# Patient Record
Sex: MALE | Race: WHITE | NOT HISPANIC OR LATINO | Employment: OTHER | URBAN - METROPOLITAN AREA
[De-identification: names, ages, dates, MRNs, and addresses within clinical notes are randomized per-mention and may not be internally consistent; named-entity substitution may affect disease eponyms.]

---

## 2017-02-14 ENCOUNTER — OFFICE VISIT (OUTPATIENT)
Dept: OPTOMETRY | Facility: CLINIC | Age: 82
End: 2017-02-14
Payer: MEDICARE

## 2017-02-14 DIAGNOSIS — H52.03 HYPEROPIA WITH ASTIGMATISM AND PRESBYOPIA, BILATERAL: ICD-10-CM

## 2017-02-14 DIAGNOSIS — H52.4 HYPEROPIA WITH ASTIGMATISM AND PRESBYOPIA, BILATERAL: ICD-10-CM

## 2017-02-14 DIAGNOSIS — H52.203 HYPEROPIA WITH ASTIGMATISM AND PRESBYOPIA, BILATERAL: ICD-10-CM

## 2017-02-14 DIAGNOSIS — H40.019: ICD-10-CM

## 2017-02-14 DIAGNOSIS — H43.813 POSTERIOR VITREOUS DETACHMENT, BILATERAL: ICD-10-CM

## 2017-02-14 DIAGNOSIS — H25.13 NUCLEAR SCLEROSIS, BILATERAL: Primary | ICD-10-CM

## 2017-02-14 PROCEDURE — 92014 COMPRE OPH EXAM EST PT 1/>: CPT | Mod: S$PBB,,, | Performed by: OPTOMETRIST

## 2017-02-14 PROCEDURE — 92015 DETERMINE REFRACTIVE STATE: CPT | Mod: ,,, | Performed by: OPTOMETRIST

## 2017-02-14 PROCEDURE — 99999 PR PBB SHADOW E&M-EST. PATIENT-LVL II: CPT | Mod: PBBFAC,,, | Performed by: OPTOMETRIST

## 2017-02-14 PROCEDURE — 99212 OFFICE O/P EST SF 10 MIN: CPT | Mod: PBBFAC,PO | Performed by: OPTOMETRIST

## 2017-02-14 NOTE — PATIENT INSTRUCTIONS
"Early Cataracts--not visually significant for surgery consultation.    What Are Cataracts?  A clear lens in the eye focuses light. This lets the eye see images sharply. With age, the lens slowly becomes cloudy. The cloudy lens is a cataract. A cataract scatters light and makes it hard for the eye to focus. Cataracts often form in both eyes. But one lens may cloud faster than the other.      The Aging of Your Lens    Your lens may cloud so slowly that you don`t notice any vision changes at first. But as the cataract gets worse, the eye has a harder time focusing. In early stages, glasses may help you see better. As the lens gets cloudier, your doctor may recommend surgery to restore your vision.  FLASHES / FLOATERS / POSTERIOR VITREOUS DETACHMENT    Call the clinic if you have any further changes in symptoms.  Including:  Increased numbers of floaters or flashing lights, dimness or darkness that moves through or stays constant in your vision, or any pain in the eye (s).            DRY EYES:  Use Over The Counter artificial tears as needed for dry eye symptoms.  Some common brands include:  Systane, Optive, and Refresh.  These drops can be used as frequently as desired, but may be most helpful use during long periods of concentrated work.  For example, reading / working at the computer.  Avoid drops that "get redness out", as these contain medication that may further irritate the eyes.    ALLERGY EYES / SYMPTOMS:    Over the counter medications include--Zaditor and Alaway  Use as directed 1-2 drops daily for symptoms of itching / watering eyes.  These drops will not help for dry eye or exposure symptoms.      "

## 2017-02-14 NOTE — MR AVS SNAPSHOT
[facility]  [address] [facility] [address]  [facility] [address]  Phone: [phone]  Fax: [phone]                  Melvin Powers   2017 1:30 PM   Office Visit    Description:  Male : 3/17/1930   Provider:  EDVIN Davis OD   Department:  [facility]           Reason for Visit     Annual Exam     Blurred Vision     Dry Eye           Diagnoses this Visit        Comments    Nuclear sclerosis, bilateral    -  Primary     Posterior vitreous detachment, bilateral         OAG (open angle glaucoma) suspect, low risk, unspecified laterality         Hyperopia with astigmatism and presbyopia, bilateral                To Do List           Goals (5 Years of Data)     None      Follow-Up and Disposition     Return in about 1 year (around 2018) for Annual Eye Exam.      [facility]     [facility] Care [facility] -  Assistance  Registered nurses in the [facility] provide clinical advisement, health education, appointment booking, and other advisory services.  Call for this free service at [phone].             Medications           Message regarding Medications     Verify the changes and/or additions to your medication regime listed below are the same as discussed with your clinician today.  If any of these changes or additions are incorrect, please notify your healthcare provider.             Verify that the below list of medications is an accurate representation of the medications you are currently taking.  If none reported, the list may be blank. If incorrect, please contact your healthcare provider. Carry this list with you in case of emergency.           Current Medications     aspirin 81 MG Chew Take 1 tablet (81 mg total) by mouth once daily.    clopidogrel (PLAVIX) 75 mg tablet Take 1 tablet (75 mg total) by mouth once daily. BRAND NAME ONLY.    clotrimazole-betamethasone (LOTRISONE) lotion Apply topically 2 (two) times daily. Apply twice daily     ERGOCALCIFEROL, VITAMIN D2, (VITAMIN D ORAL)     furosemide (LASIX) 20 MG tablet Take 2 tablets in am, 1 in early pm.    lactulose (KRISTALOSE) 10 gram packet One packet tid    levothyroxine (SYNTHROID) 25 MCG tablet Take 1 tablet (25 mcg total) by mouth before breakfast.    linaclotide (LINZESS) 145 mcg Cap capsule Take 1 capsule (145 mcg total) by mouth once daily.    magnesium oxide (MAG-OX) 400 mg tablet Take one bid    multivitamin (THERAGRAN) per tablet Take 1 tablet by mouth once daily.    rosuvastatin (CRESTOR) 40 MG Tab Take 1 tablet (40 mg total) by mouth once daily.    UBIDECARENONE (CO Q-10 ORAL)     vitamin E 400 unit Tab            Clinical Reference Information           Allergies as of 2/14/2017     Atorvastatin    Codeine    Iodinated Contrast Media - Iv Dye      Immunizations Administered on Date of Encounter - 2/14/2017     None      [facility] Sign-Up     Activating your [facility] account is as easy as 1-2-3!     1) Visit [email or web address], select Sign Up Now, enter this activation code and your date of birth, then select Next.  [email or web address]  Expires: 3/31/2017  2:21 PM      2) Create a username and password to use when you visit [facility] in the future and select a security question in case you lose your password and select Next.    3) Enter your e-mail address and click Sign Up!    Additional Information  If you have questions, please e-mail [email or web address] or call [phone] to talk to our [facility] staff. Remember, [facility] is NOT to be used for urgent needs. For medical emergencies, dial 911.         Instructions    Early Cataracts--not visually significant for surgery consultation.    What Are Cataracts?  A clear lens in the eye focuses light. This lets the eye see images sharply. With age, the lens slowly becomes cloudy. The cloudy lens is a cataract. A cataract scatters light and makes it hard for the eye to focus. Cataracts often form in both eyes. But one lens may cloud faster  "than the other.      The Aging of Your Lens    Your lens may cloud so slowly that you don`t notice any vision changes at first. But as the cataract gets worse, the eye has a harder time focusing. In early stages, glasses may help you see better. As the lens gets cloudier, your doctor may recommend surgery to restore your vision.  FLASHES / FLOATERS / POSTERIOR VITREOUS DETACHMENT    Call the clinic if you have any further changes in symptoms.  Including:  Increased numbers of floaters or flashing lights, dimness or darkness that moves through or stays constant in your vision, or any pain in the eye (s).            DRY EYES:  Use Over The Counter artificial tears as needed for dry eye symptoms.  Some common brands include:  Systane, Optive, and Refresh.  These drops can be used as frequently as desired, but may be most helpful use during long periods of concentrated work.  For example, reading / working at the computer.  Avoid drops that "get redness out", as these contain medication that may further irritate the eyes.    ALLERGY EYES / SYMPTOMS:    Over the counter medications include--Zaditor and Alaway  Use as directed 1-2 drops daily for symptoms of itching / watering eyes.  These drops will not help for dry eye or exposure symptoms.           Language Assistance Services     ATTENTION: Language assistance services are available, free of charge. Please call [phone].      ATENCIÓN: Si mimi abrams, tiene a brice disposición servicios gratuitos de asistencia lingüística. Llame al [phone].     SANDEEP Ý: N?u b?n nói Ti?ng Vi?t, có các d?ch v? h? tr? ngôn ng? mi?n phí dành cho b?n. G?i s? [phone].         [facility] complies with applicable Federal civil rights laws and does not discriminate on the basis of race, color, national origin, age, disability, or sex.        "

## 2017-02-14 NOTE — PROGRESS NOTES
HPI     Annual Exam    Additional comments: DLE 8-14 (bonnie)   ocular health exam            Blurred Vision    Additional comments: at both near & distance            Dry Eye    Additional comments: +Visine gtts prn           Comments   Agree above  Notes [facility] seems reduced at tv         Last edited by EDVIN Davis, OD on 2/14/2017  2:06 PM. (History)            Assessment /Plan     For exam results, see Encounter Report.    Nuclear sclerosis, bilateral    Posterior vitreous detachment, bilateral    OAG (open angle glaucoma) suspect, low risk, unspecified laterality    Hyperopia with astigmatism and presbyopia, bilateral      1. Vis sig, borderline ready for consult  Pt defers for now, cautions driving, avoid night time  2. RD precautions given  3. Mod/ large c/d  Mid teens IOP  Shallow angles 2+  Need CCT  Consider updated baseline fields / oct    4. Updated specs rx, gave copy, fill prn    Discussed and educated patient on current findings /plan.  RTC 1 year, prn if any changes / issues

## 2017-11-16 ENCOUNTER — PATIENT OUTREACH (OUTPATIENT)
Dept: ADMINISTRATIVE | Facility: HOSPITAL | Age: 82
End: 2017-11-16

## 2017-11-16 NOTE — LETTER
November 16, 2017    Melvin Powers  [address]  [facility] [address]             [facility]  [address]  [facility] [address]  Phone: [phone] Dear Mr. Powers:    [facility] is committed to your overall health.  To help you get the most out of each of your visits, we will review your information to make sure you are up to date on all of your recommended tests and/or procedures.      Dr. Mitch Rebolledo has found that your chart shows you may be due for your fasting cholesterol labs and possibly some immunizations (shingles, pneumonia, and flu).     Medicare does not cover all immunizations to be given in the clinic.  Check your benefits to ensure that you do not need to receive your immunizations at the pharmacy.    If you have had any of the above done at another facility, please bring the records or information with you so that your record at [facility] will be complete.  If you would like to schedule any of these, please contact me.    If you are currently taking medication, please bring it with you to your appointment for review.    Also, if you have any type of Advanced Directives, please bring them with you to your office visit so we may scan them into your chart.    If you have any questions or concerns, please don't hesitate to call.    Thank you for letting us care for you,  Shelby De Luna LPN Clinical Care Coordinator  [facility] [place] [facility] [place]  [phone]

## 2017-11-20 RX ORDER — FUROSEMIDE 20 MG/1
TABLET ORAL
Qty: 270 TABLET | Refills: 3 | Status: SHIPPED | OUTPATIENT
Start: 2017-11-20 | End: 2018-07-02

## 2017-11-24 DIAGNOSIS — R42 DIZZY: Primary | ICD-10-CM

## 2017-12-01 ENCOUNTER — LAB VISIT (OUTPATIENT)
Dept: LAB | Facility: HOSPITAL | Age: 82
End: 2017-12-01
Attending: FAMILY MEDICINE
Payer: MEDICARE

## 2017-12-01 ENCOUNTER — OFFICE VISIT (OUTPATIENT)
Dept: FAMILY MEDICINE | Facility: CLINIC | Age: 82
End: 2017-12-01
Payer: MEDICARE

## 2017-12-01 VITALS
BODY MASS INDEX: 26.6 KG/M2 | SYSTOLIC BLOOD PRESSURE: 138 MMHG | HEART RATE: 82 BPM | HEIGHT: 68 IN | DIASTOLIC BLOOD PRESSURE: 70 MMHG | TEMPERATURE: 98 F | WEIGHT: 175.5 LBS

## 2017-12-01 DIAGNOSIS — E03.9 HYPOTHYROIDISM, UNSPECIFIED TYPE: ICD-10-CM

## 2017-12-01 DIAGNOSIS — I10 ESSENTIAL HYPERTENSION: ICD-10-CM

## 2017-12-01 DIAGNOSIS — I25.10 CORONARY ARTERY DISEASE INVOLVING NATIVE CORONARY ARTERY OF NATIVE HEART WITHOUT ANGINA PECTORIS: ICD-10-CM

## 2017-12-01 DIAGNOSIS — E78.00 HYPERCHOLESTEREMIA: ICD-10-CM

## 2017-12-01 DIAGNOSIS — K52.0 RADIATION COLITIS: ICD-10-CM

## 2017-12-01 DIAGNOSIS — R42 VERTIGO: Primary | ICD-10-CM

## 2017-12-01 DIAGNOSIS — K21.9 GASTROESOPHAGEAL REFLUX DISEASE WITHOUT ESOPHAGITIS: ICD-10-CM

## 2017-12-01 DIAGNOSIS — C61 CA OF PROSTATE: ICD-10-CM

## 2017-12-01 DIAGNOSIS — N18.30 CKD (CHRONIC KIDNEY DISEASE) STAGE 3, GFR 30-59 ML/MIN: ICD-10-CM

## 2017-12-01 LAB — TSH SERPL DL<=0.005 MIU/L-ACNC: 2.42 UIU/ML

## 2017-12-01 PROCEDURE — 36415 COLL VENOUS BLD VENIPUNCTURE: CPT | Mod: PN

## 2017-12-01 PROCEDURE — 99999 PR PBB SHADOW E&M-EST. PATIENT-LVL III: CPT | Mod: PBBFAC,,, | Performed by: FAMILY MEDICINE

## 2017-12-01 PROCEDURE — 99215 OFFICE O/P EST HI 40 MIN: CPT | Mod: S$PBB,,, | Performed by: FAMILY MEDICINE

## 2017-12-01 PROCEDURE — 99213 OFFICE O/P EST LOW 20 MIN: CPT | Mod: PBBFAC,PN,25 | Performed by: FAMILY MEDICINE

## 2017-12-01 PROCEDURE — 84443 ASSAY THYROID STIM HORMONE: CPT

## 2017-12-01 PROCEDURE — G0008 ADMIN INFLUENZA VIRUS VAC: HCPCS | Mod: PBBFAC,PN

## 2017-12-01 NOTE — PROGRESS NOTES
Subjective:     THIS DOCUMENT WAS MADE IN PART WITH [employer] SOFTWARE.  OCCASIONALLY THIS SOFTWARE WILL MISINTERPRET WORDS OR PHRASES.     Patient ID: Melvin Powers is a 87 y.o. male.    Chief Complaint: Hospital Follow Up (pt following up from ST for vertigo, states he is still feeling light-headed)    HPI    patient new to me. He is here to establish care and take over management of his chronic conditions.     Note that he was recently in the ER with vertigo. Patient was treated with Mac cuisine which has helped. Symptoms are better but not resolved. He reports one previous episode of vertigo many years ago. But has not had chronic reoccurring symptoms. He has chronic hearing loss with no sudden changes. Note that he was already scheduled to see ENT for this particular problem. He was scheduled to see me on this day well in advance for management of his chronic problems that is what we are going to focus on today.     Hypertension, this is a chronic condition that appears stable and satisfactory. History does not suggest orthostatic hypotension and no changes of such on exam today. He has history of hyperlipidemia and coronary artery disease. He is on high-dose statin with Crestor 40 mg as managed by his cardiologist. He denies any chest pain tachycardia or palpitations.    Hypothyroidism, chronic condition, no recent TSH and this was not checked in emergency room     chronic kidney disease stage III, not significantly out of proportion to age. He is on a diuretic managed by his cardiologist.     He has a history of gastroesophageal reflux disease and irritable bowel syndrome with constipation. This is managed by GI.   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 He said intermittent G.I. Bleeding but not having any currently.    Active Ambulatory Problems     Diagnosis Date Noted    CAD (coronary artery disease) 07/19/2012     Hypertension 07/19/2012    Hypercholesteremia 07/19/2012    GERD (gastroesophageal reflux disease) 07/19/2012    Sleep disorder 07/19/2012    Rectal bleeding 08/15/2012    DDD (degenerative disc disease), lumbar 09/26/2012    Edema 09/27/2013    CKD (chronic kidney disease) stage 3, GFR 30-59 ml/min 03/16/2014    CA of prostate 11/12/2014    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    Irritable bowel syndrome with constipation 11/30/2016     Resolved Ambulatory Problems     Diagnosis Date Noted    No Resolved Ambulatory Problems     Past Medical History:   Diagnosis Date    Cataract     CKD (chronic kidney disease) stage 3, GFR 30-59 ml/min 3/16/2014    Coronary artery disease     Elevated PSA     GERD (gastroesophageal reflux disease)     Hypertension 7/19/2012    Irritable bowel syndrome with constipation 11/30/2016    Thyroid disease     Ulcer      Current Outpatient Prescriptions on File Prior to Visit   Medication Sig Dispense Refill    aspirin 325 MG tablet Take 325 mg by mouth once daily.      coenzyme Q10 (COQ-10) 100 mg capsule Take 100 mg by mouth 2 (two) times daily.      ERGOCALCIFEROL, VITAMIN D2, (VITAMIN D ORAL)       furosemide (LASIX) 20 MG tablet Take 40 mg by mouth once daily.      furosemide (LASIX) 20 MG tablet TAKE 2 TABLETS EVERY       MORNING AND 1 TABLET EARLY EVENING 270 tablet 3    lactulose (KRISTALOSE) 10 gram packet One packet tid 270 each 3    levothyroxine (SYNTHROID) 75 MCG tablet Take 1 tablet (75 mcg total) by mouth once daily. 90 tablet 3    linaclotide (LINZESS) 145 mcg Cap capsule Take 1 capsule (145 mcg total) by mouth once daily. 90 capsule 3    magnesium gluconate 27.5 mg (500 mg) Tab Take 500 mg by mouth 2 (two) times daily.      meclizine (ANTIVERT) 25 mg tablet Take 1 tablet (25 mg total) by mouth 3 (three) times daily as needed. 20 tablet 0    multivitamin (THERAGRAN) per tablet Take 1 tablet by mouth once daily. 100 tablet 3    rosuvastatin  "(CRESTOR) 40 MG Tab Take 1 tablet (40 mg total) by mouth once daily. 90 tablet 3    vitamin E 400 unit Tab       ondansetron (ZOFRAN-ODT) 8 MG TbDL Take 1 tablet (8 mg total) by mouth every 8 (eight) hours as needed. 15 tablet 0     No current facility-administered medications on file prior to visit.      Review of patient's allergies indicates:   Allergen Reactions    Atorvastatin      Other reaction(s): Muscle pain    Codeine      Other reaction(s): makes him"goofey"    Iodinated contrast- oral and iv dye      Other reaction(s): Rash     Social History   Substance Use Topics    Smoking status: Never Smoker    Smokeless tobacco: Never Used    Alcohol use No     Family History   Problem Relation Age of Onset    Rheum arthritis Mother     Cancer Father     Rheum arthritis Father     Cancer Sister      bone    Cancer Brother            Review of Systems   Constitutional: Positive for activity change and fatigue. Negative for appetite change, chills, fever and unexpected weight change.   HENT: Negative for congestion, sinus pressure, trouble swallowing and voice change.    Eyes: Negative for pain and visual disturbance.   Respiratory: Negative for cough, chest tightness and shortness of breath.    Cardiovascular: Negative for chest pain, palpitations and leg swelling.   Gastrointestinal: Positive for constipation and diarrhea. Negative for abdominal pain, blood in stool, nausea and vomiting.   Genitourinary: Negative for dysuria, frequency and hematuria.   Musculoskeletal: Positive for arthralgias and back pain. Negative for neck pain.   Skin: Negative for rash and wound.   Neurological: Positive for dizziness and weakness. Negative for syncope and light-headedness.   Psychiatric/Behavioral: Negative.        Objective:       Vitals:    12/01/17 1337   BP: 138/70   BP Location: Left arm   Patient Position: Sitting   BP Method: Medium (Manual)   Pulse: 82   Temp: 98.1 °F (36.7 °C)   TempSrc: Oral   Weight: " "79.6 kg (175 lb 7.8 oz)   Height: 5' 8" (1.727 m)       Physical Exam   Constitutional: He is oriented to person, place, and time. He appears well-developed and well-nourished. No distress.   HENT:   Head: Normocephalic and atraumatic.   Right Ear: Tympanic membrane, external ear and ear canal normal.   Left Ear: Tympanic membrane, external ear and ear canal normal.   Nose: Nose normal.   Mouth/Throat: Oropharynx is clear and moist. No oropharyngeal exudate.   Cardiovascular: Normal rate, regular rhythm, normal heart sounds and intact distal pulses.    No murmur heard.  Pulmonary/Chest: Effort normal and breath sounds normal. No respiratory distress. He has no wheezes. He has no rales.   Neurological: He is alert and oriented to person, place, and time. He displays no atrophy and no tremor. No cranial nerve deficit. He exhibits normal muscle tone. Coordination normal.   Reflex Scores:       Tricep reflexes are 2+ on the right side and 2+ on the left side.       Bicep reflexes are 2+ on the right side and 2+ on the left side.       Brachioradialis reflexes are 2+ on the right side and 2+ on the left side.       Patellar reflexes are 2+ on the right side and 2+ on the left side.  Normal rapid alt mov  Normal finger to nose  Romberg with mild swaying   Skin: He is not diaphoretic.   Psychiatric: He has a normal mood and affect.   Nursing note and vitals reviewed.      Assessment:       1. Vertigo    2. Essential hypertension    3. Hypercholesteremia    4. CKD (chronic kidney disease) stage 3, GFR 30-59 ml/min    5. Coronary artery disease involving native coronary artery of native heart without angina pectoris    6. Gastroesophageal reflux disease without esophagitis    7. Hypothyroidism, unspecified type        Plan:       Melvin was seen today for hospital follow up.    Diagnoses and all orders for this visit:    Vertigo  -     Radiology US Carotid Bilateral; Future  Exam c/w with BPPV, see ent as " scheduled    Essential hypertension  Stable, continue current medications.    Hypercholesteremia  Defer to Dr. Valencia    CKD (chronic kidney disease) stage 3, GFR 30-59 ml/min  -     Radiology US Carotid Bilateral; Future   No nonsteroidal anti-inflammatory drugs. He is on a diuretic. Discuss adequate fluid replacement, monitoring daily weights    Coronary artery disease involving native coronary artery of native heart without angina pectoris  -     Radiology US Carotid Bilateral; Future   no angina, but with dizziness, at risk for cervical vascular disease.    Gastroesophageal reflux disease without esophagitis   stable    Hypothyroidism, unspecified type  -     TSH; Future

## 2017-12-04 ENCOUNTER — TELEPHONE (OUTPATIENT)
Dept: FAMILY MEDICINE | Facility: CLINIC | Age: 82
End: 2017-12-04

## 2017-12-04 NOTE — TELEPHONE ENCOUNTER
Spoke with pt and rescheduled ultrasound for Thursday 12/7 due to lack of transportation.  Pt expressed verbal understanding.

## 2017-12-04 NOTE — TELEPHONE ENCOUNTER
----- Message from Jessie Driscoll sent at 12/4/2017  9:59 AM CST -----  Contact: self  Patient states he needs to beasley his ultrasound to tomorrow instead of 12/6  He wont have a ride Wednesday   Please call back [phone]

## 2017-12-05 ENCOUNTER — HOSPITAL ENCOUNTER (OUTPATIENT)
Dept: RADIOLOGY | Facility: HOSPITAL | Age: 82
Discharge: HOME | End: 2017-12-05
Attending: FAMILY MEDICINE
Payer: MEDICARE

## 2017-12-05 ENCOUNTER — OFFICE VISIT (OUTPATIENT)
Dept: OTOLARYNGOLOGY | Facility: CLINIC | Age: 82
End: 2017-12-05
Payer: MEDICARE

## 2017-12-05 VITALS
HEIGHT: 68 IN | SYSTOLIC BLOOD PRESSURE: 146 MMHG | DIASTOLIC BLOOD PRESSURE: 58 MMHG | WEIGHT: 171.75 LBS | BODY MASS INDEX: 26.03 KG/M2

## 2017-12-05 DIAGNOSIS — I25.10 CORONARY ARTERY DISEASE INVOLVING NATIVE CORONARY ARTERY OF NATIVE HEART WITHOUT ANGINA PECTORIS: ICD-10-CM

## 2017-12-05 DIAGNOSIS — N18.30 CKD (CHRONIC KIDNEY DISEASE) STAGE 3, GFR 30-59 ML/MIN: ICD-10-CM

## 2017-12-05 DIAGNOSIS — R42 DIZZINESS AND GIDDINESS: ICD-10-CM

## 2017-12-05 DIAGNOSIS — R42 VERTIGO: ICD-10-CM

## 2017-12-05 PROCEDURE — 99213 OFFICE O/P EST LOW 20 MIN: CPT | Mod: PBBFAC,25,PO | Performed by: OTOLARYNGOLOGY

## 2017-12-05 PROCEDURE — 93880 EXTRACRANIAL BILAT STUDY: CPT | Mod: 26,,, | Performed by: RADIOLOGY

## 2017-12-05 PROCEDURE — 99999 PR PBB SHADOW E&M-EST. PATIENT-LVL III: CPT | Mod: PBBFAC,,, | Performed by: OTOLARYNGOLOGY

## 2017-12-05 PROCEDURE — 93880 EXTRACRANIAL BILAT STUDY: CPT | Mod: TC,PO

## 2017-12-05 PROCEDURE — 99203 OFFICE O/P NEW LOW 30 MIN: CPT | Mod: S$PBB,,, | Performed by: OTOLARYNGOLOGY

## 2017-12-05 NOTE — PROGRESS NOTES
Subjective:       Patient ID: Melvin Powers is a 87 y.o. male.    Chief Complaint: Dizziness    Melvin is here for vertigo. Symptoms have been present for 3 weeks. He describes a significant vertigo episode requiring ER stay. He felt a little dizzy the day prior but attributed it to diet at the time. Occurred spontaneously. No hearing loss, aural fullness, or tinnitus. It does not fluctuate. He describes a lightheadedness now (no longer vertigo) but still with dysequilibrium. Quick movements will bring it on. No precedent URI.     Pertinent meds:  Previous surgery: none  History of vertigo in 2009 treated with Antivert that went away after a few days.     History   Smoking Status    Never Smoker   Smokeless Tobacco    Never Used     History   Alcohol Use No          Review of Systems   Constitutional: Negative for activity change and appetite change.   Eyes: Negative for discharge.   Respiratory: Negative for difficulty breathing and wheezing   Cardiovascular: Negative for chest pain.   Gastrointestinal: Negative for abdominal distention and abdominal pain.   Endocrine: Negative for cold intolerance and heat intolerance.   Genitourinary: Negative for dysuria.   Musculoskeletal: Negative for gait problem and joint swelling.   Skin: Negative for color change and pallor.   Neurological: Negative for syncope and weakness.   Psychiatric/Behavioral: Negative for agitation and confusion.     Objective:        Constitutional:   He is oriented to person, place, and time. He appears well-developed and well-nourished. He appears alert. He is active. Normal speech.      Head:  Normocephalic and atraumatic. Head is without TMJ tenderness. No scars. Salivary glands normal.  Facial strength is normal.      Ears:    Right Ear: No drainage or swelling. No middle ear effusion.   Left Ear: No drainage or swelling.  No middle ear effusion.   Right [place]-Hallpike - no vertigo, no rotary nystagmus   Left [place]-Hallpike - no vertigo, no  rotary nystagmus       Nose:  No mucosal edema, rhinorrhea or sinus tenderness. No turbinate hypertrophy.      Mouth/Throat  Oropharynx clear and moist without lesions or asymmetry, normal uvula midline and mirror exam normal. Normal dentition. No uvula swelling, lacerations or trismus. No oropharyngeal exudate. Tonsillar erythema, tonsillar exudate.      Neck:  Full range of motion with neck supple and no adenopathy. Thyroid tenderness is present. No tracheal deviation, no edema, no erythema, normal range of motion, no stridor, no crepitus and no neck rigidity present. No thyroid mass present.     Cardiovascular:   Intact distal pulses and normal pulses.      Pulmonary/Chest:   Effort normal and breath sounds normal. No stridor.     Psychiatric:   His speech is normal and behavior is normal. His mood appears not anxious. His affect is not labile.     Neurological:   He is alert and oriented to person, place, and time. No sensory deficit. He displays a negative Romberg sign. Coordination and gait (normal ambulation) normal.     Skin:   No abrasions, lacerations, lesions, or rashes. No abrasion and no bruising noted.         Tests / Results:  TSH 2.4 (n)  Trop normal    Carotid US pending    Assessment:       1. Dizziness and giddiness          Plan:         I suspect his vertigo was due to a vestibular neuronitis. He is improving. Recommend weaning Meclizine and fall precautions. I agree with Ultrasound of carotids; low suspicion for cardiogenic at this point, but reasonable for him.   Follow-up with me if symptoms worsen.

## 2018-03-17 PROBLEM — I21.4 NSTEMI (NON-ST ELEVATED MYOCARDIAL INFARCTION): Status: ACTIVE | Noted: 2018-03-17

## 2018-03-19 PROBLEM — E03.9 HYPOTHYROIDISM: Status: ACTIVE | Noted: 2018-03-19

## 2018-03-28 ENCOUNTER — TELEPHONE (OUTPATIENT)
Dept: FAMILY MEDICINE | Facility: CLINIC | Age: 83
End: 2018-03-28

## 2018-03-28 NOTE — TELEPHONE ENCOUNTER
----- Message from Cyndi Woods sent at 3/28/2018  3:50 PM CDT -----  Call Miss López / was released from [facility] 03/21 with orders for a 1 week follow up appt / requesting next week / please call [phone] (next available is in may)

## 2018-03-28 NOTE — TELEPHONE ENCOUNTER
I'm certain we can find an appointment next week. I'm not so sure about tomorrow.     If he was in for an acute cardiac event I'm not certain why it's critical for him to see me in one week certainly he should be seeing his cardiologist. But I'm happy to help if needed.     If there's something actually critical then we can work him in tomorrow.      Also if he was instructed  to be seen within one week of discharge why are they calling one week later, why didn't somebody contact us a week ago upon discharge?

## 2018-03-28 NOTE — TELEPHONE ENCOUNTER
Pt is requesting to see provider for a hospital follow up from ST. Please review schd and advise. Pt needs to be seen in one week per discharge summary. 1st opening is in may 2018 on the 5th.

## 2018-03-29 NOTE — TELEPHONE ENCOUNTER
----- Message from Dinora Guillory sent at 3/29/2018 11:54 AM CDT -----  Contact: wife, Berna  Patient requesting call back regarding making a post hospital follow up.  Please call [phone]

## 2018-03-29 NOTE — TELEPHONE ENCOUNTER
Per Dr. Rebolledo:    [facility] next week.     Tried to reach pt. No answer, left msg to call back.    Contacting to [facility] appt.

## 2018-03-29 NOTE — TELEPHONE ENCOUNTER
Tried to reach pt. No answer, left msg to call back.    [facility]

## 2018-04-03 ENCOUNTER — OFFICE VISIT (OUTPATIENT)
Dept: FAMILY MEDICINE | Facility: CLINIC | Age: 83
End: 2018-04-03
Payer: MEDICARE

## 2018-04-03 VITALS
TEMPERATURE: 98 F | HEART RATE: 88 BPM | BODY MASS INDEX: 26.01 KG/M2 | HEIGHT: 68 IN | SYSTOLIC BLOOD PRESSURE: 114 MMHG | DIASTOLIC BLOOD PRESSURE: 70 MMHG | WEIGHT: 171.63 LBS

## 2018-04-03 DIAGNOSIS — E03.9 ACQUIRED HYPOTHYROIDISM: ICD-10-CM

## 2018-04-03 DIAGNOSIS — I25.10 CORONARY ARTERY DISEASE DUE TO LIPID RICH PLAQUE: Chronic | ICD-10-CM

## 2018-04-03 DIAGNOSIS — I21.4 NSTEMI (NON-ST ELEVATED MYOCARDIAL INFARCTION): ICD-10-CM

## 2018-04-03 DIAGNOSIS — B96.89 BACTERIAL PHARYNGITIS: Primary | ICD-10-CM

## 2018-04-03 DIAGNOSIS — N18.30 CKD (CHRONIC KIDNEY DISEASE) STAGE 3, GFR 30-59 ML/MIN: Chronic | ICD-10-CM

## 2018-04-03 DIAGNOSIS — I10 ESSENTIAL HYPERTENSION: ICD-10-CM

## 2018-04-03 DIAGNOSIS — I25.83 CORONARY ARTERY DISEASE DUE TO LIPID RICH PLAQUE: Chronic | ICD-10-CM

## 2018-04-03 DIAGNOSIS — J02.8 BACTERIAL PHARYNGITIS: Primary | ICD-10-CM

## 2018-04-03 DIAGNOSIS — J32.9 SINUSITIS, UNSPECIFIED CHRONICITY, UNSPECIFIED LOCATION: ICD-10-CM

## 2018-04-03 PROCEDURE — 99214 OFFICE O/P EST MOD 30 MIN: CPT | Mod: S$PBB,,, | Performed by: FAMILY MEDICINE

## 2018-04-03 PROCEDURE — 99213 OFFICE O/P EST LOW 20 MIN: CPT | Mod: PBBFAC,PN | Performed by: FAMILY MEDICINE

## 2018-04-03 PROCEDURE — 99999 PR PBB SHADOW E&M-EST. PATIENT-LVL III: CPT | Mod: PBBFAC,,, | Performed by: FAMILY MEDICINE

## 2018-04-03 RX ORDER — AMOXICILLIN 875 MG/1
875 TABLET, FILM COATED ORAL 2 TIMES DAILY
Qty: 20 TABLET | Refills: 0 | Status: SHIPPED | OUTPATIENT
Start: 2018-04-03 | End: 2018-04-13

## 2018-04-03 RX ORDER — BENZONATATE 100 MG/1
CAPSULE ORAL
Qty: 30 CAPSULE | Refills: 2 | Status: SHIPPED | OUTPATIENT
Start: 2018-04-03 | End: 2018-10-16

## 2018-04-03 NOTE — PROGRESS NOTES
Subjective:     THIS DOCUMENT WAS MADE IN PART WITH [employer] DICTATION SOFTWARE. OCCASIONALLY THIS SOFTWARE MAY MISINTERPRET WORDS OR PHRASES.     Patient ID: Melvin Powers is a 88 y.o. male.    Chief Complaint: Follow-up (pt states he had heart attack and stent placed.  )    HPI      Hospital follow-up. This is an 88-year-old male who I recently met in December after he transitioned primary care physicians. He has a history of hypertension, age-appropriate chronic kidney disease, and hypothyroidism which I do follow and manage these have been stable. But he presented to the emergency room recently with chest pains that apparently developed after taking sildenafil   was diagnosed with a non-ST elevated MI,  Had multiple stents placed. He is doing well and has followed with his cardiologist after discharge.      His actual chief complaint is ST x several weeks, began prior to his recent cardiac event  Worse night, dry, mouth breather , doesn't seem to complain of much congestion  But still congestion, green and yellow drainage.  No fever  Cough, worse in evening  Currently not taking anything, states he was told to take Robitussin but could not find plain Robitussin at multiple pharmacies.      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    Irritable bowel syndrome with constipation 11/30/2016    Dizziness and giddiness 12/05/2017    NSTEMI (non-ST elevated myocardial infarction) 03/17/2018    Acquired hypothyroidism 03/19/2018     Resolved Ambulatory Problems      Diagnosis Date Noted    No Resolved Ambulatory Problems     Past Medical History:   Diagnosis Date    Cataract     CKD (chronic kidney disease) stage 3, GFR 30-59 ml/min 3/16/2014    Coronary artery disease     Elevated PSA     GERD (gastroesophageal reflux disease)     Hypertension 7/19/2012    Hypothyroidism 3/19/2018    Irritable bowel syndrome with constipation 11/30/2016    Thyroid disease     Ulcer    '    Review of Systems   Constitutional: Positive for activity change and fatigue. Negative for appetite change, chills, fever and unexpected weight change.   HENT: Negative for congestion, sinus pressure, trouble swallowing and voice change.    Eyes: Negative for pain and visual disturbance.   Respiratory: Negative for cough, chest tightness and shortness of breath.    Cardiovascular: Negative for chest pain, palpitations and leg swelling.   Gastrointestinal: Positive for constipation (states now controlled with prunes and prune juice, stopped Linzess). Negative for abdominal pain, blood in stool, diarrhea, nausea and vomiting.   Genitourinary: Negative for dysuria, frequency and hematuria.   Musculoskeletal: Positive for arthralgias and back pain. Negative for neck pain.   Skin: Negative for rash and wound.   Neurological: Positive for dizziness and weakness. Negative for syncope and light-headedness.   Psychiatric/Behavioral: Negative.        Objective:      Physical Exam   Constitutional: He is oriented to person, place, and time. He appears well-developed and well-nourished. No distress.   HENT:   Head: Normocephalic and atraumatic.   Right Ear: Tympanic membrane, external ear and ear canal normal.   Left Ear: Tympanic membrane, external ear and ear canal normal.   Nose: Mucosal edema present. Right sinus exhibits no maxillary sinus tenderness and no frontal sinus tenderness. Left sinus exhibits no maxillary sinus tenderness and no frontal sinus tenderness.   Mouth/Throat: Uvula is midline and  "mucous membranes are normal. Posterior oropharyngeal erythema present. No posterior oropharyngeal edema or tonsillar abscesses. Oropharyngeal exudate: postnasal drainage.   Eyes: Conjunctivae are normal. Pupils are equal, round, and reactive to light. Right eye exhibits no discharge. Left eye exhibits no discharge. No scleral icterus.   Neck: Normal range of motion. Neck supple. No tracheal deviation present. No thyromegaly present.   Cardiovascular: Normal rate, regular rhythm and normal heart sounds.    No murmur heard.  Pulmonary/Chest: Effort normal and breath sounds normal. No respiratory distress. He has no wheezes. He has no rales.   Lymphadenopathy:     He has no cervical adenopathy.   Neurological: He is alert and oriented to person, place, and time.   Skin: Skin is warm and dry. No rash noted. He is not diaphoretic.   Psychiatric: He has a normal mood and affect. His behavior is normal.   Vitals reviewed.      Vitals:    04/03/18 1313 04/03/18 1354   BP: (!) 144/76 114/70   BP Location: Right arm    Patient Position: Sitting    BP Method: Medium (Manual)    Pulse: 88    Temp: 98.1 °F (36.7 °C)    TempSrc: Oral    Weight: 77.9 kg (171 lb 10.1 oz)    Height: 5' 8" (1.727 m)      Results for orders placed or performed during the hospital encounter of 03/17/18   CBC auto differential   Result Value Ref Range    WBC 10.39 3.90 - 12.70 K/uL    RBC 4.86 4.60 - 6.20 M/uL    Hemoglobin 15.7 14.0 - 18.0 g/dL    Hematocrit 44.3 40.0 - 54.0 %    MCV 91 82 - 98 fL    MCH 32.3 (H) 27.0 - 31.0 pg    MCHC 35.4 32.0 - 36.0 g/dL    RDW 13.3 11.5 - 14.5 %    Platelets 187 150 - 350 K/uL    MPV 9.3 9.2 - 12.9 fL    Gran # (ANC) 8.7 (H) 1.8 - 7.7 K/uL    Lymph # 0.9 (L) 1.0 - 4.8 K/uL    Mono # 0.7 0.3 - 1.0 K/uL    Eos # 0.1 0.0 - 0.5 K/uL    Baso # 0.02 0.00 - 0.20 K/uL    nRBC 0 0 /100 WBC    Gran% 84.0 (H) 38.0 - 73.0 %    Lymph% 8.6 (L) 18.0 - 48.0 %    Mono% 6.7 4.0 - 15.0 %    Eosinophil% 0.5 0.0 - 8.0 %    Basophil% " 0.2 0.0 - 1.9 %    Differential Method Automated    Comprehensive metabolic panel   Result Value Ref Range    Sodium 139 136 - 145 mmol/L    Potassium 4.1 3.5 - 5.1 mmol/L    Chloride 99 95 - 110 mmol/L    CO2 27 22 - 31 mmol/L    Glucose 121 (H) 70 - 110 mg/dL    BUN, Bld 19 9 - 21 mg/dL    Creatinine 1.12 0.50 - 1.40 mg/dL    Calcium 9.8 8.4 - 10.2 mg/dL    Total Protein 7.5 6.0 - 8.4 g/dL    Albumin 4.4 3.5 - 5.2 g/dL    Total Bilirubin 0.5 0.2 - 1.3 mg/dL    Alkaline Phosphatase 75 38 - 145 U/L    AST 40 17 - 59 U/L    ALT 43 10 - 44 U/L    Anion Gap 13 8 - 16 mmol/L    eGFR if African American >60 >60 mL/min/1.73 m^2    eGFR if non African American 58 (A) >60 mL/min/1.73 m^2   Troponin I   Result Value Ref Range    Troponin I 0.344 (HH) 0.012 - 0.034 ng/mL   Lipase   Result Value Ref Range    Lipase 52 23 - 300 U/L   Troponin I   Result Value Ref Range    Troponin I 8.850 (HH) 0.012 - 0.034 ng/mL   CBC auto differential   Result Value Ref Range    WBC 8.42 3.90 - 12.70 K/uL    RBC 4.79 4.60 - 6.20 M/uL    Hemoglobin 15.3 14.0 - 18.0 g/dL    Hematocrit 43.6 40.0 - 54.0 %    MCV 91 82 - 98 fL    MCH 31.9 (H) 27.0 - 31.0 pg    MCHC 35.1 32.0 - 36.0 g/dL    RDW 13.4 11.5 - 14.5 %    Platelets 193 150 - 350 K/uL    MPV 9.5 9.2 - 12.9 fL    Gran # (ANC) 5.9 1.8 - 7.7 K/uL    Lymph # 1.6 1.0 - 4.8 K/uL    Mono # 0.8 0.3 - 1.0 K/uL    Eos # 0.1 0.0 - 0.5 K/uL    Baso # 0.02 0.00 - 0.20 K/uL    nRBC 0 0 /100 WBC    Gran% 69.7 38.0 - 73.0 %    Lymph% 18.8 18.0 - 48.0 %    Mono% 10.0 4.0 - 15.0 %    Eosinophil% 1.3 0.0 - 8.0 %    Basophil% 0.2 0.0 - 1.9 %    Differential Method Automated    Basic metabolic panel   Result Value Ref Range    Sodium 137 136 - 145 mmol/L    Potassium 4.1 3.5 - 5.1 mmol/L    Chloride 100 95 - 110 mmol/L    CO2 27 22 - 31 mmol/L    Glucose 97 70 - 110 mg/dL    BUN, Bld 17 9 - 21 mg/dL    Creatinine 0.97 0.50 - 1.40 mg/dL    Calcium 9.1 8.4 - 10.2 mg/dL    Anion Gap 10 8 - 16 mmol/L    eGFR if  African American >60 >60 mL/min/1.73 m^2    eGFR if non African American >60 >60 mL/min/1.73 m^2   Magnesium   Result Value Ref Range    Magnesium 2.1 1.6 - 2.6 mg/dL   Phosphorus   Result Value Ref Range    Phosphorus 2.8 2.7 - 4.5 mg/dL   Lipid panel   Result Value Ref Range    Cholesterol 148 120 - 199 mg/dL    Triglycerides 109 30 - 150 mg/dL    HDL 40 40 - 75 mg/dL    LDL Cholesterol 86.2 63.0 - 159.0 mg/dL    HDL/Chol Ratio 27.0 20.0 - 50.0 %    Total Cholesterol/HDL Ratio 3.7 2.0 - 5.0    Non-HDL Cholesterol 108 mg/dL   Troponin I   Result Value Ref Range    Troponin I 7.780 (HH) 0.012 - 0.034 ng/mL   Troponin I   Result Value Ref Range    Troponin I 4.370 (HH) 0.012 - 0.034 ng/mL   CBC auto differential   Result Value Ref Range    WBC 6.97 3.90 - 12.70 K/uL    RBC 4.95 4.60 - 6.20 M/uL    Hemoglobin 15.7 14.0 - 18.0 g/dL    Hematocrit 45.2 40.0 - 54.0 %    MCV 91 82 - 98 fL    MCH 31.7 (H) 27.0 - 31.0 pg    MCHC 34.7 32.0 - 36.0 g/dL    RDW 13.6 11.5 - 14.5 %    Platelets 192 150 - 350 K/uL    MPV 9.5 9.2 - 12.9 fL    Gran # (ANC) 4.4 1.8 - 7.7 K/uL    Lymph # 1.6 1.0 - 4.8 K/uL    Mono # 0.8 0.3 - 1.0 K/uL    Eos # 0.2 0.0 - 0.5 K/uL    Baso # 0.03 0.00 - 0.20 K/uL    nRBC 0 0 /100 WBC    Gran% 62.6 38.0 - 73.0 %    Lymph% 22.5 18.0 - 48.0 %    Mono% 11.2 4.0 - 15.0 %    Eosinophil% 3.3 0.0 - 8.0 %    Basophil% 0.4 0.0 - 1.9 %    Differential Method Automated    Basic metabolic panel   Result Value Ref Range    Sodium 140 136 - 145 mmol/L    Potassium 3.9 3.5 - 5.1 mmol/L    Chloride 100 95 - 110 mmol/L    CO2 30 22 - 31 mmol/L    Glucose 97 70 - 110 mg/dL    BUN, Bld 18 9 - 21 mg/dL    Creatinine 1.12 0.50 - 1.40 mg/dL    Calcium 9.3 8.4 - 10.2 mg/dL    Anion Gap 10 8 - 16 mmol/L    eGFR if African American >60 >60 mL/min/1.73 m^2    eGFR if non African American 58 (A) >60 mL/min/1.73 m^2   Magnesium   Result Value Ref Range    Magnesium 2.0 1.6 - 2.6 mg/dL   Phosphorus   Result Value Ref Range     Phosphorus 2.9 2.7 - 4.5 mg/dL   CBC auto differential   Result Value Ref Range    WBC 6.46 3.90 - 12.70 K/uL    RBC 4.59 (L) 4.60 - 6.20 M/uL    Hemoglobin 14.5 14.0 - 18.0 g/dL    Hematocrit 41.9 40.0 - 54.0 %    MCV 91 82 - 98 fL    MCH 31.6 (H) 27.0 - 31.0 pg    MCHC 34.6 32.0 - 36.0 g/dL    RDW 13.5 11.5 - 14.5 %    Platelets 179 150 - 350 K/uL    MPV 9.5 9.2 - 12.9 fL    Gran # (ANC) 4.1 1.8 - 7.7 K/uL    Lymph # 1.4 1.0 - 4.8 K/uL    Mono # 0.8 0.3 - 1.0 K/uL    Eos # 0.2 0.0 - 0.5 K/uL    Baso # 0.03 0.00 - 0.20 K/uL    nRBC 0 0 /100 WBC    Gran% 63.0 38.0 - 73.0 %    Lymph% 22.0 18.0 - 48.0 %    Mono% 11.6 4.0 - 15.0 %    Eosinophil% 2.9 0.0 - 8.0 %    Basophil% 0.5 0.0 - 1.9 %    Differential Method Automated    Basic metabolic panel   Result Value Ref Range    Sodium 135 (L) 136 - 145 mmol/L    Potassium 3.8 3.5 - 5.1 mmol/L    Chloride 102 95 - 110 mmol/L    CO2 23 22 - 31 mmol/L    Glucose 97 70 - 110 mg/dL    BUN, Bld 19 9 - 21 mg/dL    Creatinine 0.95 0.50 - 1.40 mg/dL    Calcium 8.7 8.4 - 10.2 mg/dL    Anion Gap 10 8 - 16 mmol/L    eGFR if African American >60 >60 mL/min/1.73 m^2    eGFR if non African American >60 >60 mL/min/1.73 m^2   Magnesium   Result Value Ref Range    Magnesium 2.0 1.6 - 2.6 mg/dL   Phosphorus   Result Value Ref Range    Phosphorus 3.5 2.7 - 4.5 mg/dL   2D echo with color flow doppler   Result Value Ref Range    EF 60 55 - 65    Diastolic Dysfunction Yes (A)     Est. PA Systolic Pressure 24.9        Assessment:       1. Bacterial pharyngitis    2. Sinusitis, unspecified chronicity, unspecified location    3. Essential hypertension    4. CKD (chronic kidney disease) stage 3, GFR 30-59 ml/min    5. Acquired hypothyroidism    6. NSTEMI (non-ST elevated myocardial infarction)    7. Coronary artery disease due to lipid rich plaque        Plan:       Melvin was seen today for follow-up.    Diagnoses and all orders for this visit:    Bacterial pharyngitis  -     amoxicillin (AMOXIL)  875 MG tablet; Take 1 tablet (875 mg total) by mouth 2 (two) times daily.    Sinusitis, unspecified chronicity, unspecified location  -     amoxicillin (AMOXIL) 875 MG tablet; Take 1 tablet (875 mg total) by mouth 2 (two) times daily.    Essential hypertension  Stable     CKD (chronic kidney disease) stage 3, GFR 30-59 ml/min  He was concerned about this diagnosis, states he was given to him in the hospital.  His actual average estimated GFR is above 60, there were a few isolated readings in the 50s.  It awakens was reassured that his kidney function is age-appropriate still should take precautions with certain medications and should avoid NSAIDs.     Acquired hypothyroidism  Stable    NSTEMI (non-ST elevated myocardial infarction)  Coronary artery disease due to lipid rich plaque  He is doing well, he will continue to follow with cardiology.      Other orders  -     benzonatate (TESSALON) 100 MG capsule; Take 1-2 caps tid prn cough    He'll follow with me in 2-3 months

## 2018-06-06 ENCOUNTER — LAB VISIT (OUTPATIENT)
Dept: LAB | Facility: HOSPITAL | Age: 83
End: 2018-06-06
Attending: INTERNAL MEDICINE
Payer: MEDICARE

## 2018-06-06 DIAGNOSIS — I25.84 CORONARY ARTERY DISEASE DUE TO CALCIFIED CORONARY LESION: ICD-10-CM

## 2018-06-06 DIAGNOSIS — N18.30 CKD (CHRONIC KIDNEY DISEASE) STAGE 3, GFR 30-59 ML/MIN: Chronic | ICD-10-CM

## 2018-06-06 DIAGNOSIS — I10 ESSENTIAL HYPERTENSION: ICD-10-CM

## 2018-06-06 DIAGNOSIS — M51.36 DDD (DEGENERATIVE DISC DISEASE), LUMBAR: ICD-10-CM

## 2018-06-06 DIAGNOSIS — I25.10 CORONARY ARTERY DISEASE DUE TO CALCIFIED CORONARY LESION: ICD-10-CM

## 2018-06-06 DIAGNOSIS — E78.00 HYPERCHOLESTEREMIA: Chronic | ICD-10-CM

## 2018-06-06 DIAGNOSIS — E03.9 ACQUIRED HYPOTHYROIDISM: ICD-10-CM

## 2018-06-06 LAB
ALBUMIN SERPL BCP-MCNC: 4.4 G/DL
ALP SERPL-CCNC: 63 U/L
ALT SERPL W/O P-5'-P-CCNC: 24 U/L
ANION GAP SERPL CALC-SCNC: 9 MMOL/L
AST SERPL-CCNC: 27 U/L
BILIRUB SERPL-MCNC: 1.1 MG/DL
BUN SERPL-MCNC: 11 MG/DL
CALCIUM SERPL-MCNC: 9.8 MG/DL
CHLORIDE SERPL-SCNC: 103 MMOL/L
CHOLEST SERPL-MCNC: 145 MG/DL
CHOLEST/HDLC SERPL: 3.9 {RATIO}
CK SERPL-CCNC: 238 U/L
CO2 SERPL-SCNC: 28 MMOL/L
CREAT SERPL-MCNC: 1.2 MG/DL
EST. GFR  (AFRICAN AMERICAN): >60 ML/MIN/1.73 M^2
EST. GFR  (NON AFRICAN AMERICAN): 53.7 ML/MIN/1.73 M^2
GLUCOSE SERPL-MCNC: 106 MG/DL
HDLC SERPL-MCNC: 37 MG/DL
HDLC SERPL: 25.5 %
LDLC SERPL CALC-MCNC: 78.6 MG/DL
NONHDLC SERPL-MCNC: 108 MG/DL
POTASSIUM SERPL-SCNC: 4.2 MMOL/L
PROT SERPL-MCNC: 7.7 G/DL
SODIUM SERPL-SCNC: 140 MMOL/L
TRIGL SERPL-MCNC: 147 MG/DL

## 2018-06-06 PROCEDURE — 36415 COLL VENOUS BLD VENIPUNCTURE: CPT | Mod: PO

## 2018-06-06 PROCEDURE — 82550 ASSAY OF CK (CPK): CPT

## 2018-06-06 PROCEDURE — 80053 COMPREHEN METABOLIC PANEL: CPT

## 2018-06-06 PROCEDURE — 80061 LIPID PANEL: CPT

## 2018-06-14 ENCOUNTER — OFFICE VISIT (OUTPATIENT)
Dept: FAMILY MEDICINE | Facility: CLINIC | Age: 83
End: 2018-06-14
Payer: MEDICARE

## 2018-06-14 VITALS
HEART RATE: 64 BPM | SYSTOLIC BLOOD PRESSURE: 138 MMHG | WEIGHT: 169 LBS | OXYGEN SATURATION: 95 % | BODY MASS INDEX: 25.61 KG/M2 | DIASTOLIC BLOOD PRESSURE: 62 MMHG | TEMPERATURE: 99 F | HEIGHT: 68 IN

## 2018-06-14 DIAGNOSIS — I10 ESSENTIAL HYPERTENSION: Primary | ICD-10-CM

## 2018-06-14 DIAGNOSIS — I25.10 CORONARY ARTERY DISEASE DUE TO LIPID RICH PLAQUE: ICD-10-CM

## 2018-06-14 DIAGNOSIS — E03.9 ACQUIRED HYPOTHYROIDISM: ICD-10-CM

## 2018-06-14 DIAGNOSIS — N18.30 CKD (CHRONIC KIDNEY DISEASE) STAGE 3, GFR 30-59 ML/MIN: ICD-10-CM

## 2018-06-14 DIAGNOSIS — K52.0 RADIATION COLITIS: Chronic | ICD-10-CM

## 2018-06-14 DIAGNOSIS — M54.50 BILATERAL LOW BACK PAIN WITHOUT SCIATICA, UNSPECIFIED CHRONICITY: ICD-10-CM

## 2018-06-14 DIAGNOSIS — Z85.46 HISTORY OF PROSTATE CANCER: ICD-10-CM

## 2018-06-14 DIAGNOSIS — I25.83 CORONARY ARTERY DISEASE DUE TO LIPID RICH PLAQUE: ICD-10-CM

## 2018-06-14 PROCEDURE — 99213 OFFICE O/P EST LOW 20 MIN: CPT | Mod: PBBFAC,PN | Performed by: FAMILY MEDICINE

## 2018-06-14 PROCEDURE — 99999 PR PBB SHADOW E&M-EST. PATIENT-LVL III: CPT | Mod: PBBFAC,,, | Performed by: FAMILY MEDICINE

## 2018-06-14 PROCEDURE — 99214 OFFICE O/P EST MOD 30 MIN: CPT | Mod: S$PBB,,, | Performed by: FAMILY MEDICINE

## 2018-06-14 RX ORDER — ESOMEPRAZOLE MAGNESIUM 40 MG/1
CAPSULE, DELAYED RELEASE ORAL
COMMUNITY
End: 2021-06-15 | Stop reason: SDUPTHER

## 2018-06-14 RX ORDER — VITAMIN E 268 MG
400 CAPSULE ORAL DAILY
COMMUNITY

## 2018-06-14 RX ORDER — ASPIRIN 81 MG/1
TABLET ORAL
COMMUNITY
End: 2019-01-09 | Stop reason: ALTCHOICE

## 2018-06-14 RX ORDER — LINACLOTIDE 290 UG/1
CAPSULE, GELATIN COATED ORAL
COMMUNITY
End: 2019-10-02

## 2018-06-14 RX ORDER — VIT C/E/ZN/COPPR/LUTEIN/ZEAXAN 250MG-90MG
1000 CAPSULE ORAL DAILY
COMMUNITY

## 2018-06-14 NOTE — PROGRESS NOTES
Subjective:     THIS DOCUMENT WAS MADE IN PART WITH [employer]CTATION SOFTWARE.  OCCASIONALLY THIS SOFTWARE WILL MISINTERPRET WORDS OR PHRASES.     Patient ID: Melvin Powers is a 88 y.o. male.    Chief Complaint: Follow-up    HPI       Essential hypertension  Stable, controlled    CKD (chronic kidney disease) stage 3, GFR 30-59 ml/min  Stable, no NSAID usage.  He does remain on a diuretic, avoid excessive time out in the heat and monitors for adequate hydration    Acquired hypothyroidism  Controlled, stable on levothyroxine    Coronary artery disease due to lipid rich plaque  Stable, no angina, seeing cardiology/Salam    Bilateral low back pain without sciatica, unspecified chronicity  New complaint  -  Location:  lumbar  -  Quality:  ache  -  Severity: mild to moderate  -  Duration:  Several weeks  -  Timing:after activity  -  Context:  Has had mild lbp in past  -  Modifying factors:  Heat helps  -  Associated signs and symptoms:  No numbness or weakness    Has been losing weight with diet.    Reviewed his history of prostate cancer.  No recent Urology consult but this appears stable.  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 but more recently has had difficulty with constipation and no recent GI bleeding    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    Irritable bowel syndrome with constipation 11/30/2016    Dizziness and giddiness 12/05/2017    NSTEMI (non-ST elevated myocardial infarction) 03/17/2018    Acquired  hypothyroidism 03/19/2018     Resolved Ambulatory Problems     Diagnosis Date Noted    No Resolved Ambulatory Problems     Past Medical History:   Diagnosis Date    Cataract     CKD (chronic kidney disease) stage 3, GFR 30-59 ml/min 3/16/2014    Coronary artery disease     Elevated PSA     GERD (gastroesophageal reflux disease)     Hypertension 7/19/2012    Hypothyroidism 3/19/2018    Irritable bowel syndrome with constipation 11/30/2016    Thyroid disease     Ulcer      The  Review of Systems   Constitutional: Negative for appetite change, chills, fatigue, fever and unexpected weight change.   HENT: Negative for congestion, sinus pressure, trouble swallowing and voice change.    Eyes: Negative for pain and visual disturbance.   Respiratory: Negative for cough, chest tightness and shortness of breath.    Cardiovascular: Negative for chest pain, palpitations and leg swelling.   Gastrointestinal: Positive for constipation (improved). Negative for abdominal pain, blood in stool, diarrhea, nausea and vomiting.   Genitourinary: Negative for dysuria, frequency and hematuria.   Musculoskeletal: Positive for arthralgias and back pain. Negative for neck pain.   Skin: Negative for rash and wound.   Neurological: Negative for syncope, weakness and light-headedness.   Psychiatric/Behavioral: Negative.        Objective:      Physical Exam   Constitutional: He is oriented to person, place, and time. He appears well-developed and well-nourished. No distress.   HENT:   Head: Normocephalic and atraumatic.   Eyes: No scleral icterus.   Neck: Normal range of motion. Neck supple.   Cardiovascular: Normal rate, regular rhythm and normal heart sounds.  Exam reveals no gallop.    No murmur heard.  No edema, no JVD   Pulmonary/Chest: Effort normal. No respiratory distress.   Neurological: He is alert and oriented to person, place, and time. He has normal reflexes.   Skin: Skin is warm and dry. He is not diaphoretic.  "  Psychiatric: He has a normal mood and affect. His behavior is normal.   Vitals reviewed.      Vitals:    06/14/18 1309   BP: 138/62   BP Location: Left arm   Patient Position: Sitting   BP Method: Medium (Manual)   Pulse: 64   Temp: 98.7 °F (37.1 °C)   TempSrc: Oral   SpO2: 95%   Weight: 76.6 kg (168 lb 15.7 oz)   Height: 5' 8" (1.727 m)     Results for orders placed or performed in visit on 06/06/18   Lipid panel   Result Value Ref Range    Cholesterol 145 120 - 199 mg/dL    Triglycerides 147 30 - 150 mg/dL    HDL 37 (L) 40 - 75 mg/dL    LDL Cholesterol 78.6 63.0 - 159.0 mg/dL    HDL/Chol Ratio 25.5 20.0 - 50.0 %    Total Cholesterol/HDL Ratio 3.9 2.0 - 5.0    Non-HDL Cholesterol 108 mg/dL   CK   Result Value Ref Range     (H) 20 - 200 U/L   Comprehensive metabolic panel   Result Value Ref Range    Sodium 140 136 - 145 mmol/L    Potassium 4.2 3.5 - 5.1 mmol/L    Chloride 103 95 - 110 mmol/L    CO2 28 23 - 29 mmol/L    Glucose 106 70 - 110 mg/dL    BUN, Bld 11 8 - 23 mg/dL    Creatinine 1.2 0.5 - 1.4 mg/dL    Calcium 9.8 8.7 - 10.5 mg/dL    Total Protein 7.7 6.0 - 8.4 g/dL    Albumin 4.4 3.5 - 5.2 g/dL    Total Bilirubin 1.1 (H) 0.1 - 1.0 mg/dL    Alkaline Phosphatase 63 55 - 135 U/L    AST 27 10 - 40 U/L    ALT 24 10 - 44 U/L    Anion Gap 9 8 - 16 mmol/L    eGFR if African American >60.0 >60 mL/min/1.73 m^2    eGFR if non  53.7 (A) >60 mL/min/1.73 m^2       Assessment:       1. Essential hypertension    2. CKD (chronic kidney disease) stage 3, GFR 30-59 ml/min    3. Acquired hypothyroidism    4. Coronary artery disease due to lipid rich plaque    5. Bilateral low back pain without sciatica, unspecified chronicity    6. History of prostate cancer    7. Radiation colitis        Plan:       Melvin was seen today for follow-up.    Diagnoses and all orders for this visit:    Essential hypertension  -     Basic metabolic panel; Future    CKD (chronic kidney disease) stage 3, GFR 30-59 ml/min  -  "    Basic metabolic panel; Future    Acquired hypothyroidism  -     TSH; Future    Coronary artery disease due to lipid rich plaque    Bilateral low back pain without sciatica, unspecified chronicity    History of prostate cancer  Radiation colitis  As above         For his back recommend proper posterior and home exercises.  Offered PT but he does not feel he needs it now.    Everything else is stable and controlled continue current medications and follow back in 3-4 months

## 2018-10-08 ENCOUNTER — LAB VISIT (OUTPATIENT)
Dept: LAB | Facility: HOSPITAL | Age: 83
End: 2018-10-08
Attending: FAMILY MEDICINE
Payer: MEDICARE

## 2018-10-08 DIAGNOSIS — E03.9 ACQUIRED HYPOTHYROIDISM: ICD-10-CM

## 2018-10-08 DIAGNOSIS — I10 ESSENTIAL HYPERTENSION: ICD-10-CM

## 2018-10-08 DIAGNOSIS — N18.30 CKD (CHRONIC KIDNEY DISEASE) STAGE 3, GFR 30-59 ML/MIN: ICD-10-CM

## 2018-10-08 LAB
ANION GAP SERPL CALC-SCNC: 8 MMOL/L
BUN SERPL-MCNC: 18 MG/DL
CALCIUM SERPL-MCNC: 9.5 MG/DL
CHLORIDE SERPL-SCNC: 102 MMOL/L
CO2 SERPL-SCNC: 28 MMOL/L
CREAT SERPL-MCNC: 1.2 MG/DL
EST. GFR  (AFRICAN AMERICAN): >60 ML/MIN/1.73 M^2
EST. GFR  (NON AFRICAN AMERICAN): 53.7 ML/MIN/1.73 M^2
GLUCOSE SERPL-MCNC: 100 MG/DL
POTASSIUM SERPL-SCNC: 4.2 MMOL/L
SODIUM SERPL-SCNC: 138 MMOL/L
TSH SERPL DL<=0.005 MIU/L-ACNC: 2.41 UIU/ML

## 2018-10-08 PROCEDURE — 84443 ASSAY THYROID STIM HORMONE: CPT

## 2018-10-08 PROCEDURE — 36415 COLL VENOUS BLD VENIPUNCTURE: CPT | Mod: PO

## 2018-10-08 PROCEDURE — 80048 BASIC METABOLIC PNL TOTAL CA: CPT

## 2018-10-16 ENCOUNTER — OFFICE VISIT (OUTPATIENT)
Dept: FAMILY MEDICINE | Facility: CLINIC | Age: 83
End: 2018-10-16
Payer: MEDICARE

## 2018-10-16 VITALS
BODY MASS INDEX: 26.18 KG/M2 | DIASTOLIC BLOOD PRESSURE: 70 MMHG | WEIGHT: 172.75 LBS | TEMPERATURE: 99 F | SYSTOLIC BLOOD PRESSURE: 136 MMHG | HEART RATE: 68 BPM | HEIGHT: 68 IN | RESPIRATION RATE: 17 BRPM

## 2018-10-16 DIAGNOSIS — M54.50 CHRONIC BILATERAL LOW BACK PAIN WITHOUT SCIATICA: ICD-10-CM

## 2018-10-16 DIAGNOSIS — R60.9 EDEMA, UNSPECIFIED TYPE: ICD-10-CM

## 2018-10-16 DIAGNOSIS — E03.9 HYPOTHYROIDISM, UNSPECIFIED TYPE: ICD-10-CM

## 2018-10-16 DIAGNOSIS — G89.29 CHRONIC BILATERAL LOW BACK PAIN WITHOUT SCIATICA: ICD-10-CM

## 2018-10-16 DIAGNOSIS — I70.0 AORTIC ATHEROSCLEROSIS: ICD-10-CM

## 2018-10-16 DIAGNOSIS — I10 ESSENTIAL HYPERTENSION: Primary | ICD-10-CM

## 2018-10-16 DIAGNOSIS — Z85.46 HISTORY OF PROSTATE CANCER: ICD-10-CM

## 2018-10-16 DIAGNOSIS — N18.30 CKD (CHRONIC KIDNEY DISEASE) STAGE 3, GFR 30-59 ML/MIN: ICD-10-CM

## 2018-10-16 PROCEDURE — 99213 OFFICE O/P EST LOW 20 MIN: CPT | Mod: PBBFAC,PN,25 | Performed by: FAMILY MEDICINE

## 2018-10-16 PROCEDURE — 99999 PR PBB SHADOW E&M-EST. PATIENT-LVL III: CPT | Mod: PBBFAC,,, | Performed by: FAMILY MEDICINE

## 2018-10-16 PROCEDURE — 90662 IIV NO PRSV INCREASED AG IM: CPT | Mod: PBBFAC,PN

## 2018-10-16 PROCEDURE — 99214 OFFICE O/P EST MOD 30 MIN: CPT | Mod: S$PBB,,, | Performed by: FAMILY MEDICINE

## 2018-10-16 RX ORDER — NITROGLYCERIN 20 MG/1
1 PATCH TRANSDERMAL DAILY
Refills: 11 | COMMUNITY
Start: 2018-08-31

## 2018-10-16 NOTE — PROGRESS NOTES
THIS DOCUMENT WAS MADE IN PART WITH VOICE RECOGNITION SOFTWARE.  OCCASIONALLY THIS SOFTWARE WILL MISINTERPRET WORDS OR PHRASES.      Melvin Powers  3/17/1930    Melvin was seen today for hypertension and foot swelling.    Diagnoses and all orders for this visit:    Essential hypertension  chronic, stable and well controlled    Hypothyroidism, unspecified type  chronic, stable and well controlled    CKD (chronic kidney disease) stage 3, GFR 30-59 ml/min  Stable, reviewed, meds, fluids, etc    History of prostate cancer  Stable    Chronic bilateral low back pain without sciatica  Improved, seems to be aggravated by recliner, advised stretching and lumbar support    Edema, unspecified type  Stable, discussed sodium, compression, increasing activity    Still seeing Dr. Zimmer,   For CAD and atherosclerosis, stable, no angina    Subjective     Chief Complaint   Patient presents with    Hypertension     follow up     Foot Swelling     states the they have been swelling off and on        HPI    Hypertension, this is a chronic condition that is stable and satisfactory.  Reviewed medications, systems, everything is appropriate.      Others stable    HPI elements addressed above in the assessment and plan including problems, diagnosis, stability/instability,  improving/worsening, and chronicity will not be duplicated in this section. Any important additional HPI topics will be discussed here if needed.    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    Irritable  bowel syndrome with constipation 11/30/2016    Dizziness and giddiness 12/05/2017    NSTEMI (non-ST elevated myocardial infarction) 03/17/2018    Acquired hypothyroidism 03/19/2018     Resolved Ambulatory Problems     Diagnosis Date Noted    No Resolved Ambulatory Problems     Past Medical History:   Diagnosis Date    Cataract     CKD (chronic kidney disease) stage 3, GFR 30-59 ml/min 3/16/2014    Coronary artery disease     GERD (gastroesophageal reflux disease)     Irritable bowel syndrome with constipation 11/30/2016         Review of Systems   Constitutional: Negative for appetite change, fever and unexpected weight change.   HENT: Negative for congestion, sinus pressure, trouble swallowing and voice change.    Eyes: Negative for visual disturbance.   Respiratory: Positive for cough. Negative for chest tightness and shortness of breath.         Congestion and cough, white sputum, few days   Cardiovascular: Negative for chest pain, palpitations and leg swelling.   Gastrointestinal: Positive for constipation (managed well now). Negative for abdominal pain, blood in stool, diarrhea, nausea and vomiting.   Genitourinary: Negative for dysuria, frequency and hematuria.   Musculoskeletal: Positive for arthralgias and back pain. Negative for neck pain.   Skin: Negative for rash and wound.   Neurological: Negative for syncope, weakness and light-headedness.   Psychiatric/Behavioral: Negative.  Negative for dysphoric mood and suicidal ideas.       Objective     Physical Exam   Constitutional: He is oriented to person, place, and time. He appears well-developed and well-nourished. No distress.   HENT:   Head: Normocephalic and atraumatic.   Eyes: Conjunctivae are normal. Right eye exhibits no discharge. Left eye exhibits no discharge. No scleral icterus.   Neck: Normal range of motion. Neck supple.   Cardiovascular: Normal rate, regular rhythm and normal heart sounds. Exam reveals no gallop.   No murmur  "heard.  Trace foot edema, no JVD   Pulmonary/Chest: Effort normal and breath sounds normal. No stridor. No respiratory distress. He has no wheezes. He has no rales.   Neurological: He is alert and oriented to person, place, and time. He has normal reflexes.   Skin: Skin is warm and dry. He is not diaphoretic.   Psychiatric: He has a normal mood and affect. His behavior is normal.   Vitals reviewed.    Vitals:    10/16/18 1310   BP: 136/70   BP Location: Left arm   Patient Position: Sitting   BP Method: Large (Manual)   Pulse: 68   Resp: 17   Temp: 99 °F (37.2 °C)   TempSrc: Oral   Weight: 78.4 kg (172 lb 11.7 oz)   Height: 5' 8" (1.727 m)       MOST RECENT LABS IN OUR ELECTRONIC MEDICAL RECORD:     Results for orders placed or performed in visit on 10/08/18   TSH   Result Value Ref Range    TSH 2.407 0.400 - 4.000 uIU/mL   Basic metabolic panel   Result Value Ref Range    Sodium 138 136 - 145 mmol/L    Potassium 4.2 3.5 - 5.1 mmol/L    Chloride 102 95 - 110 mmol/L    CO2 28 23 - 29 mmol/L    Glucose 100 70 - 110 mg/dL    BUN, Bld 18 8 - 23 mg/dL    Creatinine 1.2 0.5 - 1.4 mg/dL    Calcium 9.5 8.7 - 10.5 mg/dL    Anion Gap 8 8 - 16 mmol/L    eGFR if African American >60.0 >60 mL/min/1.73 m^2    eGFR if non  53.7 (A) >60 mL/min/1.73 m^2         "

## 2018-10-16 NOTE — PROGRESS NOTES
THIS DOCUMENT WAS MADE IN PART WITH VOICE RECOGNITION SOFTWARE.  OCCASIONALLY THIS SOFTWARE WILL MISINTERPRET WORDS OR PHRASES.      Melvin Powers  3/17/1930    Melvin was seen today for hypertension and foot swelling.    Diagnoses and all orders for this visit:    Essential hypertension    Hypothyroidism, unspecified type    CKD (chronic kidney disease) stage 3, GFR 30-59 ml/min        Subjective     Chief Complaint   Patient presents with    Hypertension     follow up     Foot Swelling     states the they have been swelling off and on        HPI      HPI elements addressed above in the assessment and plan including problems, diagnosis, stability/instability,  improving/worsening, and chronicity will not be duplicated in this section. Any important additional HPI topics will be discussed here if needed.    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    Irritable bowel syndrome with constipation 11/30/2016    Dizziness and giddiness 12/05/2017    NSTEMI (non-ST elevated myocardial infarction) 03/17/2018    Acquired hypothyroidism 03/19/2018     Resolved Ambulatory Problems     Diagnosis Date Noted    No Resolved Ambulatory Problems     Past Medical History:   Diagnosis Date    Cataract     CKD (chronic kidney disease) stage 3, GFR 30-59 ml/min 3/16/2014    Coronary artery disease     GERD (gastroesophageal reflux disease)     Irritable bowel syndrome with constipation 11/30/2016         Review of Systems    Objective     Physical Exam  Vitals:     "10/16/18 1310   BP: 136/70   BP Location: Left arm   Patient Position: Sitting   BP Method: Large (Manual)   Pulse: 68   Resp: 17   Temp: 99 °F (37.2 °C)   TempSrc: Oral   Weight: 78.4 kg (172 lb 11.7 oz)   Height: 5' 8" (1.727 m)       MOST RECENT LABS IN OUR ELECTRONIC MEDICAL RECORD:     Results for orders placed or performed in visit on 10/08/18   TSH   Result Value Ref Range    TSH 2.407 0.400 - 4.000 uIU/mL   Basic metabolic panel   Result Value Ref Range    Sodium 138 136 - 145 mmol/L    Potassium 4.2 3.5 - 5.1 mmol/L    Chloride 102 95 - 110 mmol/L    CO2 28 23 - 29 mmol/L    Glucose 100 70 - 110 mg/dL    BUN, Bld 18 8 - 23 mg/dL    Creatinine 1.2 0.5 - 1.4 mg/dL    Calcium 9.5 8.7 - 10.5 mg/dL    Anion Gap 8 8 - 16 mmol/L    eGFR if African American >60.0 >60 mL/min/1.73 m^2    eGFR if non  53.7 (A) >60 mL/min/1.73 m^2         "

## 2019-01-03 ENCOUNTER — TELEPHONE (OUTPATIENT)
Dept: FAMILY MEDICINE | Facility: CLINIC | Age: 84
End: 2019-01-03

## 2019-01-03 NOTE — TELEPHONE ENCOUNTER
Spouse states that patient has sore throat and cold. Asking for medication.  Advised that he will need to be seen .Refused to be seen.  Asking to have something for the cough called in. Denies fever. Please advise while Dr Eastman is out.

## 2019-01-03 NOTE — TELEPHONE ENCOUNTER
----- Message from Demi Bee sent at 1/3/2019 10:47 AM CST -----  Contact: Wife Berna   Patient is congested and coughing, wife would like to know what can he take over the counter. Please call back at [phone] (home)

## 2019-01-03 NOTE — TELEPHONE ENCOUNTER
I recommend that he start with over the counter medication such as guaifenesin for the cough. With his medical history he needs to avoid over the counter medications containing pseudoephedrine and phenylephrine. If this is not helping or if he develops any fever, shortness of breath, wheezing, or new concerning symptoms he would need to be seen.

## 2019-01-21 ENCOUNTER — HOSPITAL ENCOUNTER (OUTPATIENT)
Dept: RADIOLOGY | Facility: HOSPITAL | Age: 84
Discharge: HOME | End: 2019-01-21
Attending: NURSE PRACTITIONER
Payer: MEDICARE

## 2019-01-21 ENCOUNTER — OFFICE VISIT (OUTPATIENT)
Dept: FAMILY MEDICINE | Facility: CLINIC | Age: 84
End: 2019-01-21
Payer: MEDICARE

## 2019-01-21 ENCOUNTER — TELEPHONE (OUTPATIENT)
Dept: FAMILY MEDICINE | Facility: CLINIC | Age: 84
End: 2019-01-21

## 2019-01-21 VITALS
HEIGHT: 68 IN | WEIGHT: 170.19 LBS | BODY MASS INDEX: 25.79 KG/M2 | SYSTOLIC BLOOD PRESSURE: 122 MMHG | DIASTOLIC BLOOD PRESSURE: 66 MMHG | TEMPERATURE: 98 F | HEART RATE: 72 BPM

## 2019-01-21 DIAGNOSIS — R05.9 COUGH: Primary | ICD-10-CM

## 2019-01-21 DIAGNOSIS — I70.0 AORTIC ATHEROSCLEROSIS: ICD-10-CM

## 2019-01-21 DIAGNOSIS — R07.81 PLEURITIC CHEST PAIN: ICD-10-CM

## 2019-01-21 DIAGNOSIS — R05.9 COUGH: ICD-10-CM

## 2019-01-21 PROCEDURE — 99214 OFFICE O/P EST MOD 30 MIN: CPT | Mod: S$PBB,,, | Performed by: NURSE PRACTITIONER

## 2019-01-21 PROCEDURE — 71046 X-RAY EXAM CHEST 2 VIEWS: CPT | Mod: 26,,, | Performed by: RADIOLOGY

## 2019-01-21 PROCEDURE — 99999 PR PBB SHADOW E&M-EST. PATIENT-LVL V: CPT | Mod: PBBFAC,,, | Performed by: NURSE PRACTITIONER

## 2019-01-21 PROCEDURE — 99999 PR PBB SHADOW E&M-EST. PATIENT-LVL V: ICD-10-PCS | Mod: PBBFAC,,, | Performed by: NURSE PRACTITIONER

## 2019-01-21 PROCEDURE — 99215 OFFICE O/P EST HI 40 MIN: CPT | Mod: PBBFAC,25,PN | Performed by: NURSE PRACTITIONER

## 2019-01-21 PROCEDURE — 99214 PR OFFICE/OUTPT VISIT, EST, LEVL IV, 30-39 MIN: ICD-10-PCS | Mod: S$PBB,,, | Performed by: NURSE PRACTITIONER

## 2019-01-21 PROCEDURE — 71046 XR CHEST PA AND LATERAL: ICD-10-PCS | Mod: 26,,, | Performed by: RADIOLOGY

## 2019-01-21 PROCEDURE — 71046 X-RAY EXAM CHEST 2 VIEWS: CPT | Mod: TC,PN

## 2019-01-21 RX ORDER — BENZONATATE 100 MG/1
100 CAPSULE ORAL NIGHTLY
COMMUNITY
End: 2019-03-18

## 2019-01-21 RX ORDER — CIPROFLOXACIN 500 MG/1
500 TABLET ORAL 2 TIMES DAILY
COMMUNITY
End: 2019-03-18

## 2019-01-21 RX ORDER — AMOXICILLIN 500 MG/1
500 CAPSULE ORAL 3 TIMES DAILY
Qty: 21 CAPSULE | Refills: 0 | Status: SHIPPED | OUTPATIENT
Start: 2019-01-21 | End: 2019-01-28

## 2019-01-21 NOTE — PROGRESS NOTES
This dictation has been generated using Modal Fluency Dictation some phonetic errors may occur. Please contact author for clarification if needed.     Problem List Items Addressed This Visit     Aortic atherosclerosis    Overview     Noted on cxr. Pt on statin and med mgmt for cad/htn.            Other Visit Diagnoses     Cough    -  Primary    Relevant Orders    X-Ray Chest PA And Lateral (Completed)    Pleuritic chest pain        Relevant Orders    X-Ray Chest PA And Lateral (Completed)          Orders Placed This Encounter    X-Ray Chest PA And Lateral    amoxicillin (AMOXIL) 500 MG capsule       Cough and pleuritic chest pain check chest x-ray as above.  I will review and address accordingly.  Amoxicillin empiric for pneumonia.  Patient Instructions   Mucinex DM up to twice  Day.   Claritin(loratadine) 10 mg daily for post nasal drip causing cough.        Follow-up if symptoms worsen or fail to improve.    ________________________________________________________________  ________________________________________________________________      Chief Complaint   Patient presents with    URI     green mucus comes from cough and nasal passages, pt has been taking cipro 500mg Bid, and bensonate 100mg 1 tab nightly     History of present illness  This 88 y.o. presents today for complaint of cough symptoms.  Symptoms started a week ago.  He had some sore throat and earache symptoms at that time.  Those issues have resolved.  Patient notes coughing.  He notes coughing at night time.  He has had some difficulty resting.  He tried Robitussin DM Benzonate and started some Cipro.  We discussed in proper utilization of antibiotic and Cipro does not really give adequate lower respiratory coverage.  Chest symptoms have worsened.  Review of systems  No fever.  Patient does note chills.  No body aches.  No chest pain or shortness of breath.  Patient does note some lung pain in his back when coughing.  No nausea vomiting or  diarrhea good appetite  Patient denies rash    Past medical and social history reviewed.  Patient is new to me.  Follows with 1 of my partners.    Past Medical History:   Diagnosis Date    Cataract     CKD (chronic kidney disease) stage 3, GFR 30-59 ml/min 3/16/2014    Coronary artery disease     GERD (gastroesophageal reflux disease)     Irritable bowel syndrome with constipation 11/30/2016       Past Surgical History:   Procedure Laterality Date    APPENDECTOMY      COLONOSCOPY      CYSTOSCOPY      Left heart cath Left 3/19/2018    [facility] CATH    pterygium removal Left     UPPER GASTROINTESTINAL ENDOSCOPY         Family History   Problem Relation Age of Onset    Rheum arthritis Mother     Cancer Father     Rheum arthritis Father     Cancer Sister         bone    Cancer Brother        Social History     Socioeconomic History    Marital status:      Spouse name: None    Number of children: None    Years of education: None    Highest education level: None   Social Needs    Financial resource strain: None    Food insecurity - worry: None    Food insecurity - inability: None    Transportation needs - medical: None    Transportation needs - non-medical: None   Occupational History    None   Tobacco Use    Smoking status: Never Smoker    Smokeless tobacco: Never Used   Substance and Sexual Activity    Alcohol use: No    Drug use: No    Sexual activity: None   Other Topics Concern    None   Social History Narrative    None       Current Outpatient Medications   Medication Sig Dispense Refill    benzonatate (TESSALON) 100 MG capsule Take 100 mg by mouth every evening.      cholecalciferol, vitamin D3, (VITAMIN D3) 1,000 unit capsule Vitamin D3      ciprofloxacin HCl (CIPRO) 500 MG tablet Take 500 mg by mouth 2 (two) times daily.      clopidogrel (PLAVIX) 75 mg tablet Take 1 tablet (75 mg total) by mouth once daily. 90 tablet 3    coenzyme Q10  "(COQ-10) 100 mg capsule Take 100 mg by mouth 2 (two) times daily.      esomeprazole (NEXIUM) 40 MG capsule Nexium 40 mg capsule,delayed release   Take 1 capsule every day by oral route as needed.      furosemide (LASIX) 20 MG tablet Take 20 mg by mouth 2 (two) times daily.      lactulose (KRISTALOSE) 10 gram packet One packet tid 270 each 3    linaclotide (LINZESS) 290 mcg Cap Linzess 290 mcg capsule   Take 1 capsule every day by oral route in the morning for 90 days.      magnesium gluconate 27.5 mg (500 mg) Tab Take 500 mg by mouth 2 (two) times daily.      meclizine (ANTIVERT) 25 mg tablet Take 1 tablet (25 mg total) by mouth 3 (three) times daily as needed. 60 tablet 0    metoprolol succinate (TOPROL-XL) 25 MG 24 hr tablet Take 1 tablet (25 mg total) by mouth once daily. 90 tablet 3    multivitamin (THERAGRAN) per tablet Take 1 tablet by mouth once daily. 100 tablet 3    nitroGLYCERIN 0.1 mg/hr TD PT24 (NITRODUR) 0.1 mg/hr PT24 PLACE 1 PATCH ONTO THE SKIN ONCE D  11    rosuvastatin (CRESTOR) 40 MG Tab Take 1 tablet (40 mg total) by mouth once daily. 90 tablet 3    SYNTHROID 75 mcg tablet TAKE 1 TABLET ONCE DAILY 90 tablet 3    vitamin E 400 UNIT capsule vitamin E 400 unit capsule   Take 1 capsule every day by oral route.      amoxicillin (AMOXIL) 500 MG capsule Take 1 capsule (500 mg total) by mouth 3 (three) times daily. for 7 days 21 capsule 0     No current facility-administered medications for this visit.        Review of patient's allergies indicates:   Allergen Reactions    Atorvastatin      Other reaction(s): Muscle pain    Codeine      Other reaction(s): makes him"goofey"    Iodinated contrast- oral and iv dye      Other reaction(s): Rash       Physical examination  Vitals Reviewed  Gen. Well-dressed well-nourished.  Patient looks sick not septic.  Skin warm dry and intact.  No rashes noted.  HEENT.  TM intact bilateral with normal light reflex.  No mastoid tenderness during percussion.  " Nares patent bilateral.  Pharynx is unremarkable.  No maxillary or frontal sinus tenderness when percussed.    Neck is supple without adenopathy  Chest.  Respirations are even unlabored.  Lungs are clear to auscultation.  Cardiac regular rate and rhythm.  No chest wall adenopathy noted.  Neuro. Awake alert oriented x4.  Normal judgment and cognition noted.  Extremities no clubbing cyanosis or edema noted.     Call or return to clinic prn if these symptoms worsen or fail to improve as anticipated.

## 2019-01-21 NOTE — PATIENT INSTRUCTIONS
Mucinex DM up to twice  Day.   Claritin(loratadine) 10 mg daily for post nasal drip causing cough.

## 2019-01-21 NOTE — TELEPHONE ENCOUNTER
Spoke to patient's wife and scheduled appt with Luca Redding NP  Today at 11:20 am for eval and tx

## 2019-01-21 NOTE — TELEPHONE ENCOUNTER
----- Message from Sybil Hauser sent at 1/21/2019  9:50 AM CST -----  Contact: Patient's wife, Mina  Type: Needs Medical Advice    Who Called:  Patient's wife  Symptoms (please be specific):  Bad cold and sore throat  How long has patient had these symptoms:  A week  Pharmacy name and phone #:    [facility] [address] [place] [address] AT SEC OF [address] [facility] & [address] [address]  Phone: [phone] Fax: [phone]  Best Call Back Number: [phone]  Additional Information:

## 2019-03-07 RX ORDER — LEVOTHYROXINE SODIUM 75 UG/1
75 TABLET ORAL DAILY
Qty: 90 TABLET | Refills: 3 | Status: SHIPPED | OUTPATIENT
Start: 2019-03-07 | End: 2020-04-14

## 2019-03-07 RX ORDER — ROSUVASTATIN CALCIUM 40 MG/1
40 TABLET, COATED ORAL DAILY
Qty: 90 TABLET | Refills: 3 | Status: SHIPPED | OUTPATIENT
Start: 2019-03-07 | End: 2020-04-14

## 2019-03-11 ENCOUNTER — LAB VISIT (OUTPATIENT)
Dept: LAB | Facility: HOSPITAL | Age: 84
End: 2019-03-11
Attending: FAMILY MEDICINE
Payer: MEDICARE

## 2019-03-11 DIAGNOSIS — I10 ESSENTIAL HYPERTENSION: ICD-10-CM

## 2019-03-11 DIAGNOSIS — E03.9 HYPOTHYROIDISM, UNSPECIFIED TYPE: ICD-10-CM

## 2019-03-11 DIAGNOSIS — N18.30 CKD (CHRONIC KIDNEY DISEASE) STAGE 3, GFR 30-59 ML/MIN: ICD-10-CM

## 2019-03-11 LAB
ANION GAP SERPL CALC-SCNC: 8 MMOL/L
BUN SERPL-MCNC: 14 MG/DL
CALCIUM SERPL-MCNC: 9.7 MG/DL
CHLORIDE SERPL-SCNC: 105 MMOL/L
CO2 SERPL-SCNC: 27 MMOL/L
CREAT SERPL-MCNC: 1 MG/DL
EST. GFR  (AFRICAN AMERICAN): >60 ML/MIN/1.73 M^2
EST. GFR  (NON AFRICAN AMERICAN): >60 ML/MIN/1.73 M^2
GLUCOSE SERPL-MCNC: 109 MG/DL
POTASSIUM SERPL-SCNC: 4.6 MMOL/L
SODIUM SERPL-SCNC: 140 MMOL/L
TSH SERPL DL<=0.005 MIU/L-ACNC: 2.16 UIU/ML

## 2019-03-11 PROCEDURE — 80048 BASIC METABOLIC PNL TOTAL CA: CPT

## 2019-03-11 PROCEDURE — 36415 COLL VENOUS BLD VENIPUNCTURE: CPT | Mod: PO

## 2019-03-11 PROCEDURE — 84443 ASSAY THYROID STIM HORMONE: CPT

## 2019-03-18 ENCOUNTER — OFFICE VISIT (OUTPATIENT)
Dept: FAMILY MEDICINE | Facility: CLINIC | Age: 84
End: 2019-03-18
Payer: MEDICARE

## 2019-03-18 VITALS
WEIGHT: 167.44 LBS | BODY MASS INDEX: 25.38 KG/M2 | TEMPERATURE: 98 F | DIASTOLIC BLOOD PRESSURE: 70 MMHG | HEIGHT: 68 IN | RESPIRATION RATE: 16 BRPM | HEART RATE: 74 BPM | SYSTOLIC BLOOD PRESSURE: 138 MMHG

## 2019-03-18 DIAGNOSIS — M25.542 ARTHRALGIA OF HANDS, BILATERAL: ICD-10-CM

## 2019-03-18 DIAGNOSIS — E78.5 DYSLIPIDEMIA: ICD-10-CM

## 2019-03-18 DIAGNOSIS — N18.30 CKD (CHRONIC KIDNEY DISEASE) STAGE 3, GFR 30-59 ML/MIN: ICD-10-CM

## 2019-03-18 DIAGNOSIS — I10 ESSENTIAL HYPERTENSION: Primary | ICD-10-CM

## 2019-03-18 DIAGNOSIS — M25.541 ARTHRALGIA OF HANDS, BILATERAL: ICD-10-CM

## 2019-03-18 DIAGNOSIS — E03.9 HYPOTHYROIDISM, UNSPECIFIED TYPE: ICD-10-CM

## 2019-03-18 PROCEDURE — 99999 PR PBB SHADOW E&M-EST. PATIENT-LVL III: CPT | Mod: PBBFAC,,, | Performed by: FAMILY MEDICINE

## 2019-03-18 PROCEDURE — 99213 OFFICE O/P EST LOW 20 MIN: CPT | Mod: PBBFAC,PN | Performed by: FAMILY MEDICINE

## 2019-03-18 PROCEDURE — 99214 PR OFFICE/OUTPT VISIT, EST, LEVL IV, 30-39 MIN: ICD-10-PCS | Mod: S$PBB,,, | Performed by: FAMILY MEDICINE

## 2019-03-18 PROCEDURE — 99999 PR PBB SHADOW E&M-EST. PATIENT-LVL III: ICD-10-PCS | Mod: PBBFAC,,, | Performed by: FAMILY MEDICINE

## 2019-03-18 PROCEDURE — 99214 OFFICE O/P EST MOD 30 MIN: CPT | Mod: S$PBB,,, | Performed by: FAMILY MEDICINE

## 2019-03-18 RX ORDER — DICLOFENAC SODIUM 10 MG/G
GEL TOPICAL
Qty: 200 G | Refills: 2 | Status: SHIPPED | OUTPATIENT
Start: 2019-03-18 | End: 2020-05-21

## 2019-03-18 RX ORDER — DICLOFENAC SODIUM 10 MG/G
GEL TOPICAL
Qty: 200 G | Refills: 2 | Status: SHIPPED | OUTPATIENT
Start: 2019-03-18 | End: 2019-03-18 | Stop reason: SDUPTHER

## 2019-03-18 NOTE — PROGRESS NOTES
THIS DOCUMENT WAS MADE IN PART WITH VOICE RECOGNITION SOFTWARE.  OCCASIONALLY THIS SOFTWARE WILL MISINTERPRET WORDS OR PHRASES.      Melvin Powers  3/17/1930    Melvin was seen today for follow-up and hand pain.    Diagnoses and all orders for this visit:    Essential hypertension  -     Comprehensive metabolic panel; Future    Hypothyroidism, unspecified type  -     TSH; Future    CKD (chronic kidney disease) stage 3, GFR 30-59 ml/min  Stable    Arthralgia of hands, bilateral  voltaren gel, ortho when ready    Dyslipidemia  -     Lipid panel; Future        Subjective     Chief Complaint   Patient presents with    Follow-up     Essential hypertension    Hand Pain     x 4 weeks pt states they moved into a new house and he had been opening boxes when pain started in left hand        HPI    Hypertension, this appears chronic stable and satisfactory.    Hypothyroidism TSH remains in the normal range, this is stable and satisfactory.    Chronic kidney disease, overall stable    note that I did not order lipids as he sees a cardiologist who manages this.    Bilateral hand pain, worse with move, boxes, packing, etc  Heat helps  Plans to see ortho when 'done' with moving    HPI elements addressed above in the assessment and plan including problems, diagnosis, stability/instability,  improving/worsening, and chronicity will not be duplicated in this section. Any important additional HPI topics will be discussed here if needed.    Active Ambulatory Problems     Diagnosis Date Noted    CAD (coronary artery disease) 07/19/2012    Essential hypertension 07/19/2012    Hypercholesteremia 07/19/2012    GERD (gastroesophageal reflux disease) 07/19/2012    Sleep disorder 07/19/2012    Rectal bleeding 08/15/2012    DDD (degenerative disc disease), lumbar 09/26/2012    Edema 09/27/2013    CKD (chronic kidney disease) stage 3, GFR 30-59 ml/min 03/16/2014    History of prostate cancer 11/12/2014    Radiation colitis  04/06/2016    Irritable bowel syndrome with constipation 11/30/2016    Dizziness and giddiness 12/05/2017    Acquired hypothyroidism 03/19/2018    Aortic atherosclerosis 01/21/2019     Resolved Ambulatory Problems     Diagnosis Date Noted    No Resolved Ambulatory Problems     Past Medical History:   Diagnosis Date    Cataract     CKD (chronic kidney disease) stage 3, GFR 30-59 ml/min 3/16/2014    Coronary artery disease     GERD (gastroesophageal reflux disease)     Irritable bowel syndrome with constipation 11/30/2016         Review of Systems   Constitutional: Negative for appetite change, fever and unexpected weight change.   HENT: Negative for congestion, sinus pressure, trouble swallowing and voice change.    Eyes: Negative for visual disturbance.   Respiratory: Negative for cough, chest tightness and shortness of breath.    Cardiovascular: Negative for chest pain, palpitations and leg swelling.   Gastrointestinal: Positive for constipation (still doing well). Negative for abdominal pain, blood in stool, diarrhea, nausea and vomiting.   Genitourinary: Negative for dysuria, frequency and hematuria.   Musculoskeletal: Positive for arthralgias and back pain. Negative for neck pain.   Skin: Negative for rash and wound.   Neurological: Negative for syncope, weakness and light-headedness.   Psychiatric/Behavioral: Negative.  Negative for dysphoric mood and suicidal ideas.       Objective     Physical Exam   Constitutional: He is oriented to person, place, and time. He appears well-developed and well-nourished. No distress.   HENT:   Head: Normocephalic and atraumatic.   Eyes: Conjunctivae are normal. Right eye exhibits no discharge. Left eye exhibits no discharge. No scleral icterus.   Neck: Normal range of motion. Neck supple. No JVD present.   Cardiovascular: Normal rate, regular rhythm and normal heart sounds. Exam reveals no gallop.   No murmur heard.  Mild foot and ankle edema   Pulmonary/Chest:  "Effort normal and breath sounds normal. No stridor. No respiratory distress. He has no wheezes. He has no rales.   Neurological: He is alert and oriented to person, place, and time. He has normal reflexes.   Ambulatory, no devices   Skin: Skin is warm and dry. He is not diaphoretic.   Psychiatric: He has a normal mood and affect. His behavior is normal.   Vitals reviewed.    Vitals:    03/18/19 1414   BP: 138/70   BP Location: Left arm   Patient Position: Sitting   BP Method: Large (Manual)   Pulse: 74   Resp: 16   Temp: 97.7 °F (36.5 °C)   TempSrc: Oral   Weight: 76 kg (167 lb 7 oz)   Height: 5' 8" (1.727 m)       MOST RECENT LABS IN OUR ELECTRONIC MEDICAL RECORD:     Results for orders placed or performed in visit on 03/11/19   TSH   Result Value Ref Range    TSH 2.158 0.400 - 4.000 uIU/mL   Basic metabolic panel   Result Value Ref Range    Sodium 140 136 - 145 mmol/L    Potassium 4.6 3.5 - 5.1 mmol/L    Chloride 105 95 - 110 mmol/L    CO2 27 23 - 29 mmol/L    Glucose 109 70 - 110 mg/dL    BUN, Bld 14 8 - 23 mg/dL    Creatinine 1.0 0.5 - 1.4 mg/dL    Calcium 9.7 8.7 - 10.5 mg/dL    Anion Gap 8 8 - 16 mmol/L    eGFR if African American >60.0 >60 mL/min/1.73 m^2    eGFR if non African American >60.0 >60 mL/min/1.73 m^2         "

## 2019-03-20 NOTE — TELEPHONE ENCOUNTER
PA was initiated via [email or web address] for Diclofenac Sodium 1% TD GEL awaiting for response from  Medicare.

## 2019-07-26 ENCOUNTER — TELEPHONE (OUTPATIENT)
Dept: FAMILY MEDICINE | Facility: CLINIC | Age: 84
End: 2019-07-26

## 2019-07-26 RX ORDER — CLOTRIMAZOLE AND BETAMETHASONE DIPROPIONATE 10; .5 MG/ML; MG/ML
LOTION TOPICAL 2 TIMES DAILY
Qty: 90 ML | Refills: 3 | Status: SHIPPED | OUTPATIENT
Start: 2019-07-26 | End: 2020-12-23

## 2019-07-26 NOTE — TELEPHONE ENCOUNTER
----- Message from Joan Ahuja sent at 7/26/2019  9:29 AM CDT -----  Type:  RX Refill Request    Who Called:  Wife - Berna Powers  Refill or New Rx:  refill  RX Name and Strength:  Clotrimazole and Betamethasone Dipropionate   How is the patient currently taking it? (ex. 1XDay):  cream  Is this a 30 day or 90 day RX:  3 tube  Preferred Pharmacy with phone number:    [facility] - [place], [place] - [address] AT [address] to [facility]  [address]  [facility] [address]  Phone: [phone] Fax: [phone]  Local or Mail Order:  Mail Order  Ordering Provider:  Dr Mitch Rebolledo  Best Call Back Number:  [phone]  Additional Information:  Please advise when this has been sent to mail order

## 2019-07-26 NOTE — TELEPHONE ENCOUNTER
Pt requesting refill on clotrimazole-betamethasone. This has not been filled since 2017. LOV: 3/18/2019. Please advise. Thanks.

## 2019-08-06 DIAGNOSIS — M79.642 LEFT HAND PAIN: Primary | ICD-10-CM

## 2019-08-07 ENCOUNTER — HOSPITAL ENCOUNTER (OUTPATIENT)
Dept: RADIOLOGY | Facility: HOSPITAL | Age: 84
Discharge: HOME | End: 2019-08-07
Attending: ORTHOPAEDIC SURGERY
Payer: MEDICARE

## 2019-08-07 ENCOUNTER — OFFICE VISIT (OUTPATIENT)
Dept: ORTHOPEDICS | Facility: CLINIC | Age: 84
End: 2019-08-07
Payer: MEDICARE

## 2019-08-07 VITALS — WEIGHT: 167 LBS | BODY MASS INDEX: 25.31 KG/M2 | HEIGHT: 68 IN

## 2019-08-07 DIAGNOSIS — M19.049 HAND ARTHRITIS: Primary | ICD-10-CM

## 2019-08-07 DIAGNOSIS — M79.642 LEFT HAND PAIN: ICD-10-CM

## 2019-08-07 DIAGNOSIS — M79.645 PAIN OF FINGER OF LEFT HAND: ICD-10-CM

## 2019-08-07 PROCEDURE — 20550 NJX 1 TENDON SHEATH/LIGAMENT: CPT | Mod: PBBFAC,PN | Performed by: ORTHOPAEDIC SURGERY

## 2019-08-07 PROCEDURE — 73110 X-RAY EXAM OF WRIST: CPT | Mod: 26,LT,, | Performed by: RADIOLOGY

## 2019-08-07 PROCEDURE — 73110 XR WRIST COMPLETE 3 VIEWS LEFT: ICD-10-PCS | Mod: 26,LT,, | Performed by: RADIOLOGY

## 2019-08-07 PROCEDURE — 73110 X-RAY EXAM OF WRIST: CPT | Mod: TC,PO,LT

## 2019-08-07 PROCEDURE — 73130 X-RAY EXAM OF HAND: CPT | Mod: 26,LT,, | Performed by: RADIOLOGY

## 2019-08-07 PROCEDURE — 20550 TENDON SHEATH: L INDEX MCP: ICD-10-PCS | Mod: S$PBB,LT,, | Performed by: ORTHOPAEDIC SURGERY

## 2019-08-07 PROCEDURE — 73130 XR HAND COMPLETE 3 VIEW LEFT: ICD-10-PCS | Mod: 26,LT,, | Performed by: RADIOLOGY

## 2019-08-07 PROCEDURE — 99203 PR OFFICE/OUTPT VISIT, NEW, LEVL III, 30-44 MIN: ICD-10-PCS | Mod: 25,S$PBB,, | Performed by: ORTHOPAEDIC SURGERY

## 2019-08-07 PROCEDURE — 99203 OFFICE O/P NEW LOW 30 MIN: CPT | Mod: 25,S$PBB,, | Performed by: ORTHOPAEDIC SURGERY

## 2019-08-07 PROCEDURE — 99212 OFFICE O/P EST SF 10 MIN: CPT | Mod: PBBFAC,25,PN | Performed by: ORTHOPAEDIC SURGERY

## 2019-08-07 PROCEDURE — 99999 PR PBB SHADOW E&M-EST. PATIENT-LVL II: ICD-10-PCS | Mod: PBBFAC,,, | Performed by: ORTHOPAEDIC SURGERY

## 2019-08-07 PROCEDURE — 99999 PR PBB SHADOW E&M-EST. PATIENT-LVL II: CPT | Mod: PBBFAC,,, | Performed by: ORTHOPAEDIC SURGERY

## 2019-08-07 PROCEDURE — 73130 X-RAY EXAM OF HAND: CPT | Mod: TC,PO,LT

## 2019-08-07 RX ORDER — TRIAMCINOLONE ACETONIDE 40 MG/ML
40 INJECTION, SUSPENSION INTRA-ARTICULAR; INTRAMUSCULAR
Status: DISCONTINUED | OUTPATIENT
Start: 2019-08-07 | End: 2019-08-07 | Stop reason: HOSPADM

## 2019-08-07 RX ADMIN — TRIAMCINOLONE ACETONIDE 40 MG: 40 INJECTION, SUSPENSION INTRA-ARTICULAR; INTRAMUSCULAR at 11:08

## 2019-08-07 NOTE — PROCEDURES
Tendon Sheath: L index MCP  Date/Time: 8/7/2019 11:30 AM  Performed by: Brijesh Chau MD  Authorized by: Brijesh Chau MD     Location:  Index finger  Site:  L index MCP  Medications:  40 mg triamcinolone acetonide 40 mg/mL

## 2019-08-07 NOTE — PROGRESS NOTES
HISTORY OF PRESENT ILLNESS:  An 89 year old, pain in the left hand second   finger, had injections in the past, responded well, requesting an injection   today.  Rates the pain as **/10 on a pain scale.    PHYSICAL EXAMINATION:  Today shows outward signs of arthrosis of the hand.  He   has discomfort along the flexor sheath as well.  No signs of infection.    X-rays showed degenerative changes.    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    PLAN:  Kenalog injection to the flexor sheath of the left second finger.  Follow   up as needed.        PBB/HN  dd: 08/07/2019 11:25:09 (CDT)  td: 08/08/2019 04:20:39 (CDT)  Doc ID   #[id number]  Job ID #[id number]    CC:     Further History  Aching pain  Worse with activity  Relieved with rest  No other associated symptoms  No other radiation    Further Exam  Alert and oriented  Pleasant  Contralateral limb has appropriate range of motion for age and condition  Contralateral limb has appropriate strength for age and condition  Contralateral limb has appropriate stability  for age and condition  No adenopathy  Pulses are appropriate for current condition  Skin is intact        Chief Complaint    Chief Complaint   Patient presents with    Right Hand - Pain       HPI  Melvin Powers is a 89 y.o.  male who presents with       Past Medical History  Past Medical History:   Diagnosis Date    Cataract     CKD (chronic kidney disease) stage 3, GFR 30-59 ml/min 3/16/2014    Coronary artery disease     GERD (gastroesophageal reflux disease)     Irritable bowel syndrome with constipation 11/30/2016       Past Surgical History  Past Surgical History:   Procedure Laterality Date    APPENDECTOMY      COLONOSCOPY      CYSTOSCOPY      Left heart cath Left 3/19/2018    Performed by Luz [facility] at [facility] CATH    pterygium removal Left     UPPER GASTROINTESTINAL ENDOSCOPY         Medications  Current Outpatient Medications  "  Medication Sig    cholecalciferol, vitamin D3, (VITAMIN D3) 1,000 unit capsule Vitamin D3    clopidogrel (PLAVIX) 75 mg tablet TAKE 1 TABLET DAILY    clotrimazole-betamethasone (LOTRISONE) lotion Apply topically 2 (two) times daily. Apply twice daily    coenzyme Q10 (COQ-10) 100 mg capsule Take 100 mg by mouth 2 (two) times daily.    diclofenac sodium (VOLTAREN) 1 % Gel Apply bid to tid to hands as needed for pain    esomeprazole (NEXIUM) 40 MG capsule Nexium 40 mg capsule,delayed release   Take 1 capsule every day by oral route as needed.    furosemide (LASIX) 20 MG tablet Take 20 mg by mouth 2 (two) times daily.    lactulose (KRISTALOSE) 10 gram packet One packet tid    levothyroxine (SYNTHROID) 75 MCG tablet Take 1 tablet (75 mcg total) by mouth once daily.    linaclotide (LINZESS) 290 mcg Cap Linzess 290 mcg capsule   Take 1 capsule every day by oral route in the morning for 90 days.    magnesium gluconate 27.5 mg (500 mg) Tab Take 500 mg by mouth 2 (two) times daily.    meclizine (ANTIVERT) 25 mg tablet Take 1 tablet (25 mg total) by mouth 3 (three) times daily as needed.    metoprolol succinate (TOPROL-XL) 25 MG 24 hr tablet TAKE 1 TABLET DAILY    multivitamin (THERAGRAN) per tablet Take 1 tablet by mouth once daily.    nitroGLYCERIN 0.1 mg/hr TD PT24 (NITRODUR) 0.1 mg/hr PT24 PLACE 1 PATCH ONTO THE SKIN ONCE D    rosuvastatin (CRESTOR) 40 MG Tab Take 1 tablet (40 mg total) by mouth once daily.    vitamin E 400 UNIT capsule vitamin E 400 unit capsule   Take 1 capsule every day by oral route.     No current facility-administered medications for this visit.        Allergies  Review of patient's allergies indicates:   Allergen Reactions    Atorvastatin      Other reaction(s): Muscle pain    Codeine      Other reaction(s): makes him"goofey"    Iodinated contrast- oral and iv dye      Other reaction(s): Rash       Family History  Family History   Problem Relation Age of Onset    Rheum " arthritis Mother     Cancer Father     Rheum arthritis Father     Cancer Sister         bone    Cancer Brother        Social History  Social History     Socioeconomic History    Marital status:      Spouse name: Not on file    Number of children: Not on file    Years of education: Not on file    Highest education level: Not on file   Occupational History    Not on file   Social Needs    Financial resource strain: Not on file    Food insecurity:     Worry: Not on file     Inability: Not on file    Transportation needs:     Medical: Not on file     Non-medical: Not on file   Tobacco Use    Smoking status: Never Smoker    Smokeless tobacco: Never Used   Substance and Sexual Activity    Alcohol use: No    Drug use: No    Sexual activity: Not on file   Lifestyle    Physical activity:     Days per week: Not on file     Minutes per session: Not on file    Stress: Not on file   Relationships    Social connections:     Talks on phone: Not on file     Gets together: Not on file     Attends Anabaptist service: Not on file     Active member of club or organization: Not on file     Attends meetings of clubs or organizations: Not on file     Relationship status: Not on file   Other Topics Concern    Not on file   Social History Narrative    Not on file               Review of Systems     Constitutional: Negative    HENT: Negative  Eyes: Negative  Respiratory: Negative  Cardiovascular: Negative  Musculoskeletal: HPI  Skin: Negative  Neurological: Negative  Hematological: Negative  Endocrine: Negative                 Physical Exam    There were no vitals filed for this visit.  Body mass index is 25.39 kg/m².  Physical Examination:     General appearance -  well appearing, and in no distress  Mental status - awake  Neck - supple  Chest -  symmetric air entry  Heart - normal rate   Abdomen - soft      Assessment     1. Hand arthritis    2. Pain of finger of left hand          Plan

## 2019-09-18 ENCOUNTER — PATIENT OUTREACH (OUTPATIENT)
Dept: ADMINISTRATIVE | Facility: HOSPITAL | Age: 84
End: 2019-09-18

## 2019-09-25 ENCOUNTER — LAB VISIT (OUTPATIENT)
Dept: LAB | Facility: HOSPITAL | Age: 84
End: 2019-09-25
Attending: FAMILY MEDICINE
Payer: MEDICARE

## 2019-09-25 DIAGNOSIS — E03.9 HYPOTHYROIDISM, UNSPECIFIED TYPE: ICD-10-CM

## 2019-09-25 DIAGNOSIS — E78.5 DYSLIPIDEMIA: ICD-10-CM

## 2019-09-25 DIAGNOSIS — I10 ESSENTIAL HYPERTENSION: ICD-10-CM

## 2019-09-25 LAB
ALBUMIN SERPL BCP-MCNC: 4.3 G/DL (ref 3.5–5.2)
ALP SERPL-CCNC: 71 U/L (ref 55–135)
ALT SERPL W/O P-5'-P-CCNC: 26 U/L (ref 10–44)
ANION GAP SERPL CALC-SCNC: 9 MMOL/L (ref 8–16)
AST SERPL-CCNC: 25 U/L (ref 10–40)
BILIRUB SERPL-MCNC: 0.9 MG/DL (ref 0.1–1)
BUN SERPL-MCNC: 21 MG/DL (ref 8–23)
CALCIUM SERPL-MCNC: 10 MG/DL (ref 8.7–10.5)
CHLORIDE SERPL-SCNC: 102 MMOL/L (ref 95–110)
CHOLEST SERPL-MCNC: 169 MG/DL (ref 120–199)
CHOLEST/HDLC SERPL: 3.3 {RATIO} (ref 2–5)
CO2 SERPL-SCNC: 28 MMOL/L (ref 23–29)
CREAT SERPL-MCNC: 1.2 MG/DL (ref 0.5–1.4)
EST. GFR  (AFRICAN AMERICAN): >60 ML/MIN/1.73 M^2
EST. GFR  (NON AFRICAN AMERICAN): 53.3 ML/MIN/1.73 M^2
GLUCOSE SERPL-MCNC: 108 MG/DL (ref 70–110)
HDLC SERPL-MCNC: 51 MG/DL (ref 40–75)
HDLC SERPL: 30.2 % (ref 20–50)
LDLC SERPL CALC-MCNC: 99.2 MG/DL (ref 63–159)
NONHDLC SERPL-MCNC: 118 MG/DL
POTASSIUM SERPL-SCNC: 4.3 MMOL/L (ref 3.5–5.1)
PROT SERPL-MCNC: 7.6 G/DL (ref 6–8.4)
SODIUM SERPL-SCNC: 139 MMOL/L (ref 136–145)
TRIGL SERPL-MCNC: 94 MG/DL (ref 30–150)
TSH SERPL DL<=0.005 MIU/L-ACNC: 2.31 UIU/ML (ref 0.4–4)

## 2019-09-25 PROCEDURE — 80053 COMPREHEN METABOLIC PANEL: CPT

## 2019-09-25 PROCEDURE — 80061 LIPID PANEL: CPT

## 2019-09-25 PROCEDURE — 36415 COLL VENOUS BLD VENIPUNCTURE: CPT | Mod: PO

## 2019-09-25 PROCEDURE — 84443 ASSAY THYROID STIM HORMONE: CPT

## 2019-10-02 ENCOUNTER — OFFICE VISIT (OUTPATIENT)
Dept: FAMILY MEDICINE | Facility: CLINIC | Age: 84
End: 2019-10-02
Payer: MEDICARE

## 2019-10-02 VITALS
SYSTOLIC BLOOD PRESSURE: 126 MMHG | WEIGHT: 168.63 LBS | HEART RATE: 80 BPM | RESPIRATION RATE: 16 BRPM | BODY MASS INDEX: 25.56 KG/M2 | HEIGHT: 68 IN | DIASTOLIC BLOOD PRESSURE: 68 MMHG | TEMPERATURE: 98 F

## 2019-10-02 DIAGNOSIS — Z85.46 HISTORY OF PROSTATE CANCER: ICD-10-CM

## 2019-10-02 DIAGNOSIS — E78.5 DYSLIPIDEMIA: ICD-10-CM

## 2019-10-02 DIAGNOSIS — I10 ESSENTIAL HYPERTENSION: Primary | ICD-10-CM

## 2019-10-02 DIAGNOSIS — N18.30 CKD (CHRONIC KIDNEY DISEASE) STAGE 3, GFR 30-59 ML/MIN: ICD-10-CM

## 2019-10-02 DIAGNOSIS — J30.2 SEASONAL ALLERGIES: ICD-10-CM

## 2019-10-02 DIAGNOSIS — E03.9 HYPOTHYROIDISM, UNSPECIFIED TYPE: ICD-10-CM

## 2019-10-02 PROCEDURE — 90662 IIV NO PRSV INCREASED AG IM: CPT | Mod: PBBFAC,PN

## 2019-10-02 PROCEDURE — 99214 OFFICE O/P EST MOD 30 MIN: CPT | Mod: S$PBB,,, | Performed by: FAMILY MEDICINE

## 2019-10-02 PROCEDURE — 99999 PR PBB SHADOW E&M-EST. PATIENT-LVL III: CPT | Mod: PBBFAC,,, | Performed by: FAMILY MEDICINE

## 2019-10-02 PROCEDURE — 99214 PR OFFICE/OUTPT VISIT, EST, LEVL IV, 30-39 MIN: ICD-10-PCS | Mod: S$PBB,,, | Performed by: FAMILY MEDICINE

## 2019-10-02 PROCEDURE — 99213 OFFICE O/P EST LOW 20 MIN: CPT | Mod: PBBFAC,PN | Performed by: FAMILY MEDICINE

## 2019-10-02 PROCEDURE — 99999 PR PBB SHADOW E&M-EST. PATIENT-LVL III: ICD-10-PCS | Mod: PBBFAC,,, | Performed by: FAMILY MEDICINE

## 2019-10-02 RX ORDER — FLUTICASONE PROPIONATE 50 MCG
2 SPRAY, SUSPENSION (ML) NASAL DAILY
Qty: 16 G | Refills: 3 | Status: SHIPPED | OUTPATIENT
Start: 2019-10-02 | End: 2020-05-21

## 2019-10-02 NOTE — PROGRESS NOTES
THIS DOCUMENT WAS MADE IN PART WITH VOICE RECOGNITION SOFTWARE.  OCCASIONALLY THIS SOFTWARE WILL MISINTERPRET WORDS OR PHRASES.      Melvin Powers  3/17/1930    Melvin was seen today for hypertension.    Diagnoses and all orders for this visit:    Essential hypertension    Hypothyroidism, unspecified type    CKD (chronic kidney disease) stage 3, GFR 30-59 ml/min    Dyslipidemia    History of prostate cancer    Other orders  -     Influenza - High Dose (65+) (PF) (IM)        Subjective     Chief Complaint   Patient presents with    Hypertension     follow up       HPI  'only 2 problems today'  Hearing problem 'but I have hearing aids and don't wear the damn things'    Does report cough, and phlegm, several weeks  No SOB,         HPI elements addressed above in the assessment and plan including problems, diagnosis, stability/instability,  improving/worsening, and chronicity will not be duplicated in this section. Any important additional HPI topics will be discussed here if needed.    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   HENT: Positive for congestion. Negative for sinus pressure, trouble swallowing and voice change.    Eyes: Negative for visual disturbance.   Respiratory: Positive for cough. Negative for chest tightness, shortness of breath and wheezing.    Cardiovascular: Negative for chest pain, palpitations and leg swelling.   Gastrointestinal: Negative for abdominal pain, blood in stool, constipation, diarrhea, nausea and vomiting.   Genitourinary: Negative for dysuria, frequency and hematuria.   Musculoskeletal: Positive for arthralgias and back pain. Negative for neck pain.   Skin: Negative for rash and wound.   Neurological: Negative for syncope, weakness and light-headedness.   Psychiatric/Behavioral: Negative.  Negative for dysphoric mood and suicidal ideas.       Objective     Physical Exam   Constitutional: He is oriented to person, place, and time. He appears well-developed and well-nourished. No distress.   HENT:   Head: Normocephalic and atraumatic.   Right Ear: Tympanic membrane, external ear and ear canal normal.   Left Ear: Tympanic membrane, external ear and ear canal normal.   Nose: Mucosal edema present.   Mouth/Throat: Oropharynx is clear and moist.   Eyes: Conjunctivae are normal. Right eye exhibits no discharge. Left eye exhibits no discharge. No scleral icterus.   Neck: Normal range of motion. Neck supple. No JVD present.   Cardiovascular: Normal rate, regular rhythm and normal heart sounds. Exam reveals no gallop.   No murmur heard.  Mild foot and ankle edema   Pulmonary/Chest: Effort normal and breath sounds normal. No stridor. No respiratory distress. He has no wheezes. He has no rales.   Neurological: He is alert and oriented to person, place, and time. He has normal reflexes.   Ambulatory, no devices   Skin: Skin is warm and dry. He is not diaphoretic.   Psychiatric: He has a normal mood and affect. His behavior is normal.   Vitals reviewed.    Vitals:    10/02/19 1408   BP:  "126/68   BP Location: Left arm   Patient Position: Sitting   BP Method: Medium (Manual)   Pulse: 80   Resp: 16   Temp: 97.8 °F (36.6 °C)   TempSrc: Oral   Weight: 76.5 kg (168 lb 10.4 oz)   Height: 5' 8" (1.727 m)       MOST RECENT LABS IN OUR ELECTRONIC MEDICAL RECORD:     Results for orders placed or performed in visit on 09/25/19   Comprehensive metabolic panel   Result Value Ref Range    Sodium 139 136 - 145 mmol/L    Potassium 4.3 3.5 - 5.1 mmol/L    Chloride 102 95 - 110 mmol/L    CO2 28 23 - 29 mmol/L    Glucose 108 70 - 110 mg/dL    BUN, Bld 21 8 - 23 mg/dL    Creatinine 1.2 0.5 - 1.4 mg/dL    Calcium 10.0 8.7 - 10.5 mg/dL    Total Protein 7.6 6.0 - 8.4 g/dL    Albumin 4.3 3.5 - 5.2 g/dL    Total Bilirubin 0.9 0.1 - 1.0 mg/dL    Alkaline Phosphatase 71 55 - 135 U/L    AST 25 10 - 40 U/L    ALT 26 10 - 44 U/L    Anion Gap 9 8 - 16 mmol/L    eGFR if African American >60.0 >60 mL/min/1.73 m^2    eGFR if non  53.3 (A) >60 mL/min/1.73 m^2   TSH   Result Value Ref Range    TSH 2.313 0.400 - 4.000 uIU/mL   Lipid panel   Result Value Ref Range    Cholesterol 169 120 - 199 mg/dL    Triglycerides 94 30 - 150 mg/dL    HDL 51 40 - 75 mg/dL    LDL Cholesterol 99.2 63.0 - 159.0 mg/dL    Hdl/Cholesterol Ratio 30.2 20.0 - 50.0 %    Total Cholesterol/HDL Ratio 3.3 2.0 - 5.0    Non-HDL Cholesterol 118 mg/dL         "

## 2020-02-24 ENCOUNTER — LAB VISIT (OUTPATIENT)
Dept: LAB | Facility: HOSPITAL | Age: 85
End: 2020-02-24
Attending: FAMILY MEDICINE
Payer: MEDICARE

## 2020-02-24 DIAGNOSIS — N18.30 CKD (CHRONIC KIDNEY DISEASE) STAGE 3, GFR 30-59 ML/MIN: ICD-10-CM

## 2020-02-24 DIAGNOSIS — E78.5 DYSLIPIDEMIA: ICD-10-CM

## 2020-02-24 DIAGNOSIS — I10 ESSENTIAL HYPERTENSION: ICD-10-CM

## 2020-02-24 DIAGNOSIS — E03.9 HYPOTHYROIDISM, UNSPECIFIED TYPE: ICD-10-CM

## 2020-02-24 LAB
ALBUMIN SERPL BCP-MCNC: 4.2 G/DL (ref 3.5–5.2)
ALP SERPL-CCNC: 65 U/L (ref 55–135)
ALT SERPL W/O P-5'-P-CCNC: 29 U/L (ref 10–44)
ANION GAP SERPL CALC-SCNC: 11 MMOL/L (ref 8–16)
AST SERPL-CCNC: 31 U/L (ref 10–40)
BILIRUB SERPL-MCNC: 0.7 MG/DL (ref 0.1–1)
BUN SERPL-MCNC: 15 MG/DL (ref 8–23)
CALCIUM SERPL-MCNC: 9.6 MG/DL (ref 8.7–10.5)
CHLORIDE SERPL-SCNC: 104 MMOL/L (ref 95–110)
CHOLEST SERPL-MCNC: 149 MG/DL (ref 120–199)
CHOLEST/HDLC SERPL: 3.2 {RATIO} (ref 2–5)
CO2 SERPL-SCNC: 27 MMOL/L (ref 23–29)
CREAT SERPL-MCNC: 1.2 MG/DL (ref 0.5–1.4)
EST. GFR  (AFRICAN AMERICAN): >60 ML/MIN/1.73 M^2
EST. GFR  (NON AFRICAN AMERICAN): 53.3 ML/MIN/1.73 M^2
GLUCOSE SERPL-MCNC: 99 MG/DL (ref 70–110)
HDLC SERPL-MCNC: 46 MG/DL (ref 40–75)
HDLC SERPL: 30.9 % (ref 20–50)
LDLC SERPL CALC-MCNC: 76.6 MG/DL (ref 63–159)
NONHDLC SERPL-MCNC: 103 MG/DL
POTASSIUM SERPL-SCNC: 4.7 MMOL/L (ref 3.5–5.1)
PROT SERPL-MCNC: 7.3 G/DL (ref 6–8.4)
SODIUM SERPL-SCNC: 142 MMOL/L (ref 136–145)
TRIGL SERPL-MCNC: 132 MG/DL (ref 30–150)
TSH SERPL DL<=0.005 MIU/L-ACNC: 2.42 UIU/ML (ref 0.4–4)

## 2020-02-24 PROCEDURE — 80053 COMPREHEN METABOLIC PANEL: CPT

## 2020-02-24 PROCEDURE — 80061 LIPID PANEL: CPT

## 2020-02-24 PROCEDURE — 36415 COLL VENOUS BLD VENIPUNCTURE: CPT | Mod: PO

## 2020-02-24 PROCEDURE — 84443 ASSAY THYROID STIM HORMONE: CPT

## 2020-03-02 ENCOUNTER — OFFICE VISIT (OUTPATIENT)
Dept: FAMILY MEDICINE | Facility: CLINIC | Age: 85
End: 2020-03-02
Payer: MEDICARE

## 2020-03-02 ENCOUNTER — LAB VISIT (OUTPATIENT)
Dept: LAB | Facility: HOSPITAL | Age: 85
End: 2020-03-02
Attending: FAMILY MEDICINE
Payer: MEDICARE

## 2020-03-02 VITALS
HEIGHT: 68 IN | TEMPERATURE: 98 F | SYSTOLIC BLOOD PRESSURE: 138 MMHG | DIASTOLIC BLOOD PRESSURE: 70 MMHG | HEART RATE: 72 BPM | WEIGHT: 173.19 LBS | BODY MASS INDEX: 26.25 KG/M2 | RESPIRATION RATE: 18 BRPM

## 2020-03-02 DIAGNOSIS — E78.5 DYSLIPIDEMIA: ICD-10-CM

## 2020-03-02 DIAGNOSIS — N18.30 CKD (CHRONIC KIDNEY DISEASE) STAGE 3, GFR 30-59 ML/MIN: ICD-10-CM

## 2020-03-02 DIAGNOSIS — M65.332 TRIGGER MIDDLE FINGER OF LEFT HAND: ICD-10-CM

## 2020-03-02 DIAGNOSIS — R10.13 EPIGASTRIC PAIN: ICD-10-CM

## 2020-03-02 DIAGNOSIS — K92.1 BLOOD IN STOOL: ICD-10-CM

## 2020-03-02 DIAGNOSIS — R60.9 EDEMA, UNSPECIFIED TYPE: ICD-10-CM

## 2020-03-02 DIAGNOSIS — Z85.46 HISTORY OF PROSTATE CANCER: ICD-10-CM

## 2020-03-02 DIAGNOSIS — E03.9 HYPOTHYROIDISM, UNSPECIFIED TYPE: ICD-10-CM

## 2020-03-02 DIAGNOSIS — I10 ESSENTIAL HYPERTENSION: Primary | ICD-10-CM

## 2020-03-02 LAB
BASOPHILS # BLD AUTO: 0.03 K/UL (ref 0–0.2)
BASOPHILS NFR BLD: 0.5 % (ref 0–1.9)
DIFFERENTIAL METHOD: NORMAL
EOSINOPHIL # BLD AUTO: 0.2 K/UL (ref 0–0.5)
EOSINOPHIL NFR BLD: 3.2 % (ref 0–8)
ERYTHROCYTE [DISTWIDTH] IN BLOOD BY AUTOMATED COUNT: 13.8 % (ref 11.5–14.5)
HCT VFR BLD AUTO: 46 % (ref 40–54)
HGB BLD-MCNC: 14.8 G/DL (ref 14–18)
IMM GRANULOCYTES # BLD AUTO: 0.01 K/UL (ref 0–0.04)
IMM GRANULOCYTES NFR BLD AUTO: 0.2 % (ref 0–0.5)
LYMPHOCYTES # BLD AUTO: 1.4 K/UL (ref 1–4.8)
LYMPHOCYTES NFR BLD: 23 % (ref 18–48)
MCH RBC QN AUTO: 30.8 PG (ref 27–31)
MCHC RBC AUTO-ENTMCNC: 32.2 G/DL (ref 32–36)
MCV RBC AUTO: 96 FL (ref 82–98)
MONOCYTES # BLD AUTO: 0.6 K/UL (ref 0.3–1)
MONOCYTES NFR BLD: 9.9 % (ref 4–15)
NEUTROPHILS # BLD AUTO: 3.8 K/UL (ref 1.8–7.7)
NEUTROPHILS NFR BLD: 63.2 % (ref 38–73)
NRBC BLD-RTO: 0 /100 WBC
PLATELET # BLD AUTO: 208 K/UL (ref 150–350)
PMV BLD AUTO: 9.2 FL (ref 9.2–12.9)
RBC # BLD AUTO: 4.8 M/UL (ref 4.6–6.2)
WBC # BLD AUTO: 5.95 K/UL (ref 3.9–12.7)

## 2020-03-02 PROCEDURE — 1159F MED LIST DOCD IN RCRD: CPT | Mod: S$GLB,,, | Performed by: FAMILY MEDICINE

## 2020-03-02 PROCEDURE — 36415 COLL VENOUS BLD VENIPUNCTURE: CPT | Mod: PN

## 2020-03-02 PROCEDURE — 1126F AMNT PAIN NOTED NONE PRSNT: CPT | Mod: S$GLB,,, | Performed by: FAMILY MEDICINE

## 2020-03-02 PROCEDURE — 1101F PR PT FALLS ASSESS DOC 0-1 FALLS W/OUT INJ PAST YR: ICD-10-PCS | Mod: CPTII,S$GLB,, | Performed by: FAMILY MEDICINE

## 2020-03-02 PROCEDURE — 99215 PR OFFICE/OUTPT VISIT, EST, LEVL V, 40-54 MIN: ICD-10-PCS | Mod: S$GLB,,, | Performed by: FAMILY MEDICINE

## 2020-03-02 PROCEDURE — 85025 COMPLETE CBC W/AUTO DIFF WBC: CPT

## 2020-03-02 PROCEDURE — 99215 OFFICE O/P EST HI 40 MIN: CPT | Mod: S$GLB,,, | Performed by: FAMILY MEDICINE

## 2020-03-02 PROCEDURE — 99999 PR PBB SHADOW E&M-EST. PATIENT-LVL IV: ICD-10-PCS | Mod: PBBFAC,,, | Performed by: FAMILY MEDICINE

## 2020-03-02 PROCEDURE — 99999 PR PBB SHADOW E&M-EST. PATIENT-LVL IV: CPT | Mod: PBBFAC,,, | Performed by: FAMILY MEDICINE

## 2020-03-02 PROCEDURE — 1101F PT FALLS ASSESS-DOCD LE1/YR: CPT | Mod: CPTII,S$GLB,, | Performed by: FAMILY MEDICINE

## 2020-03-02 PROCEDURE — 1159F PR MEDICATION LIST DOCUMENTED IN MEDICAL RECORD: ICD-10-PCS | Mod: S$GLB,,, | Performed by: FAMILY MEDICINE

## 2020-03-02 PROCEDURE — 1126F PR PAIN SEVERITY QUANTIFIED, NO PAIN PRESENT: ICD-10-PCS | Mod: S$GLB,,, | Performed by: FAMILY MEDICINE

## 2020-03-02 NOTE — PROGRESS NOTES
THIS DOCUMENT WAS MADE IN PART WITH VOICE RECOGNITION SOFTWARE.  OCCASIONALLY THIS SOFTWARE WILL MISINTERPRET WORDS OR PHRASES.      Melvin HALEY Priya  3/17/1930    Melvin was seen today for follow-up.    Diagnoses and all orders for this visit:    Essential hypertension  This is a chronic condition.  This condition has been reviewed and evaluated and it is currently stable.    Hypothyroidism, unspecified type  This is a chronic condition.  This condition has been reviewed and evaluated and it is currently stable.    CKD (chronic kidney disease) stage 3, GFR 30-59 ml/min  Stable    Dyslipidemia  Mildly improved, continue current medications    History of prostate cancer  Reviewed history, stable at this point.    Edema, unspecified type  Improved, there is trace ankle edema but he is managing well.    Trigger middle finger of left hand  -     Ambulatory referral/consult to Orthopedics; Future  He reports fairly severe pain when the finger locks so I fitted him with a temporary finger splint dental hopefully avoid locking up.  He can wear this as needed and particularly at nighttime until he sees Orthopedics    Epigastric pain  -     CBC auto differential; Future  -     US Abdomen Complete; Future  -     Occult blood x 1, stool; Future  -     Occult blood x 1, stool; Future  -     Occult blood x 1, stool; Future  He should take the Nexium every day.  Confirmed he is not on any NSAIDs.  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  Either way a bleeding returns he should let us know and discontinue Plavix.  Note that the stool samples could easily be positive because of his radiation colitis but nonetheless I feel we should still check this    Blood in stool  -     CBC auto differential; Future  -     Occult blood x 1, stool; Future  -     Occult blood x 1,  stool; Future  -     Occult blood x 1, stool; Future  As above    Total time greater than 45 min, including chart review, lab reviewed, med reconcilliation., more than 50% face-to-face counseling on a variety of topics      Subjective     Chief Complaint   Patient presents with    Follow-up     hypertension        HPI    Essential hypertension  This is a chronic condition.  This condition has been reviewed and evaluated and it is currently stable.    Hypothyroidism, unspecified type  TSH is stable, condition is stable    CKD (chronic kidney disease) stage 3, GFR 30-59 ml/min  Stable, no uremic symptoms    Dyslipidemia  Mild improvement    History of prostate cancer  Stable    Edema, unspecified type     His wife mentioned (without him present) that he has been having hand pain, he states left middle finger trigger finger, very painful, had injection for 2nd finger, her reports several months    Epigastric pain, off and on, eating helps, on nexium 'but not regularly, only when I got pain  '  One episode of small red blood, no melena  On plavix, by Dr. 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    Respiratory: Negative for shortness of breath.    Cardiovascular: Negative for chest pain.   Gastrointestinal: Positive for abdominal pain, blood in stool and constipation. Negative for abdominal distention, diarrhea, nausea and vomiting.   Genitourinary: Negative.    Musculoskeletal: Positive for arthralgias and joint swelling.   Skin: Negative for rash and wound.   Psychiatric/Behavioral: Positive for decreased concentration.       Objective     Physical Exam   Constitutional: He is oriented to person, place, and time. He appears well-developed and well-nourished. No distress.   HENT:   Head: Normocephalic and atraumatic.   Right Ear: Tympanic membrane and ear canal normal.   Left Ear: Tympanic membrane and ear canal normal.   Mouth/Throat: No oropharyngeal exudate.   Eyes: Conjunctivae are normal. Right eye exhibits no discharge. Left eye exhibits no discharge. No scleral icterus.   Neck: Normal range of motion. Neck supple. No JVD present.   Cardiovascular: Normal rate, regular rhythm and normal heart sounds. Exam reveals no gallop.   No murmur heard.  Mild foot and ankle edema   Pulmonary/Chest: Effort normal and breath sounds normal. No stridor. No respiratory distress. He has no wheezes. He has no rales.   Abdominal:   Abdomen is soft.  Bowel sounds are present no somewhat hypoactive.  There is mild tenderness left epigastric left upper.  No guarding or rebound.  No obvious mass no bruits although exam is limited by abdominal obesity   Musculoskeletal:   His left middle finger does lock and appears to be severely painful when straightening, briefly   Neurological: He is alert and oriented to person, place, and time. He has normal reflexes.   Ambulatory, no devices   Skin: Skin is warm and dry. He is not diaphoretic.   Psychiatric: He has a normal mood and affect. His behavior is normal.   Vitals reviewed.    Vitals:  "   03/02/20 1342   BP: 138/70   BP Location: Left arm   Patient Position: Sitting   BP Method: Medium (Manual)   Pulse: 72   Resp: 18   Temp: 97.9 °F (36.6 °C)   TempSrc: Oral   Weight: 78.5 kg (173 lb 2.7 oz)   Height: 5' 8" (1.727 m)       MOST RECENT LABS IN OUR ELECTRONIC MEDICAL RECORD:     Results for orders placed or performed in visit on 02/24/20   Lipid panel   Result Value Ref Range    Cholesterol 149 120 - 199 mg/dL    Triglycerides 132 30 - 150 mg/dL    HDL 46 40 - 75 mg/dL    LDL Cholesterol 76.6 63.0 - 159.0 mg/dL    Hdl/Cholesterol Ratio 30.9 20.0 - 50.0 %    Total Cholesterol/HDL Ratio 3.2 2.0 - 5.0    Non-HDL Cholesterol 103 mg/dL   Comprehensive metabolic panel   Result Value Ref Range    Sodium 142 136 - 145 mmol/L    Potassium 4.7 3.5 - 5.1 mmol/L    Chloride 104 95 - 110 mmol/L    CO2 27 23 - 29 mmol/L    Glucose 99 70 - 110 mg/dL    BUN, Bld 15 8 - 23 mg/dL    Creatinine 1.2 0.5 - 1.4 mg/dL    Calcium 9.6 8.7 - 10.5 mg/dL    Total Protein 7.3 6.0 - 8.4 g/dL    Albumin 4.2 3.5 - 5.2 g/dL    Total Bilirubin 0.7 0.1 - 1.0 mg/dL    Alkaline Phosphatase 65 55 - 135 U/L    AST 31 10 - 40 U/L    ALT 29 10 - 44 U/L    Anion Gap 11 8 - 16 mmol/L    eGFR if African American >60.0 >60 mL/min/1.73 m^2    eGFR if non  53.3 (A) >60 mL/min/1.73 m^2   TSH   Result Value Ref Range    TSH 2.420 0.400 - 4.000 uIU/mL         "

## 2020-03-09 ENCOUNTER — LAB VISIT (OUTPATIENT)
Dept: LAB | Facility: HOSPITAL | Age: 85
End: 2020-03-09
Attending: FAMILY MEDICINE
Payer: MEDICARE

## 2020-03-09 DIAGNOSIS — K92.1 BLOOD IN STOOL: ICD-10-CM

## 2020-03-09 DIAGNOSIS — R10.13 EPIGASTRIC PAIN: ICD-10-CM

## 2020-03-09 PROCEDURE — 82272 OCCULT BLD FECES 1-3 TESTS: CPT | Mod: 91

## 2020-03-10 ENCOUNTER — TELEPHONE (OUTPATIENT)
Dept: FAMILY MEDICINE | Facility: CLINIC | Age: 85
End: 2020-03-10

## 2020-03-10 DIAGNOSIS — R19.5 HEME POSITIVE STOOL: ICD-10-CM

## 2020-03-10 DIAGNOSIS — R10.13 EPIGASTRIC PAIN: Primary | ICD-10-CM

## 2020-03-10 LAB
OB PNL STL: POSITIVE

## 2020-03-10 NOTE — TELEPHONE ENCOUNTER
Spoke w/ pt. Advised as recommended. Pt agreed and states he has a GI appt on Friday.    Pt states the Nexium constipates him and he is wondering if it would be OK for him to take his Linzess while he is taking the Nexium. Please review and advise.

## 2020-03-10 NOTE — TELEPHONE ENCOUNTER
Please call with test results.  His stool test are positive for blood.  Make sure he remains on Nexium.  I need to repeat a CBC and iron levels and also need to get him into Gastroenterology as soon as possible.  Please verify he is feeling well if not.

## 2020-03-10 NOTE — TELEPHONE ENCOUNTER
I would defer the Linzess to gastroenterology but there is not a direct contraindication between the 2 that I am aware of

## 2020-03-11 ENCOUNTER — PATIENT OUTREACH (OUTPATIENT)
Dept: ADMINISTRATIVE | Facility: OTHER | Age: 85
End: 2020-03-11

## 2020-03-13 ENCOUNTER — HOSPITAL ENCOUNTER (OUTPATIENT)
Dept: RADIOLOGY | Facility: HOSPITAL | Age: 85
Discharge: HOME | End: 2020-03-13
Attending: FAMILY MEDICINE
Payer: MEDICARE

## 2020-03-13 ENCOUNTER — OFFICE VISIT (OUTPATIENT)
Dept: ORTHOPEDICS | Facility: CLINIC | Age: 85
End: 2020-03-13
Payer: MEDICARE

## 2020-03-13 VITALS — HEIGHT: 68 IN | BODY MASS INDEX: 26.22 KG/M2 | WEIGHT: 173 LBS | TEMPERATURE: 98 F

## 2020-03-13 DIAGNOSIS — N18.30 CKD (CHRONIC KIDNEY DISEASE) STAGE 3, GFR 30-59 ML/MIN: Chronic | ICD-10-CM

## 2020-03-13 DIAGNOSIS — M79.641 PAIN IN BOTH HANDS: ICD-10-CM

## 2020-03-13 DIAGNOSIS — R10.13 EPIGASTRIC PAIN: ICD-10-CM

## 2020-03-13 DIAGNOSIS — M65.341 TRIGGER FINGER, RIGHT RING FINGER: Primary | ICD-10-CM

## 2020-03-13 DIAGNOSIS — M79.642 PAIN IN BOTH HANDS: ICD-10-CM

## 2020-03-13 DIAGNOSIS — M65.332 TRIGGER MIDDLE FINGER OF LEFT HAND: ICD-10-CM

## 2020-03-13 PROCEDURE — 99999 PR PBB SHADOW E&M-EST. PATIENT-LVL III: ICD-10-PCS | Mod: PBBFAC,,, | Performed by: ORTHOPAEDIC SURGERY

## 2020-03-13 PROCEDURE — 1125F AMNT PAIN NOTED PAIN PRSNT: CPT | Mod: S$GLB,,, | Performed by: ORTHOPAEDIC SURGERY

## 2020-03-13 PROCEDURE — 99214 OFFICE O/P EST MOD 30 MIN: CPT | Mod: 25,S$GLB,, | Performed by: ORTHOPAEDIC SURGERY

## 2020-03-13 PROCEDURE — 1101F PT FALLS ASSESS-DOCD LE1/YR: CPT | Mod: CPTII,S$GLB,, | Performed by: ORTHOPAEDIC SURGERY

## 2020-03-13 PROCEDURE — 99999 PR PBB SHADOW E&M-EST. PATIENT-LVL III: CPT | Mod: PBBFAC,,, | Performed by: ORTHOPAEDIC SURGERY

## 2020-03-13 PROCEDURE — 1125F PR PAIN SEVERITY QUANTIFIED, PAIN PRESENT: ICD-10-PCS | Mod: S$GLB,,, | Performed by: ORTHOPAEDIC SURGERY

## 2020-03-13 PROCEDURE — 1159F MED LIST DOCD IN RCRD: CPT | Mod: S$GLB,,, | Performed by: ORTHOPAEDIC SURGERY

## 2020-03-13 PROCEDURE — 1101F PR PT FALLS ASSESS DOC 0-1 FALLS W/OUT INJ PAST YR: ICD-10-PCS | Mod: CPTII,S$GLB,, | Performed by: ORTHOPAEDIC SURGERY

## 2020-03-13 PROCEDURE — 76700 US EXAM ABDOM COMPLETE: CPT | Mod: TC,PO

## 2020-03-13 PROCEDURE — 99214 PR OFFICE/OUTPT VISIT, EST, LEVL IV, 30-39 MIN: ICD-10-PCS | Mod: 25,S$GLB,, | Performed by: ORTHOPAEDIC SURGERY

## 2020-03-13 PROCEDURE — 20550 TENDON SHEATH: R RING MCP: ICD-10-PCS | Mod: 59,51,F8,S$GLB | Performed by: ORTHOPAEDIC SURGERY

## 2020-03-13 PROCEDURE — 20550 NJX 1 TENDON SHEATH/LIGAMENT: CPT | Mod: F2,S$GLB,, | Performed by: ORTHOPAEDIC SURGERY

## 2020-03-13 PROCEDURE — 1159F PR MEDICATION LIST DOCUMENTED IN MEDICAL RECORD: ICD-10-PCS | Mod: S$GLB,,, | Performed by: ORTHOPAEDIC SURGERY

## 2020-03-13 PROCEDURE — 76700 US ABDOMEN COMPLETE: ICD-10-PCS | Mod: 26,,, | Performed by: RADIOLOGY

## 2020-03-13 PROCEDURE — 76700 US EXAM ABDOM COMPLETE: CPT | Mod: 26,,, | Performed by: RADIOLOGY

## 2020-03-13 PROCEDURE — 20550 NJX 1 TENDON SHEATH/LIGAMENT: CPT | Mod: 59,51,F8,S$GLB | Performed by: ORTHOPAEDIC SURGERY

## 2020-03-13 RX ORDER — TRIAMCINOLONE ACETONIDE 40 MG/ML
40 INJECTION, SUSPENSION INTRA-ARTICULAR; INTRAMUSCULAR
Status: DISCONTINUED | OUTPATIENT
Start: 2020-03-13 | End: 2020-03-13 | Stop reason: HOSPADM

## 2020-03-13 RX ADMIN — TRIAMCINOLONE ACETONIDE 40 MG: 40 INJECTION, SUSPENSION INTRA-ARTICULAR; INTRAMUSCULAR at 01:03

## 2020-03-13 NOTE — PROCEDURES
Tendon Sheath: R ring MCP  Date/Time: 3/13/2020 1:15 PM  Performed by: Brijesh Chau MD  Authorized by: Brijesh Chau MD     Location:  Ring finger  Site:  R ring MCP  Medications:  40 mg triamcinolone acetonide 40 mg/mL  Patient tolerance:  Patient tolerated the procedure well with no immediate complications

## 2020-03-13 NOTE — LETTER
March 13, 2020      Mitch Rebolledo MD  [address]  [facility] [address]           [facility]  [address] [place] [address]  Phone: [phone]          Patient: Melvin Powers   MR Number: [id number]   YOB: 1930   Date of Visit: 3/13/2020       Dear Dr. Mitch Rebolledo:    Thank you for referring Melvin Powers to me for evaluation. Attached you will find relevant portions of my assessment and plan of care.    If you have questions, please do not hesitate to call me. I look forward to following Melvin Powers along with you.    Sincerely,    Brijesh Chau MD    Enclosure  CC:  No Recipients    If you would like to receive this communication electronically, please contact [email or web address] or [phone] to request more information on [employer] access.    For providers and/or their staff who would like to refer a patient to [facility], please contact us through our one-stop-shop provider referral line, [facility], at [phone].    If you feel you have received this communication in error or would no longer like to receive these types of communications, please e-mail [email or web address]

## 2020-03-13 NOTE — PROGRESS NOTES
89 years old with walking getting the left middle finger is right ring finger.  We had given him injections past good temporary relief.  He is requesting injection today    Exam shows that he does indeed have an elicitable to the left middle finger and right ring finger without signs of infection or instability    X-rays showed degenerative changes    Assessment:  Left middle trigger finger 2.  Right ring trigger finger    Plan:  Kenalog injection left middle finger 2.  Kenalog injection right ring finger    Further History  Aching pain  Worse with activity  Relieved with rest  No other associated symptoms  No other radiation    Further Exam  Alert and oriented  Pleasant  Contralateral limb has appropriate range of motion for age and condition  Contralateral limb has appropriate strength for age and condition  Contralateral limb has appropriate stability  for age and condition  No adenopathy  Pulses are appropriate for current condition  Skin is intact        Chief Complaint    Chief Complaint   Patient presents with    Right Hand - Pain    Left Hand - Pain       HPI  Melvin Powers is a 89 y.o.  male who presents with       Past Medical History  Past Medical History:   Diagnosis Date    Cataract     CKD (chronic kidney disease) stage 3, GFR 30-59 ml/min 3/16/2014    Coronary artery disease     GERD (gastroesophageal reflux disease)     Irritable bowel syndrome with constipation 11/30/2016       Past Surgical History  Past Surgical History:   Procedure Laterality Date    APPENDECTOMY      COLONOSCOPY      CYSTOSCOPY      pterygium removal Left     UPPER GASTROINTESTINAL ENDOSCOPY         Medications  Current Outpatient Medications   Medication Sig    cholecalciferol, vitamin D3, (VITAMIN D3) 1,000 unit capsule Vitamin D3    clopidogrel (PLAVIX) 75 mg tablet TAKE 1 TABLET DAILY    clotrimazole-betamethasone (LOTRISONE) lotion Apply topically 2 (two) times daily. Apply twice daily    coenzyme Q10  "(COQ-10) 100 mg capsule Take 100 mg by mouth 2 (two) times daily.    diclofenac sodium (VOLTAREN) 1 % Gel Apply bid to tid to hands as needed for pain (Patient not taking: Reported on 3/2/2020)    esomeprazole (NEXIUM) 40 MG capsule Nexium 40 mg capsule,delayed release   Take 1 capsule every day by oral route as needed.    fluticasone propionate (FLONASE) 50 mcg/actuation nasal spray 2 sprays (100 mcg total) by Each Nostril route once daily.    furosemide (LASIX) 20 MG tablet Take 20 mg by mouth 2 (two) times daily.    lactulose (KRISTALOSE) 10 gram packet One packet tid    levothyroxine (SYNTHROID) 75 MCG tablet Take 1 tablet (75 mcg total) by mouth once daily.    magnesium gluconate 27.5 mg (500 mg) Tab Take 500 mg by mouth 2 (two) times daily.    meclizine (ANTIVERT) 25 mg tablet Take 1 tablet (25 mg total) by mouth 3 (three) times daily as needed. (Patient not taking: Reported on 3/2/2020)    metoprolol succinate (TOPROL-XL) 25 MG 24 hr tablet TAKE 1 TABLET DAILY (Patient not taking: Reported on 10/2/2019)    multivitamin (THERAGRAN) per tablet Take 1 tablet by mouth once daily.    nitroGLYCERIN 0.1 mg/hr TD PT24 (NITRODUR) 0.1 mg/hr PT24 PLACE 1 PATCH ONTO THE SKIN ONCE D    rosuvastatin (CRESTOR) 40 MG Tab Take 1 tablet (40 mg total) by mouth once daily.    vitamin E 400 UNIT capsule vitamin E 400 unit capsule   Take 1 capsule every day by oral route.     No current facility-administered medications for this visit.        Allergies  Review of patient's allergies indicates:   Allergen Reactions    Atorvastatin Other (See Comments)     Other reaction(s): Muscle pain    Codeine      Other reaction(s): makes him"goofey"    Contrast media     Iodinated contrast media      Other reaction(s): Rash       Family History  Family History   Problem Relation Age of Onset    Rheum arthritis Mother     Cancer Father     Rheum arthritis Father     Cancer Sister         bone    Cancer Brother  "       Social History  Social History     Socioeconomic History    Marital status:      Spouse name: Not on file    Number of children: Not on file    Years of education: Not on file    Highest education level: Not on file   Occupational History    Not on file   Social Needs    Financial resource strain: Not on file    Food insecurity:     Worry: Not on file     Inability: Not on file    Transportation needs:     Medical: Not on file     Non-medical: Not on file   Tobacco Use    Smoking status: Never Smoker    Smokeless tobacco: Never Used   Substance and Sexual Activity    Alcohol use: Yes     Alcohol/week: 2.0 standard drinks     Types: 1 Glasses of wine, 1 Cans of beer per week     Drinks per session: 1 or 2    Drug use: No    Sexual activity: Not on file   Lifestyle    Physical activity:     Days per week: Not on file     Minutes per session: Not on file    Stress: Not on file   Relationships    Social connections:     Talks on phone: Not on file     Gets together: Not on file     Attends Buddhist service: Not on file     Active member of club or organization: Not on file     Attends meetings of clubs or organizations: Not on file     Relationship status: Not on file   Other Topics Concern    Not on file   Social History Narrative    Not on file               Review of Systems     Constitutional: Negative    HENT: Negative  Eyes: Negative  Respiratory: Negative  Cardiovascular: Negative  Musculoskeletal: HPI  Skin: Negative  Neurological: Negative  Hematological: Negative  Endocrine: Negative                 Physical Exam    Vitals:    03/13/20 1258   Temp: 98 °F (36.7 °C)     Body mass index is 26.3 kg/m².  Physical Examination:     General appearance -  well appearing, and in no distress  Mental status - awake  Neck - supple  Chest -  symmetric air entry  Heart - normal rate   Abdomen - soft      Assessment     1. Trigger finger, right ring finger    2. Trigger middle finger of left  hand    3. Pain in both hands    4. CKD (chronic kidney disease) stage 3, GFR 30-59 ml/min          Plan

## 2020-03-13 NOTE — PROCEDURES
Tendon Sheath: L long MCP  Date/Time: 3/13/2020 1:15 PM  Performed by: Brijesh Chau MD  Authorized by: Brijesh Chau MD     Location:  Long finger  Site:  L long MCP  Medications:  40 mg triamcinolone acetonide 40 mg/mL  Patient tolerance:  Patient tolerated the procedure well with no immediate complications

## 2020-03-20 ENCOUNTER — TELEPHONE (OUTPATIENT)
Dept: FAMILY MEDICINE | Facility: CLINIC | Age: 85
End: 2020-03-20

## 2020-03-20 NOTE — TELEPHONE ENCOUNTER
There are no immediate worrisome findings.  Liver, spleen, pancreas, gallbladder all normal.  Kidneys are normal.  The abdominal aorta is slightly larger than average.  Compared to last time.  There is no large aneurysm just a very mild widening.  Will probably repeat this in 1 year.

## 2020-03-20 NOTE — TELEPHONE ENCOUNTER
----- Message from Georgina Kc sent at 3/20/2020 10:26 AM CDT -----  Contact: PT  Type:  Test Results    Who Called:  Pt  Name of Test (Lab/Mammo/Etc):  ultrasound  Date of Test:  3/13/20  Ordering Provider:  lela  Where the test was performed:  [facility] Call Back Number: [phone]  Additional Information:  Pt requesting a call back in regards to test results        Please advise. Thank you

## 2020-03-30 ENCOUNTER — TELEPHONE (OUTPATIENT)
Dept: FAMILY MEDICINE | Facility: CLINIC | Age: 85
End: 2020-03-30

## 2020-03-30 NOTE — TELEPHONE ENCOUNTER
----- Message from Es Abraham sent at 3/28/2020 10:58 AM CDT -----  Contact: Melvin lester  Type: Needs Medical Advice    Who Called:  Melvin [facility] Call Back Number: [phone]  Additional Information: Pls call pt to adv if he can get his results mailed to him

## 2020-04-03 ENCOUNTER — TELEPHONE (OUTPATIENT)
Dept: FAMILY MEDICINE | Facility: CLINIC | Age: 85
End: 2020-04-03

## 2020-04-03 NOTE — TELEPHONE ENCOUNTER
----- Message from Nancy Gross sent at 4/3/2020  1:19 PM CDT -----  Type: Needs Medical Advice    Who Called:  Patient  Best Call Back Number: [phone]  Additional Information: Patient called back concerning request for test results/stated that he was told it would be mailed and he has not received/please call back to advise.

## 2020-04-03 NOTE — TELEPHONE ENCOUNTER
Pt informed mail from our office has to go to [facility] before it is sent to pt, it may take 7-10 days to reach him. He expressed understanding

## 2020-04-14 RX ORDER — ROSUVASTATIN CALCIUM 40 MG/1
TABLET, FILM COATED ORAL
Qty: 90 TABLET | Refills: 3 | Status: SHIPPED | OUTPATIENT
Start: 2020-04-14 | End: 2021-03-15 | Stop reason: SDUPTHER

## 2020-04-14 RX ORDER — LEVOTHYROXINE SODIUM 75 UG/1
TABLET ORAL
Qty: 90 TABLET | Refills: 3 | Status: SHIPPED | OUTPATIENT
Start: 2020-04-14 | End: 2021-03-15 | Stop reason: SDUPTHER

## 2020-05-06 RX ORDER — FUROSEMIDE 20 MG/1
20 TABLET ORAL 2 TIMES DAILY
Qty: 90 TABLET | Refills: 0 | Status: SHIPPED | OUTPATIENT
Start: 2020-05-06 | End: 2020-05-21

## 2020-06-10 RX ORDER — CLOPIDOGREL BISULFATE 75 MG/1
75 TABLET ORAL DAILY
Qty: 90 TABLET | Refills: 3 | Status: SHIPPED | OUTPATIENT
Start: 2020-06-10 | End: 2020-11-23

## 2020-06-10 NOTE — TELEPHONE ENCOUNTER
----- Message from Tamiko Napoles sent at 6/10/2020 11:38 AM CDT -----  Contact: Pt refill   Type:  RX Refill Request    Who Called: Patient   Refill or New Rx:  refill  RX Name and Strength:  clopidogrel (PLAVIX) 75 mg tablet  How is the patient currently taking it? (ex. 1XDay):  ..  Is this a 30 day or 90 day RX:  90 day   Preferred Pharmacy with phone number:    [facility] - [place], [place] - [address] AT [address] [facility]  [address]  [facility] [address]  Phone: [phone] Fax: [phone]  Local or Mail Order: Mail   Ordering Provider:  Amaury  Best Call Back Number:  [phone]  Additional Information:  Out of meds. Needs clopidogrel (PLAVIX) 75 mg tablet back due to other meds are not working for patient.

## 2020-06-13 ENCOUNTER — TELEPHONE (OUTPATIENT)
Dept: FAMILY MEDICINE | Facility: CLINIC | Age: 85
End: 2020-06-13

## 2020-06-13 RX ORDER — TRIAMCINOLONE ACETONIDE 0.25 MG/G
CREAM TOPICAL 2 TIMES DAILY
Qty: 60 G | Refills: 2 | Status: SHIPPED | OUTPATIENT
Start: 2020-06-13 | End: 2020-12-14

## 2020-06-13 NOTE — TELEPHONE ENCOUNTER
----- Message from Nikky Rebolledo sent at 6/13/2020  9:35 AM CDT -----  Regarding: needs new script for a rash  Type: Needs Medical Advice  Who Called: Munira  Symptoms (please be specific):    How long has patient had these symptoms:    Pharmacy name and phone #:    Best Call Back Number: [phone] (home)     Additional Information: pt wife Berna called in and stated pt has a rash all over his body. She would like for the provider to call in a script to her local pharmacy but she can not remember the name of the medication. Pls call to advise

## 2020-06-13 NOTE — TELEPHONE ENCOUNTER
Please investigate how this message was sent directly to a physcician, not the staff box and not the on-call physician on a Saturday with expectation left with the patient that it would be addressed    First of all, I am not certain why a patient message was sent directly to me on a Saturday when I am not on call and not working.  This likely left the patient expecting it would be addressed in a timely manner.  If something is urgent it should be sent to the on-call physician by telephone.  Non urgent messages should be sent to the nurses box/STAFF box and the patient should be advised will be addressed when clinic opens on Monday.  I will send in a cream but again if the patient needs needs urgent attention that cannot wait until Monday then the on-call physician should be contacted

## 2020-06-16 ENCOUNTER — TELEPHONE (OUTPATIENT)
Dept: FAMILY MEDICINE | Facility: CLINIC | Age: 85
End: 2020-06-16

## 2020-06-16 DIAGNOSIS — R21 RASH: Primary | ICD-10-CM

## 2020-06-16 RX ORDER — METHYLPREDNISOLONE 4 MG/1
TABLET ORAL
Qty: 1 PACKAGE | Refills: 0 | Status: SHIPPED | OUTPATIENT
Start: 2020-06-16 | End: 2020-11-23

## 2020-06-16 NOTE — TELEPHONE ENCOUNTER
----- Message from Sonya Lloyd sent at 6/15/2020  5:57 PM CDT -----  Type:  Patient Returning Call    Who Called: Melvin  Who Left Message for Patient:pt  Does the patient know what this is regarding?: missed call or he was disconnected   Would the patient rather a call back or a response via [facility]?  call  Best Call Back Number:[phone]  Additional Information:  call

## 2020-06-16 NOTE — TELEPHONE ENCOUNTER
"----- Message from Gabino Pickett sent at 6/16/2020  2:57 PM CDT -----  Regarding: New medication ointment  Type: Needs Medical Advice    Who Called:  josee Coronel [phone]    Pharmacy name and phone #:      [id number] - [place] - [address]  [address]  [place] [address]  Phone: [phone] Fax: [phone]    Best Call Back Number: josee Coronel [phone]      Additional Information:     Advised would like a "dose pack" for treatment of his condition. He can't use the ointment around his eyes.      Medication: clotrimazole-betamethasone (LOTRISONE) lotion 90 mL 3 7/26/2019  No  Sig - Route: Apply topically 2 (two) times daily. Apply twice daily - Topical (Top)  Sent to pharmacy as: clotrimazole-betamethasone (LOTRISONE) lotion  Class: Normal    Please send dose pack to pharmacy. Please call.    "

## 2020-06-16 NOTE — TELEPHONE ENCOUNTER
What am I treating, what is the condition?  I have not seen him.  I was asked to write prescription for a cream over the weekend but there is still no documentation as to what the problem is.

## 2020-06-16 NOTE — TELEPHONE ENCOUNTER
Spoke with pt, he states his rash looks almost like a heat rash. He believes he got it from his garden, he has been planting zucchini. It started on his arms but is now all over his face. He states he has had it before and it was treated with medrol dosepack. Describes as small, red, raised bumps that itch.  He has not taken anything otc.

## 2020-07-02 ENCOUNTER — LAB VISIT (OUTPATIENT)
Dept: LAB | Facility: HOSPITAL | Age: 85
End: 2020-07-02
Attending: FAMILY MEDICINE
Payer: MEDICARE

## 2020-07-02 ENCOUNTER — OFFICE VISIT (OUTPATIENT)
Dept: FAMILY MEDICINE | Facility: CLINIC | Age: 85
End: 2020-07-02
Payer: MEDICARE

## 2020-07-02 VITALS
DIASTOLIC BLOOD PRESSURE: 68 MMHG | HEART RATE: 82 BPM | HEIGHT: 68 IN | SYSTOLIC BLOOD PRESSURE: 138 MMHG | OXYGEN SATURATION: 95 % | TEMPERATURE: 98 F | BODY MASS INDEX: 25.53 KG/M2 | WEIGHT: 168.44 LBS

## 2020-07-02 DIAGNOSIS — I10 ESSENTIAL HYPERTENSION: Primary | ICD-10-CM

## 2020-07-02 DIAGNOSIS — E03.9 HYPOTHYROIDISM, UNSPECIFIED TYPE: ICD-10-CM

## 2020-07-02 DIAGNOSIS — N18.30 CKD (CHRONIC KIDNEY DISEASE) STAGE 3, GFR 30-59 ML/MIN: ICD-10-CM

## 2020-07-02 DIAGNOSIS — I77.811 AORTIC ECTASIA, ABDOMINAL: ICD-10-CM

## 2020-07-02 DIAGNOSIS — I70.0 AORTIC ATHEROSCLEROSIS: ICD-10-CM

## 2020-07-02 DIAGNOSIS — E78.5 DYSLIPIDEMIA: ICD-10-CM

## 2020-07-02 DIAGNOSIS — R82.90 FOUL SMELLING URINE: ICD-10-CM

## 2020-07-02 DIAGNOSIS — R19.5 HEME POSITIVE STOOL: ICD-10-CM

## 2020-07-02 DIAGNOSIS — Z85.46 HISTORY OF PROSTATE CANCER: ICD-10-CM

## 2020-07-02 PROCEDURE — 1101F PR PT FALLS ASSESS DOC 0-1 FALLS W/OUT INJ PAST YR: ICD-10-PCS | Mod: CPTII,S$GLB,, | Performed by: FAMILY MEDICINE

## 2020-07-02 PROCEDURE — 99999 PR PBB SHADOW E&M-EST. PATIENT-LVL III: CPT | Mod: PBBFAC,,, | Performed by: FAMILY MEDICINE

## 2020-07-02 PROCEDURE — 1101F PT FALLS ASSESS-DOCD LE1/YR: CPT | Mod: CPTII,S$GLB,, | Performed by: FAMILY MEDICINE

## 2020-07-02 PROCEDURE — 36415 COLL VENOUS BLD VENIPUNCTURE: CPT | Mod: PN

## 2020-07-02 PROCEDURE — 99215 PR OFFICE/OUTPT VISIT, EST, LEVL V, 40-54 MIN: ICD-10-PCS | Mod: S$GLB,,, | Performed by: FAMILY MEDICINE

## 2020-07-02 PROCEDURE — 99999 PR PBB SHADOW E&M-EST. PATIENT-LVL III: ICD-10-PCS | Mod: PBBFAC,,, | Performed by: FAMILY MEDICINE

## 2020-07-02 PROCEDURE — 99215 OFFICE O/P EST HI 40 MIN: CPT | Mod: S$GLB,,, | Performed by: FAMILY MEDICINE

## 2020-07-02 PROCEDURE — 1159F MED LIST DOCD IN RCRD: CPT | Mod: S$GLB,,, | Performed by: FAMILY MEDICINE

## 2020-07-02 PROCEDURE — 84153 ASSAY OF PSA TOTAL: CPT

## 2020-07-02 PROCEDURE — 1159F PR MEDICATION LIST DOCUMENTED IN MEDICAL RECORD: ICD-10-PCS | Mod: S$GLB,,, | Performed by: FAMILY MEDICINE

## 2020-07-02 NOTE — PROGRESS NOTES
THIS DOCUMENT WAS MADE IN PART WITH VOICE RECOGNITION SOFTWARE.  OCCASIONALLY THIS SOFTWARE WILL MISINTERPRET WORDS OR PHRASES.      Melvin HALEY Priya  3/17/1930    Melvin was seen today for annual exam.    Diagnoses and all orders for this visit:    Essential hypertension  This is a chronic condition.  This condition has been reviewed and evaluated and it is currently stable.    Hypothyroidism, unspecified type  This is a chronic condition.  This condition has been reviewed and evaluated and it is currently stable.    CKD (chronic kidney disease) stage 3, GFR 30-59 ml/min  This is a chronic condition.  This condition has been reviewed and evaluated and it is currently stable.    Heme positive stool  -     Occult blood x 1, stool; Future  -     Occult blood x 1, stool; Future  -     Occult blood x 1, stool; Future  Did not see GI, no active symtpms so repeat but if positive then GI as originally recommended    Dyslipidemia  This is a chronic condition.  This condition has been reviewed and evaluated and it is currently stable.    Aortic atherosclerosis  Aortic ectasia, abdominal  Comments:  3/2020 ultrasound:  Distal abdominal aortic ectasia with maximum aortic diameter of 2.6 cm compared to 2.3 cm previously.  No other significant changes  Orders:  -     CV Abdominal Aorta; Future  Repeat in one year    Foul smelling urine  -     Urinalysis; Future  -     Urine culture; Future  -     Urinalysis Microscopic; Future    History of prostate cancer  -     PSA, Screening; Future        Subjective     Chief Complaint   Patient presents with    Annual Exam       HPI      Essential hypertension  Chronic stable controlled    Hypothyroidism, unspecified type  Chronic condition this has been stable and well controlled    CKD (chronic kidney disease) stage 3, GFR 30-59 ml/min  Stable    Heme positive stool  At last visit requested hemoccult stools that were positive.  No significant anemia and no iron deficiency.  I had  referred to Gastroenterology but there is no record that he followed.    Dyslipidemia  Stable    Aortic atherosclerosis  Aortic ectasia, abdominal  Comments:  3/2020 ultrasound:  Distal abdominal aortic ectasia with maximum aortic diameter of 2.6 cm compared to 2.3 cm previously.  No other significant changes  Discussed in detail, will repeat in on e year    Foul smell to urine, no dysuria, not straining  Requests PSA    Rash on back and neck after working in yard, but no thinks it is his soap that wife uses, mild improvement with medrol        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   HENT: Negative.    Respiratory: Negative.    Cardiovascular: Negative.    Gastrointestinal: Negative.    Genitourinary: Negative for difficulty urinating, dysuria, frequency and hematuria.   Musculoskeletal: Positive for arthralgias.       Objective     Physical Exam  Vitals signs reviewed.   Constitutional:       General: He is not in acute distress.     Appearance: He is well-developed. He is not diaphoretic.   HENT:  "     Head: Normocephalic and atraumatic.      Right Ear: Tympanic membrane, ear canal and external ear normal.      Left Ear: Tympanic membrane, ear canal and external ear normal.      Nose: Mucosal edema present.   Eyes:      General: No scleral icterus.        Right eye: No discharge.         Left eye: No discharge.      Conjunctiva/sclera: Conjunctivae normal.   Neck:      Musculoskeletal: Normal range of motion and neck supple.      Vascular: No JVD.   Cardiovascular:      Rate and Rhythm: Normal rate and regular rhythm.      Heart sounds: Normal heart sounds. No murmur. No gallop.       Comments: Mild edema  Pulmonary:      Effort: Pulmonary effort is normal. No respiratory distress.      Breath sounds: Normal breath sounds. No stridor. No wheezing or rales.   Abdominal:      General: Abdomen is flat. Bowel sounds are normal. There is no distension.      Palpations: There is no mass.   Skin:     General: Skin is warm and dry.   Neurological:      Mental Status: He is alert and oriented to person, place, and time.      Deep Tendon Reflexes: Reflexes are normal and symmetric.      Comments: Ambulatory, no devices   Psychiatric:         Behavior: Behavior normal.       Vitals:    07/02/20 1349   BP: 138/68   BP Location: Right arm   Patient Position: Sitting   BP Method: Medium (Manual)   Pulse: 82   Temp: 98.1 °F (36.7 °C)   TempSrc: Oral   SpO2: 95%   Weight: 76.4 kg (168 lb 6.9 oz)   Height: 5' 8" (1.727 m)       MOST RECENT LABS IN OUR ELECTRONIC MEDICAL RECORD:     Results for orders placed or performed in visit on 03/13/20   CBC auto differential   Result Value Ref Range    WBC 6.83 3.90 - 12.70 K/uL    RBC 5.07 4.60 - 6.20 M/uL    Hemoglobin 15.6 14.0 - 18.0 g/dL    Hematocrit 48.4 40.0 - 54.0 %    Mean Corpuscular Volume 96 82 - 98 fL    Mean Corpuscular Hemoglobin 30.8 27.0 - 31.0 pg    Mean Corpuscular Hemoglobin Conc 32.2 32.0 - 36.0 g/dL    RDW 13.6 11.5 - 14.5 %    Platelets 212 150 - 350 K/uL    MPV " 9.7 9.2 - 12.9 fL    Immature Granulocytes 0.3 0.0 - 0.5 %    Gran # (ANC) 4.1 1.8 - 7.7 K/uL    Immature Grans (Abs) 0.02 0.00 - 0.04 K/uL    Lymph # 1.7 1.0 - 4.8 K/uL    Mono # 0.7 0.3 - 1.0 K/uL    Eos # 0.2 0.0 - 0.5 K/uL    Baso # 0.03 0.00 - 0.20 K/uL    nRBC 0 0 /100 WBC    Gran% 60.6 38.0 - 73.0 %    Lymph% 25.5 18.0 - 48.0 %    Mono% 9.8 4.0 - 15.0 %    Eosinophil% 3.4 0.0 - 8.0 %    Basophil% 0.4 0.0 - 1.9 %    Differential Method Automated    Ferritin   Result Value Ref Range    Ferritin 262 20.0 - 300.0 ng/mL   Iron and TIBC   Result Value Ref Range    Iron 133 45 - 160 ug/dL    Transferrin 289 200 - 375 mg/dL    TIBC 428 250 - 450 ug/dL    Saturated Iron 31 20 - 50 %

## 2020-07-03 LAB — COMPLEXED PSA SERPL-MCNC: 0.05 NG/ML (ref 0–4)

## 2020-07-14 ENCOUNTER — LAB VISIT (OUTPATIENT)
Dept: LAB | Facility: HOSPITAL | Age: 85
End: 2020-07-14
Attending: FAMILY MEDICINE
Payer: MEDICARE

## 2020-07-14 DIAGNOSIS — R19.5 HEME POSITIVE STOOL: ICD-10-CM

## 2020-07-14 LAB
OB PNL STL: NEGATIVE

## 2020-07-14 PROCEDURE — 82272 OCCULT BLD FECES 1-3 TESTS: CPT | Mod: 91

## 2020-07-15 ENCOUNTER — TELEPHONE (OUTPATIENT)
Dept: FAMILY MEDICINE | Facility: CLINIC | Age: 85
End: 2020-07-15

## 2020-07-15 NOTE — TELEPHONE ENCOUNTER
Occult blood x 3, neg x 3. Is it OK to advise pt that this is normal and do you have any other info to relay? Left msg for pt to call back.

## 2020-07-15 NOTE — TELEPHONE ENCOUNTER
----- Message from Marcia Wong sent at 7/15/2020 10:29 AM CDT -----  Type:  Test Results    Who Called:  Melvin  Name of Test (Lab/Mammo/Etc):  Stool Sample  Date of Test:  7/14  Ordering Provider:  Dr Rebolledo  Where the test was performed:  [facility]  Best Call Back Number:  [phone]  Additional Information:  thank you!

## 2020-07-15 NOTE — TELEPHONE ENCOUNTER
Since the repeat stool cards were negative, we can hold off on GI consultation, as long as he is having no active symptoms.  I will plan to repeat the labs when he returns in a few months

## 2020-08-06 RX ORDER — FUROSEMIDE 40 MG/1
40 TABLET ORAL DAILY
Qty: 90 TABLET | Refills: 1 | Status: SHIPPED | OUTPATIENT
Start: 2020-08-06 | End: 2021-12-28 | Stop reason: DRUGHIGH

## 2020-08-06 NOTE — TELEPHONE ENCOUNTER
Pt is requesting lasix RX, he is asking if he can take one 40mg dose instead of 2 20s bc he forgets evening dose.

## 2020-08-06 NOTE — TELEPHONE ENCOUNTER
We can try that as long as he is monitoring his daily weight, sodium intake, etc..  In fact he can just take to the 20 mg in the morning.  But if he still prefers a new prescription for the 40 mg let me know.  It looks like someone already had this in epic but I do not think a new prescription sent in.

## 2020-08-06 NOTE — TELEPHONE ENCOUNTER
----- Message from Maria Elena Nichole sent at 8/6/2020 11:12 AM CDT -----  Regarding: refill  Contact: patient  Type:  RX Refill Request    Who Called:  Patient   Refill or New Rx:  Refill  RX Name and Strength:  lasix  How is the patient currently taking it? (ex. 1XDay):  one time a day   Is this a 30 day or 90 day RX: 90day    Preferred Pharmacy with phone number:    [facility] - [place], [place] - [address] AT [address] [facility]  [address]  [facility] [address]  Phone: [phone] Fax: [phone]    Local or Mail Order:  Mail Order  Ordering Provider:  Amaury Hui  Best Call Back Number:  [phone] (home)     Additional Information:  Patient want to know if Doctor can write rx to 40mg so he can take it one time a day, patient said with 20mg patient said he forget to take the second dose    Case number [id number]

## 2020-08-14 ENCOUNTER — PATIENT OUTREACH (OUTPATIENT)
Dept: ADMINISTRATIVE | Facility: OTHER | Age: 85
End: 2020-08-14

## 2020-08-14 ENCOUNTER — OFFICE VISIT (OUTPATIENT)
Dept: ORTHOPEDICS | Facility: CLINIC | Age: 85
End: 2020-08-14
Payer: MEDICARE

## 2020-08-14 VITALS
HEART RATE: 79 BPM | DIASTOLIC BLOOD PRESSURE: 73 MMHG | BODY MASS INDEX: 25.53 KG/M2 | WEIGHT: 168.44 LBS | SYSTOLIC BLOOD PRESSURE: 143 MMHG | HEIGHT: 68 IN

## 2020-08-14 DIAGNOSIS — M70.22 OLECRANON BURSITIS OF LEFT ELBOW: Primary | ICD-10-CM

## 2020-08-14 PROCEDURE — 1101F PR PT FALLS ASSESS DOC 0-1 FALLS W/OUT INJ PAST YR: ICD-10-PCS | Mod: CPTII,S$GLB,, | Performed by: ORTHOPAEDIC SURGERY

## 2020-08-14 PROCEDURE — 99213 PR OFFICE/OUTPT VISIT, EST, LEVL III, 20-29 MIN: ICD-10-PCS | Mod: 25,S$GLB,, | Performed by: ORTHOPAEDIC SURGERY

## 2020-08-14 PROCEDURE — 20605 DRAIN/INJ JOINT/BURSA W/O US: CPT | Mod: LT,S$GLB,, | Performed by: ORTHOPAEDIC SURGERY

## 2020-08-14 PROCEDURE — 1101F PT FALLS ASSESS-DOCD LE1/YR: CPT | Mod: CPTII,S$GLB,, | Performed by: ORTHOPAEDIC SURGERY

## 2020-08-14 PROCEDURE — 1125F PR PAIN SEVERITY QUANTIFIED, PAIN PRESENT: ICD-10-PCS | Mod: S$GLB,,, | Performed by: ORTHOPAEDIC SURGERY

## 2020-08-14 PROCEDURE — 99213 OFFICE O/P EST LOW 20 MIN: CPT | Mod: 25,S$GLB,, | Performed by: ORTHOPAEDIC SURGERY

## 2020-08-14 PROCEDURE — 99999 PR PBB SHADOW E&M-EST. PATIENT-LVL IV: CPT | Mod: PBBFAC,,, | Performed by: ORTHOPAEDIC SURGERY

## 2020-08-14 PROCEDURE — 20605 INTERMEDIATE JOINT ASPIRATION/INJECTION: L OLECRANON BURSA: ICD-10-PCS | Mod: LT,S$GLB,, | Performed by: ORTHOPAEDIC SURGERY

## 2020-08-14 PROCEDURE — 99999 PR PBB SHADOW E&M-EST. PATIENT-LVL IV: ICD-10-PCS | Mod: PBBFAC,,, | Performed by: ORTHOPAEDIC SURGERY

## 2020-08-14 PROCEDURE — 1159F PR MEDICATION LIST DOCUMENTED IN MEDICAL RECORD: ICD-10-PCS | Mod: S$GLB,,, | Performed by: ORTHOPAEDIC SURGERY

## 2020-08-14 PROCEDURE — 1125F AMNT PAIN NOTED PAIN PRSNT: CPT | Mod: S$GLB,,, | Performed by: ORTHOPAEDIC SURGERY

## 2020-08-14 PROCEDURE — 1159F MED LIST DOCD IN RCRD: CPT | Mod: S$GLB,,, | Performed by: ORTHOPAEDIC SURGERY

## 2020-08-14 RX ADMIN — TRIAMCINOLONE ACETONIDE 40 MG: 40 INJECTION, SUSPENSION INTRA-ARTICULAR; INTRAMUSCULAR at 10:08

## 2020-08-14 NOTE — PROCEDURES
Intermediate Joint Aspiration/Injection: L olecranon bursa    Date/Time: 8/14/2020 10:00 AM  Performed by: Eagle Overton MD  Authorized by: Eagle Overton MD     Consent Done?:  Yes (Verbal)  Indications:  Joint swelling and pain  Site marked: The procedure site was marked      Location:  Elbow  Site:  L olecranon bursa  Prep: Patient was prepped and draped in usual sterile fashion    Needle size:  18 G  Approach:  Posterior  Medications:  40 mg triamcinolone acetonide 40 mg/mL  Aspirate amount (ml):  8  Aspirate:  Blood-tinged  Patient tolerance:  Patient tolerated the procedure well with no immediate complications     Additional Comments: Elbow was wrapped with ace wrap.

## 2020-08-14 NOTE — PROGRESS NOTES
Chart was reviewed for overdue [facility] Encounters (BERNARDINO)  topics  Updates were requested from care everywhere  Health Maintenance unable to be updated  [place] immunization registry triggered

## 2020-08-14 NOTE — PROGRESS NOTES
"HPI: Melvin Powers is a  89 y.o. male who complains of left elbow pain. His pain began about 2 weeks ago.  No history of injury or trauma. It was not painful for the first week or so but now is becoming sore. He rates his pain as 3/10 today. He came in with is wife today for her f/u visit with me and showed me his elbow and asked if I could drain it for him.     PAST MEDICAL/SURGICAL/FAMILY/SOCIAL/ HISTORY: REVIEWED    ALLERGIES/MEDICATIONS: REVIEWED       Review of Systems:     Constitution: Negative.   HEENT: Negative.   Eyes: Negative.   Cardiovascular: Negative.   Respiratory: Negative.   Endocrine: Negative.   Hematologic/Lymphatic: Negative.   Skin: Negative.   Musculoskeletal: Positive for left elbow pain  Gastrointestinal: Negative.   Genitourinary: Negative.   Neurological: Negative.   Psychiatric/Behavioral: Negative.   Allergic/Immunologic: Negative.       PHYSICAL EXAM:  Vitals:    08/14/20 1001   BP: (!) 143/73   Pulse: 79     Ht Readings from Last 1 Encounters:   08/14/20 5' 8" (1.727 m)     Wt Readings from Last 1 Encounters:   08/14/20 76.4 kg (168 lb 6.9 oz)       GENERAL: Well developed, well nourished, no acute distress. Very pleasant.   SKIN: Skin is intact. No atrophy, abrasions or lesions are noted.   Neurological: Normal mental status. Appropriate and conversant. Alert and oriented x 3.  GAIT: Walks with non-antalgic gait.    Left upper extremity:  2+ radial pulse.  Capillary refill < 3 seconds.  Normal range of motion joints. Normal alignment of the hand, wrist and fingers.  5/5 strength radial, ulnar, and median nerves. Sensation to light touch intact radial, ulnar, median nerves.  Normal range of motion of hand, wrist, elbow and shoulder.  + swelling and tenderness to palpation at the olecranon bursa, no ecchymosis or deformity, no wounds or cellulitis. No lymphadenopathy, no masses or tumors palpated.        ASSESSMENT: Left olecranon bursitis      PLAN:   I spent 25 minutes in " consulation with the patient today. More than half the time was spent counseling the patient on his condition. I aspirated and injected the left olecranon bursa today.   Keep ace wrap compression for 2 weeks. Return to clinic if symptoms persist.

## 2020-08-17 RX ORDER — TRIAMCINOLONE ACETONIDE 40 MG/ML
40 INJECTION, SUSPENSION INTRA-ARTICULAR; INTRAMUSCULAR
Status: DISCONTINUED | OUTPATIENT
Start: 2020-08-14 | End: 2020-08-17 | Stop reason: HOSPADM

## 2020-11-23 PROBLEM — I10 WHITE COAT SYNDROME WITH DIAGNOSIS OF HYPERTENSION: Status: ACTIVE | Noted: 2020-11-23

## 2020-12-14 ENCOUNTER — OFFICE VISIT (OUTPATIENT)
Dept: FAMILY MEDICINE | Facility: CLINIC | Age: 85
End: 2020-12-14
Payer: MEDICARE

## 2020-12-14 ENCOUNTER — LAB VISIT (OUTPATIENT)
Dept: LAB | Facility: HOSPITAL | Age: 85
End: 2020-12-14
Attending: FAMILY MEDICINE
Payer: MEDICARE

## 2020-12-14 VITALS
OXYGEN SATURATION: 97 % | DIASTOLIC BLOOD PRESSURE: 58 MMHG | TEMPERATURE: 98 F | BODY MASS INDEX: 25.56 KG/M2 | SYSTOLIC BLOOD PRESSURE: 128 MMHG | HEART RATE: 75 BPM | WEIGHT: 168.13 LBS

## 2020-12-14 DIAGNOSIS — I10 ESSENTIAL HYPERTENSION: Primary | ICD-10-CM

## 2020-12-14 DIAGNOSIS — E78.5 DYSLIPIDEMIA: ICD-10-CM

## 2020-12-14 DIAGNOSIS — N18.30 STAGE 3 CHRONIC KIDNEY DISEASE, UNSPECIFIED WHETHER STAGE 3A OR 3B CKD: ICD-10-CM

## 2020-12-14 DIAGNOSIS — R41.3 MEMORY LOSS: ICD-10-CM

## 2020-12-14 DIAGNOSIS — E03.9 HYPOTHYROIDISM, UNSPECIFIED TYPE: ICD-10-CM

## 2020-12-14 DIAGNOSIS — I10 ESSENTIAL HYPERTENSION: ICD-10-CM

## 2020-12-14 PROCEDURE — 90694 VACC AIIV4 NO PRSRV 0.5ML IM: CPT | Mod: S$GLB,,, | Performed by: FAMILY MEDICINE

## 2020-12-14 PROCEDURE — 80061 LIPID PANEL: CPT

## 2020-12-14 PROCEDURE — 84443 ASSAY THYROID STIM HORMONE: CPT

## 2020-12-14 PROCEDURE — 1159F MED LIST DOCD IN RCRD: CPT | Mod: S$GLB,,, | Performed by: FAMILY MEDICINE

## 2020-12-14 PROCEDURE — 90694 FLU VACCINE - QUADRIVALENT - ADJUVANTED: ICD-10-PCS | Mod: S$GLB,,, | Performed by: FAMILY MEDICINE

## 2020-12-14 PROCEDURE — 99999 PR PBB SHADOW E&M-EST. PATIENT-LVL IV: ICD-10-PCS | Mod: PBBFAC,,, | Performed by: FAMILY MEDICINE

## 2020-12-14 PROCEDURE — 80053 COMPREHEN METABOLIC PANEL: CPT

## 2020-12-14 PROCEDURE — 99999 PR PBB SHADOW E&M-EST. PATIENT-LVL IV: CPT | Mod: PBBFAC,,, | Performed by: FAMILY MEDICINE

## 2020-12-14 PROCEDURE — G0008 FLU VACCINE - QUADRIVALENT - ADJUVANTED: ICD-10-PCS | Mod: S$GLB,,, | Performed by: FAMILY MEDICINE

## 2020-12-14 PROCEDURE — 3288F PR FALLS RISK ASSESSMENT DOCUMENTED: ICD-10-PCS | Mod: CPTII,S$GLB,, | Performed by: FAMILY MEDICINE

## 2020-12-14 PROCEDURE — 1101F PT FALLS ASSESS-DOCD LE1/YR: CPT | Mod: CPTII,S$GLB,, | Performed by: FAMILY MEDICINE

## 2020-12-14 PROCEDURE — 36415 COLL VENOUS BLD VENIPUNCTURE: CPT | Mod: PN

## 2020-12-14 PROCEDURE — 1159F PR MEDICATION LIST DOCUMENTED IN MEDICAL RECORD: ICD-10-PCS | Mod: S$GLB,,, | Performed by: FAMILY MEDICINE

## 2020-12-14 PROCEDURE — 99214 OFFICE O/P EST MOD 30 MIN: CPT | Mod: 25,S$GLB,, | Performed by: FAMILY MEDICINE

## 2020-12-14 PROCEDURE — 99214 PR OFFICE/OUTPT VISIT, EST, LEVL IV, 30-39 MIN: ICD-10-PCS | Mod: 25,S$GLB,, | Performed by: FAMILY MEDICINE

## 2020-12-14 PROCEDURE — G0008 ADMIN INFLUENZA VIRUS VAC: HCPCS | Mod: S$GLB,,, | Performed by: FAMILY MEDICINE

## 2020-12-14 PROCEDURE — 1101F PR PT FALLS ASSESS DOC 0-1 FALLS W/OUT INJ PAST YR: ICD-10-PCS | Mod: CPTII,S$GLB,, | Performed by: FAMILY MEDICINE

## 2020-12-14 PROCEDURE — 3288F FALL RISK ASSESSMENT DOCD: CPT | Mod: CPTII,S$GLB,, | Performed by: FAMILY MEDICINE

## 2020-12-14 RX ORDER — DONEPEZIL HYDROCHLORIDE 5 MG/1
5 TABLET, FILM COATED ORAL NIGHTLY
Qty: 90 TABLET | Refills: 1 | Status: SHIPPED | OUTPATIENT
Start: 2020-12-14 | End: 2021-03-15 | Stop reason: SDUPTHER

## 2020-12-14 NOTE — PROGRESS NOTES
THIS DOCUMENT WAS MADE IN PART WITH VOICE RECOGNITION SOFTWARE.  OCCASIONALLY THIS SOFTWARE WILL MISINTERPRET WORDS OR PHRASES.      Melvin Powers  3/17/1930    Melvin was seen today for hypertension.    Diagnoses and all orders for this visit:    Essential hypertension  -     Comprehensive Metabolic Panel; Future  -     Lipid Panel; Future  Home average appears satisfactory.  He should continue to monitor.  He will continue to follow with his cardiologist, Dr. Valencia    Hypothyroidism, unspecified type  -     TSH; Future    Stage 3 chronic kidney disease, unspecified whether stage 3a or 3b CKD  -     Comprehensive Metabolic Panel; Future    Dyslipidemia  -     Lipid Panel; Future    Memory loss  Discussed possibilities.  I offered referral to Neurology for formal evaluation however he declined, he states I just want to try medicine.  Therefore I think is reasonable to begin Aricept 5 mg.  Will likely titrate.  He will follow-up in a few months to re-evaluate.  Side effects and precautions reviewed    Other orders  -     donepeziL (ARICEPT) 5 MG tablet; Take 1 tablet (5 mg total) by mouth every evening.  -     Influenza - Quadrivalent (Adjuvanted)  Regarding pneumococcal vaccinations, patient had the Prevnar 13 in 2016, previous Pneumovax was before current record but he has had this after turning 65.        We discussed my transition to the [facility].  And while he may be a candidate, it does not appear that his current healthcare provider is partnered for this clinic.  Therefore we will help him establish with one of my colleagues in a few months.    Subjective     Chief Complaint   Patient presents with    Hypertension       HPI    HTN was elevated per Dr. Valencia, 'always good at home'.  He does not know which medicine was changed by Cardiology but states his medicine card is correct.  He reports blood pressure at home is not change in typically runs around 118-120/70.  He denies lightheadedness or  syncope.  No chest pains or shortness of breath.    He does complain of some memory problems, gradual progression.  He still drives, states he has not ever been lost driving.  He describes more symptoms like going to the cabinet forgetting what he was doing and waiting a while before it comes to him.  He however does not have any family members or anyone else here for comparison other than his wife who appears unaware of any problems.  He denies depression.        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 Negative for fever and unexpected weight change.   Respiratory: Negative for shortness of breath.    Cardiovascular: Negative for chest pain, palpitations and leg swelling.   Gastrointestinal: Negative.    Genitourinary: Negative for dysuria and hematuria.   Musculoskeletal: Positive for arthralgias.   Neurological: Negative for syncope, weakness and headaches.   Psychiatric/Behavioral: Positive  for decreased concentration. Negative for agitation, behavioral problems, dysphoric mood, hallucinations, sleep disturbance and suicidal ideas. The patient is not nervous/anxious.        Objective     Physical Exam  Vitals signs reviewed.   Constitutional:       General: He is not in acute distress.     Appearance: He is well-developed. He is not diaphoretic.   HENT:      Head: Normocephalic and atraumatic.      Right Ear: Tympanic membrane, ear canal and external ear normal.      Left Ear: Tympanic membrane, ear canal and external ear normal.   Eyes:      General: No scleral icterus.        Right eye: No discharge.         Left eye: No discharge.      Conjunctiva/sclera: Conjunctivae normal.   Neck:      Musculoskeletal: Normal range of motion and neck supple.      Vascular: No JVD.   Cardiovascular:      Rate and Rhythm: Normal rate and regular rhythm.      Heart sounds: Normal heart sounds. No murmur. No gallop.       Comments: Bilateral trace ankle and pretibial edema  Pulmonary:      Effort: Pulmonary effort is normal. No respiratory distress.      Breath sounds: Normal breath sounds. No stridor. No wheezing or rales.   Abdominal:      General: Abdomen is flat. Bowel sounds are normal. There is no distension.      Palpations: There is no mass.   Skin:     General: Skin is warm and dry.   Neurological:      Mental Status: He is alert and oriented to person, place, and time.      Deep Tendon Reflexes: Reflexes are normal and symmetric.      Comments: Ambulatory, no devices   Psychiatric:         Behavior: Behavior normal.       Vitals:    12/14/20 1314 12/14/20 1345   BP: (!) 144/66 (!) 128/58   BP Location: Right arm    Patient Position: Sitting    BP Method: Medium (Manual)    Pulse: 75    Temp: 98.1 °F (36.7 °C)    TempSrc: Skin    SpO2: 97%    Weight: 76.2 kg (168 lb 1.6 oz)        MOST RECENT LABS IN OUR ELECTRONIC MEDICAL RECORD:     Results for orders placed or performed in visit on 07/14/20   Occult blood  x 1, stool    Specimen: Stool   Result Value Ref Range    Occult Blood Negative Negative   Occult blood x 1, stool    Specimen: Stool   Result Value Ref Range    Occult Blood Negative Negative   Occult blood x 1, stool    Specimen: Stool   Result Value Ref Range    Occult Blood Negative Negative

## 2020-12-15 DIAGNOSIS — M65.332 TRIGGER MIDDLE FINGER OF LEFT HAND: Primary | ICD-10-CM

## 2020-12-15 LAB
ALBUMIN SERPL BCP-MCNC: 4.5 G/DL (ref 3.5–5.2)
ALP SERPL-CCNC: 66 U/L (ref 55–135)
ALT SERPL W/O P-5'-P-CCNC: 31 U/L (ref 10–44)
ANION GAP SERPL CALC-SCNC: 10 MMOL/L (ref 8–16)
AST SERPL-CCNC: 36 U/L (ref 10–40)
BILIRUB SERPL-MCNC: 0.8 MG/DL (ref 0.1–1)
BUN SERPL-MCNC: 18 MG/DL (ref 8–23)
CALCIUM SERPL-MCNC: 9.4 MG/DL (ref 8.7–10.5)
CHLORIDE SERPL-SCNC: 102 MMOL/L (ref 95–110)
CHOLEST SERPL-MCNC: 144 MG/DL (ref 120–199)
CHOLEST/HDLC SERPL: 3.1 {RATIO} (ref 2–5)
CO2 SERPL-SCNC: 27 MMOL/L (ref 23–29)
CREAT SERPL-MCNC: 1.3 MG/DL (ref 0.5–1.4)
EST. GFR  (AFRICAN AMERICAN): 55.5 ML/MIN/1.73 M^2
EST. GFR  (NON AFRICAN AMERICAN): 48 ML/MIN/1.73 M^2
GLUCOSE SERPL-MCNC: 98 MG/DL (ref 70–110)
HDLC SERPL-MCNC: 46 MG/DL (ref 40–75)
HDLC SERPL: 31.9 % (ref 20–50)
LDLC SERPL CALC-MCNC: 79.6 MG/DL (ref 63–159)
NONHDLC SERPL-MCNC: 98 MG/DL
POTASSIUM SERPL-SCNC: 4.7 MMOL/L (ref 3.5–5.1)
PROT SERPL-MCNC: 7.5 G/DL (ref 6–8.4)
SODIUM SERPL-SCNC: 139 MMOL/L (ref 136–145)
TRIGL SERPL-MCNC: 92 MG/DL (ref 30–150)
TSH SERPL DL<=0.005 MIU/L-ACNC: 2.09 UIU/ML (ref 0.4–4)

## 2020-12-17 ENCOUNTER — PATIENT OUTREACH (OUTPATIENT)
Dept: ADMINISTRATIVE | Facility: OTHER | Age: 85
End: 2020-12-17

## 2020-12-17 NOTE — PROGRESS NOTES
LINKS immunization registry not responding  Care Everywhere updated  Health Maintenance updated  Chart reviewed for overdue Proactive [facility] Encounters ([facility]) health maintenance testing (CRS, Breast Ca, Diabetic Eye Exam)   Orders entered:N/A

## 2020-12-20 ENCOUNTER — NURSE TRIAGE (OUTPATIENT)
Dept: ADMINISTRATIVE | Facility: CLINIC | Age: 85
End: 2020-12-20

## 2020-12-20 NOTE — TELEPHONE ENCOUNTER
Patient states he has an appointment with Dr. Grimaldo Monday at 2. Was possibly exposed to Covid a week ago, has no symptoms. Wants to be tested before going to Dr. Grimaldo before cancelling appointment to prevent fees.  Advised per protocol. Understanding verbalized.    Reason for Disposition   COVID-19 Testing, questions about    Protocols used: CORONAVIRUS (COVID-19) - EXPOSURE-A-AH

## 2020-12-21 ENCOUNTER — TELEPHONE (OUTPATIENT)
Dept: ORTHOPEDICS | Facility: CLINIC | Age: 85
End: 2020-12-21

## 2020-12-21 ENCOUNTER — CLINICAL SUPPORT (OUTPATIENT)
Dept: URGENT CARE | Facility: CLINIC | Age: 85
End: 2020-12-21
Payer: MEDICARE

## 2020-12-21 DIAGNOSIS — Z20.822 CLOSE EXPOSURE TO COVID-19 VIRUS: Primary | ICD-10-CM

## 2020-12-21 LAB
CTP QC/QA: YES
SARS-COV-2 RDRP RESP QL NAA+PROBE: NEGATIVE

## 2020-12-21 PROCEDURE — U0002: ICD-10-PCS | Mod: QW,S$GLB,, | Performed by: FAMILY MEDICINE

## 2020-12-21 PROCEDURE — U0002 COVID-19 LAB TEST NON-CDC: HCPCS | Mod: QW,S$GLB,, | Performed by: FAMILY MEDICINE

## 2020-12-21 NOTE — TELEPHONE ENCOUNTER
Called and spoke to patient and he states that he does not want to come in because he may have covid and he will call back when he receives his results to reschedule

## 2020-12-23 ENCOUNTER — HOSPITAL ENCOUNTER (OUTPATIENT)
Dept: RADIOLOGY | Facility: HOSPITAL | Age: 85
Discharge: HOME | End: 2020-12-23
Attending: ORTHOPAEDIC SURGERY
Payer: MEDICARE

## 2020-12-23 ENCOUNTER — OFFICE VISIT (OUTPATIENT)
Dept: ORTHOPEDICS | Facility: CLINIC | Age: 85
End: 2020-12-23
Payer: MEDICARE

## 2020-12-23 VITALS — HEIGHT: 68 IN | WEIGHT: 168 LBS | BODY MASS INDEX: 25.46 KG/M2

## 2020-12-23 DIAGNOSIS — M25.531 RIGHT WRIST PAIN: ICD-10-CM

## 2020-12-23 DIAGNOSIS — M65.332 TRIGGER MIDDLE FINGER OF LEFT HAND: ICD-10-CM

## 2020-12-23 DIAGNOSIS — M79.641 RIGHT HAND PAIN: ICD-10-CM

## 2020-12-23 DIAGNOSIS — M65.341 TRIGGER RING FINGER OF RIGHT HAND: Primary | ICD-10-CM

## 2020-12-23 DIAGNOSIS — N18.30 STAGE 3 CHRONIC KIDNEY DISEASE, UNSPECIFIED WHETHER STAGE 3A OR 3B CKD: Chronic | ICD-10-CM

## 2020-12-23 PROCEDURE — 20550 TENDON SHEATH: R RING MCP: ICD-10-PCS | Mod: F2,S$GLB,, | Performed by: ORTHOPAEDIC SURGERY

## 2020-12-23 PROCEDURE — 73130 X-RAY EXAM OF HAND: CPT | Mod: 26,50,, | Performed by: RADIOLOGY

## 2020-12-23 PROCEDURE — 99214 PR OFFICE/OUTPT VISIT, EST, LEVL IV, 30-39 MIN: ICD-10-PCS | Mod: 25,S$GLB,, | Performed by: ORTHOPAEDIC SURGERY

## 2020-12-23 PROCEDURE — 20550 NJX 1 TENDON SHEATH/LIGAMENT: CPT | Mod: 51,F8,S$GLB, | Performed by: ORTHOPAEDIC SURGERY

## 2020-12-23 PROCEDURE — 20550 NJX 1 TENDON SHEATH/LIGAMENT: CPT | Mod: F2,S$GLB,, | Performed by: ORTHOPAEDIC SURGERY

## 2020-12-23 PROCEDURE — 3288F PR FALLS RISK ASSESSMENT DOCUMENTED: ICD-10-PCS | Mod: CPTII,S$GLB,, | Performed by: ORTHOPAEDIC SURGERY

## 2020-12-23 PROCEDURE — 1159F PR MEDICATION LIST DOCUMENTED IN MEDICAL RECORD: ICD-10-PCS | Mod: S$GLB,,, | Performed by: ORTHOPAEDIC SURGERY

## 2020-12-23 PROCEDURE — 73110 XR WRIST COMPLETE 3 VIEWS BILATERAL: ICD-10-PCS | Mod: 26,50,, | Performed by: RADIOLOGY

## 2020-12-23 PROCEDURE — 1101F PT FALLS ASSESS-DOCD LE1/YR: CPT | Mod: CPTII,S$GLB,, | Performed by: ORTHOPAEDIC SURGERY

## 2020-12-23 PROCEDURE — 99999 PR PBB SHADOW E&M-EST. PATIENT-LVL III: CPT | Mod: PBBFAC,,, | Performed by: ORTHOPAEDIC SURGERY

## 2020-12-23 PROCEDURE — 99999 PR PBB SHADOW E&M-EST. PATIENT-LVL III: ICD-10-PCS | Mod: PBBFAC,,, | Performed by: ORTHOPAEDIC SURGERY

## 2020-12-23 PROCEDURE — 1125F AMNT PAIN NOTED PAIN PRSNT: CPT | Mod: S$GLB,,, | Performed by: ORTHOPAEDIC SURGERY

## 2020-12-23 PROCEDURE — 73110 X-RAY EXAM OF WRIST: CPT | Mod: TC,50,PO

## 2020-12-23 PROCEDURE — 73110 X-RAY EXAM OF WRIST: CPT | Mod: 26,50,, | Performed by: RADIOLOGY

## 2020-12-23 PROCEDURE — 1159F MED LIST DOCD IN RCRD: CPT | Mod: S$GLB,,, | Performed by: ORTHOPAEDIC SURGERY

## 2020-12-23 PROCEDURE — 99214 OFFICE O/P EST MOD 30 MIN: CPT | Mod: 25,S$GLB,, | Performed by: ORTHOPAEDIC SURGERY

## 2020-12-23 PROCEDURE — 73130 XR HAND COMPLETE 3 VIEWS BILATERAL: ICD-10-PCS | Mod: 26,50,, | Performed by: RADIOLOGY

## 2020-12-23 PROCEDURE — 1101F PR PT FALLS ASSESS DOC 0-1 FALLS W/OUT INJ PAST YR: ICD-10-PCS | Mod: CPTII,S$GLB,, | Performed by: ORTHOPAEDIC SURGERY

## 2020-12-23 PROCEDURE — 73130 X-RAY EXAM OF HAND: CPT | Mod: TC,50,PO

## 2020-12-23 PROCEDURE — 1125F PR PAIN SEVERITY QUANTIFIED, PAIN PRESENT: ICD-10-PCS | Mod: S$GLB,,, | Performed by: ORTHOPAEDIC SURGERY

## 2020-12-23 PROCEDURE — 3288F FALL RISK ASSESSMENT DOCD: CPT | Mod: CPTII,S$GLB,, | Performed by: ORTHOPAEDIC SURGERY

## 2020-12-23 RX ORDER — TRIAMCINOLONE ACETONIDE 40 MG/ML
40 INJECTION, SUSPENSION INTRA-ARTICULAR; INTRAMUSCULAR
Status: DISCONTINUED | OUTPATIENT
Start: 2020-12-23 | End: 2020-12-23 | Stop reason: HOSPADM

## 2020-12-23 RX ADMIN — TRIAMCINOLONE ACETONIDE 40 MG: 40 INJECTION, SUSPENSION INTRA-ARTICULAR; INTRAMUSCULAR at 11:12

## 2020-12-23 NOTE — PROCEDURES
Tendon Sheath: L long MCP    Date/Time: 12/23/2020 11:15 AM  Performed by: Brijesh Chau MD  Authorized by: Brijesh Chau MD     Location:  Long finger  Site:  L long MCP  Medications:  40 mg triamcinolone acetonide 40 mg/mL  Patient tolerance:  Patient tolerated the procedure well with no immediate complications

## 2020-12-23 NOTE — PROGRESS NOTES
89 years old complaining locking catching trigger his left middle finger right ring finger for about 2 weeks time.  He has received Kenalog injections the past responded well and resting injections today    Exam shows he does indeed have an elicitable trigger of the left middle finger and right ring finger without signs infection or instability    X-rays do not show acute injury    Assessment:  Left middle trigger finger, right ring trigger finger    Plan:  Kenalog injections, follow-up as needed    Further History  Aching pain  Worse with activity  Relieved with rest  No other associated symptoms  No other radiation    Further Exam  Alert and oriented  Pleasant  Contralateral limb has appropriate range of motion for age and condition  Contralateral limb has appropriate strength for age and condition  Contralateral limb has appropriate stability  for age and condition  No adenopathy  Pulses are appropriate for current condition  Skin is intact        Chief Complaint    Chief Complaint   Patient presents with    Left Hand - Pain       HPI  Melvin Powers is a 89 y.o.  male who presents with       Past Medical History  Past Medical History:   Diagnosis Date    Cataract     CKD (chronic kidney disease) stage 3, GFR 30-59 ml/min 3/16/2014    Coronary artery disease     GERD (gastroesophageal reflux disease)     Irritable bowel syndrome with constipation 11/30/2016       Past Surgical History  Past Surgical History:   Procedure Laterality Date    APPENDECTOMY      COLONOSCOPY      CYSTOSCOPY      pterygium removal Left     UPPER GASTROINTESTINAL ENDOSCOPY         Medications  Current Outpatient Medications   Medication Sig    aspirin (ECOTRIN) 81 MG EC tablet Take 81 mg by mouth once daily.    cholecalciferol, vitamin D3, (VITAMIN D3) 1,000 unit capsule Vitamin D3    clotrimazole-betamethasone (LOTRISONE) lotion Apply topically 2 (two) times daily. Apply twice daily    coenzyme Q10 (COQ-10) 100 mg  "capsule Take 100 mg by mouth 2 (two) times daily.    CRESTOR 40 mg Tab TAKE 1 TABLET DAILY    donepeziL (ARICEPT) 5 MG tablet Take 1 tablet (5 mg total) by mouth every evening.    esomeprazole (NEXIUM) 40 MG capsule Nexium 40 mg capsule,delayed release   Take 1 capsule every day by oral route as needed.    furosemide (LASIX) 40 MG tablet Take 1 tablet (40 mg total) by mouth once daily.    lisinopriL 10 MG tablet Take 1 tablet (10 mg total) by mouth once daily.    magnesium gluconate 27.5 mg (500 mg) Tab Take 500 mg by mouth 2 (two) times daily.    meclizine (ANTIVERT) 25 mg tablet Take 1 tablet (25 mg total) by mouth 3 (three) times daily as needed.    metoprolol succinate (TOPROL-XL) 25 MG 24 hr tablet Take 25 mg by mouth daily as needed (Palpatations).    multivitamin (THERAGRAN) per tablet Take 1 tablet by mouth once daily.    nitroGLYCERIN 0.1 mg/hr TD PT24 (NITRODUR) 0.1 mg/hr PT24 PLACE 1 PATCH ONTO THE SKIN ONCE D    SYNTHROID 75 mcg tablet TAKE 1 TABLET DAILY    vitamin E 400 UNIT capsule vitamin E 400 unit capsule   Take 1 capsule every day by oral route.    zolpidem (AMBIEN CR) 6.25 MG CR tablet Take 6.25 mg by mouth nightly as needed for Insomnia.     No current facility-administered medications for this visit.        Allergies  Review of patient's allergies indicates:   Allergen Reactions    Atorvastatin Other (See Comments)     Other reaction(s): Muscle pain    Codeine      Other reaction(s): makes him"goofey"    Contrast media     Iodinated contrast media      Other reaction(s): Rash       Family History  Family History   Problem Relation Age of Onset    Rheum arthritis Mother     Cancer Father     Rheum arthritis Father     Cancer Sister         bone    Cancer Brother        Social History  Social History     Socioeconomic History    Marital status:      Spouse name: Not on file    Number of children: Not on file    Years of education: Not on file    Highest education " level: Not on file   Occupational History    Not on file   Social Needs    Financial resource strain: Not on file    Food insecurity     Worry: Not on file     Inability: Not on file    Transportation needs     Medical: Not on file     Non-medical: Not on file   Tobacco Use    Smoking status: Never Smoker    Smokeless tobacco: Never Used   Substance and Sexual Activity    Alcohol use: Yes     Alcohol/week: 2.0 standard drinks     Types: 1 Glasses of wine, 1 Cans of beer per week     Drinks per session: 1 or 2    Drug use: No    Sexual activity: Not on file   Lifestyle    Physical activity     Days per week: Not on file     Minutes per session: Not on file    Stress: Not on file   Relationships    Social connections     Talks on phone: Not on file     Gets together: Not on file     Attends Protestant service: Not on file     Active member of club or organization: Not on file     Attends meetings of clubs or organizations: Not on file     Relationship status: Not on file   Other Topics Concern    Not on file   Social History Narrative    Not on file               Review of Systems     Constitutional: Negative    HENT: Negative  Eyes: Negative  Respiratory: Negative  Cardiovascular: Negative  Musculoskeletal: HPI  Skin: Negative  Neurological: Negative  Hematological: Negative  Endocrine: Negative                 Physical Exam    There were no vitals filed for this visit.  Body mass index is 25.54 kg/m².  Physical Examination:     General appearance -  well appearing, and in no distress  Mental status - awake  Neck - supple  Chest -  symmetric air entry  Heart - normal rate   Abdomen - soft      Assessment     1. Trigger ring finger of right hand    2. Trigger middle finger of left hand    3. Stage 3 chronic kidney disease, unspecified whether stage 3a or 3b CKD          Plan

## 2020-12-23 NOTE — PROCEDURES
Tendon Sheath: R ring MCP    Date/Time: 12/23/2020 11:15 AM  Performed by: Brijesh Chau MD  Authorized by: rBijesh Chau MD     Location:  Ring finger  Site:  R ring MCP  Medications:  40 mg triamcinolone acetonide 40 mg/mL  Patient tolerance:  Patient tolerated the procedure well with no immediate complications

## 2021-03-15 ENCOUNTER — OFFICE VISIT (OUTPATIENT)
Dept: FAMILY MEDICINE | Facility: CLINIC | Age: 86
End: 2021-03-15
Payer: MEDICARE

## 2021-03-15 VITALS
HEART RATE: 77 BPM | BODY MASS INDEX: 24.66 KG/M2 | OXYGEN SATURATION: 98 % | SYSTOLIC BLOOD PRESSURE: 152 MMHG | TEMPERATURE: 98 F | HEIGHT: 68 IN | WEIGHT: 162.69 LBS | DIASTOLIC BLOOD PRESSURE: 76 MMHG

## 2021-03-15 DIAGNOSIS — R41.3 MEMORY IMPAIRMENT: ICD-10-CM

## 2021-03-15 DIAGNOSIS — N18.30 STAGE 3 CHRONIC KIDNEY DISEASE, UNSPECIFIED WHETHER STAGE 3A OR 3B CKD: ICD-10-CM

## 2021-03-15 DIAGNOSIS — K21.9 GASTROESOPHAGEAL REFLUX DISEASE WITHOUT ESOPHAGITIS: Chronic | ICD-10-CM

## 2021-03-15 DIAGNOSIS — I70.0 AORTIC ATHEROSCLEROSIS: ICD-10-CM

## 2021-03-15 DIAGNOSIS — E03.9 ACQUIRED HYPOTHYROIDISM: ICD-10-CM

## 2021-03-15 DIAGNOSIS — K58.1 IRRITABLE BOWEL SYNDROME WITH CONSTIPATION: ICD-10-CM

## 2021-03-15 DIAGNOSIS — I25.10 CORONARY ARTERY DISEASE DUE TO CALCIFIED CORONARY LESION: ICD-10-CM

## 2021-03-15 DIAGNOSIS — I10 ESSENTIAL HYPERTENSION: Primary | ICD-10-CM

## 2021-03-15 DIAGNOSIS — Z85.46 HISTORY OF PROSTATE CANCER: ICD-10-CM

## 2021-03-15 DIAGNOSIS — Z23 NEED FOR VACCINATION: ICD-10-CM

## 2021-03-15 DIAGNOSIS — I25.84 CORONARY ARTERY DISEASE DUE TO CALCIFIED CORONARY LESION: ICD-10-CM

## 2021-03-15 DIAGNOSIS — I77.811 AORTIC ECTASIA, ABDOMINAL: ICD-10-CM

## 2021-03-15 PROBLEM — C61 CA OF PROSTATE: Status: ACTIVE | Noted: 2021-03-15

## 2021-03-15 PROBLEM — C61 CA OF PROSTATE: Status: RESOLVED | Noted: 2021-03-15 | Resolved: 2021-03-15

## 2021-03-15 PROCEDURE — 1159F PR MEDICATION LIST DOCUMENTED IN MEDICAL RECORD: ICD-10-PCS | Mod: S$GLB,,, | Performed by: INTERNAL MEDICINE

## 2021-03-15 PROCEDURE — 1159F MED LIST DOCD IN RCRD: CPT | Mod: S$GLB,,, | Performed by: INTERNAL MEDICINE

## 2021-03-15 PROCEDURE — 90732 PPSV23 VACC 2 YRS+ SUBQ/IM: CPT | Mod: S$GLB,,, | Performed by: INTERNAL MEDICINE

## 2021-03-15 PROCEDURE — 1101F PR PT FALLS ASSESS DOC 0-1 FALLS W/OUT INJ PAST YR: ICD-10-PCS | Mod: CPTII,S$GLB,, | Performed by: INTERNAL MEDICINE

## 2021-03-15 PROCEDURE — 90732 PNEUMOCOCCAL POLYSACCHARIDE VACCINE 23-VALENT =>2YO SQ IM: ICD-10-PCS | Mod: S$GLB,,, | Performed by: INTERNAL MEDICINE

## 2021-03-15 PROCEDURE — G0009 PNEUMOCOCCAL POLYSACCHARIDE VACCINE 23-VALENT =>2YO SQ IM: ICD-10-PCS | Mod: S$GLB,,, | Performed by: INTERNAL MEDICINE

## 2021-03-15 PROCEDURE — 99214 PR OFFICE/OUTPT VISIT, EST, LEVL IV, 30-39 MIN: ICD-10-PCS | Mod: 25,S$GLB,, | Performed by: INTERNAL MEDICINE

## 2021-03-15 PROCEDURE — 3288F FALL RISK ASSESSMENT DOCD: CPT | Mod: CPTII,S$GLB,, | Performed by: INTERNAL MEDICINE

## 2021-03-15 PROCEDURE — 99999 PR PBB SHADOW E&M-EST. PATIENT-LVL IV: CPT | Mod: PBBFAC,,, | Performed by: INTERNAL MEDICINE

## 2021-03-15 PROCEDURE — 99999 PR PBB SHADOW E&M-EST. PATIENT-LVL IV: ICD-10-PCS | Mod: PBBFAC,,, | Performed by: INTERNAL MEDICINE

## 2021-03-15 PROCEDURE — 99214 OFFICE O/P EST MOD 30 MIN: CPT | Mod: 25,S$GLB,, | Performed by: INTERNAL MEDICINE

## 2021-03-15 PROCEDURE — G0009 ADMIN PNEUMOCOCCAL VACCINE: HCPCS | Mod: S$GLB,,, | Performed by: INTERNAL MEDICINE

## 2021-03-15 PROCEDURE — 3288F PR FALLS RISK ASSESSMENT DOCUMENTED: ICD-10-PCS | Mod: CPTII,S$GLB,, | Performed by: INTERNAL MEDICINE

## 2021-03-15 PROCEDURE — 1101F PT FALLS ASSESS-DOCD LE1/YR: CPT | Mod: CPTII,S$GLB,, | Performed by: INTERNAL MEDICINE

## 2021-03-15 RX ORDER — METOPROLOL SUCCINATE 25 MG/1
12.5 TABLET, EXTENDED RELEASE ORAL DAILY
Qty: 45 TABLET | Refills: 1 | OUTPATIENT
Start: 2021-03-15 | End: 2021-11-21

## 2021-03-15 RX ORDER — ROSUVASTATIN CALCIUM 40 MG/1
40 TABLET, FILM COATED ORAL DAILY
Qty: 90 TABLET | Refills: 3 | Status: SHIPPED | OUTPATIENT
Start: 2021-03-15 | End: 2021-03-19 | Stop reason: SDUPTHER

## 2021-03-15 RX ORDER — DONEPEZIL HYDROCHLORIDE 5 MG/1
5 TABLET, FILM COATED ORAL NIGHTLY
Qty: 90 TABLET | Refills: 1 | OUTPATIENT
Start: 2021-03-15 | End: 2021-11-21

## 2021-03-15 RX ORDER — LISINOPRIL 10 MG/1
5 TABLET ORAL DAILY
Qty: 45 TABLET | Refills: 1 | Status: SHIPPED | OUTPATIENT
Start: 2021-03-15 | End: 2023-08-17

## 2021-03-15 RX ORDER — LEVOTHYROXINE SODIUM 75 UG/1
75 TABLET ORAL DAILY
Qty: 90 TABLET | Refills: 3 | Status: SHIPPED | OUTPATIENT
Start: 2021-03-15 | End: 2021-03-15 | Stop reason: SDUPTHER

## 2021-03-15 RX ORDER — LEVOTHYROXINE SODIUM 75 UG/1
75 TABLET ORAL DAILY
Qty: 90 TABLET | Refills: 3 | Status: SHIPPED | OUTPATIENT
Start: 2021-03-15 | End: 2021-06-15 | Stop reason: SDUPTHER

## 2021-03-19 DIAGNOSIS — I70.0 AORTIC ATHEROSCLEROSIS: ICD-10-CM

## 2021-03-19 DIAGNOSIS — I25.84 CORONARY ARTERY DISEASE DUE TO CALCIFIED CORONARY LESION: ICD-10-CM

## 2021-03-19 DIAGNOSIS — I25.10 CORONARY ARTERY DISEASE DUE TO CALCIFIED CORONARY LESION: ICD-10-CM

## 2021-03-19 DIAGNOSIS — I77.811 AORTIC ECTASIA, ABDOMINAL: ICD-10-CM

## 2021-03-19 RX ORDER — ROSUVASTATIN CALCIUM 40 MG/1
40 TABLET, COATED ORAL DAILY
Qty: 90 TABLET | Refills: 3 | Status: SHIPPED | OUTPATIENT
Start: 2021-03-19 | End: 2021-06-15 | Stop reason: SDUPTHER

## 2021-04-27 ENCOUNTER — TELEPHONE (OUTPATIENT)
Dept: FAMILY MEDICINE | Facility: CLINIC | Age: 86
End: 2021-04-27

## 2021-06-11 ENCOUNTER — TELEPHONE (OUTPATIENT)
Dept: FAMILY MEDICINE | Facility: CLINIC | Age: 86
End: 2021-06-11

## 2021-06-15 ENCOUNTER — OFFICE VISIT (OUTPATIENT)
Dept: FAMILY MEDICINE | Facility: CLINIC | Age: 86
End: 2021-06-15
Payer: MEDICARE

## 2021-06-15 ENCOUNTER — TELEPHONE (OUTPATIENT)
Dept: FAMILY MEDICINE | Facility: CLINIC | Age: 86
End: 2021-06-15

## 2021-06-15 VITALS
WEIGHT: 163 LBS | BODY MASS INDEX: 24.71 KG/M2 | HEART RATE: 76 BPM | SYSTOLIC BLOOD PRESSURE: 138 MMHG | HEIGHT: 68 IN | DIASTOLIC BLOOD PRESSURE: 70 MMHG

## 2021-06-15 DIAGNOSIS — E78.00 HYPERCHOLESTEREMIA: Chronic | ICD-10-CM

## 2021-06-15 DIAGNOSIS — I70.0 AORTIC ATHEROSCLEROSIS: ICD-10-CM

## 2021-06-15 DIAGNOSIS — E78.5 DYSLIPIDEMIA: ICD-10-CM

## 2021-06-15 DIAGNOSIS — N18.30 STAGE 3 CHRONIC KIDNEY DISEASE, UNSPECIFIED WHETHER STAGE 3A OR 3B CKD: ICD-10-CM

## 2021-06-15 DIAGNOSIS — Z85.46 HISTORY OF PROSTATE CANCER: ICD-10-CM

## 2021-06-15 DIAGNOSIS — K21.9 GASTROESOPHAGEAL REFLUX DISEASE WITHOUT ESOPHAGITIS: ICD-10-CM

## 2021-06-15 DIAGNOSIS — E03.9 ACQUIRED HYPOTHYROIDISM: ICD-10-CM

## 2021-06-15 DIAGNOSIS — I25.10 CORONARY ARTERY DISEASE DUE TO CALCIFIED CORONARY LESION: Primary | ICD-10-CM

## 2021-06-15 DIAGNOSIS — K52.0 RADIATION COLITIS: Chronic | ICD-10-CM

## 2021-06-15 DIAGNOSIS — M51.36 DDD (DEGENERATIVE DISC DISEASE), LUMBAR: ICD-10-CM

## 2021-06-15 DIAGNOSIS — I77.811 AORTIC ECTASIA, ABDOMINAL: ICD-10-CM

## 2021-06-15 DIAGNOSIS — G47.9 SLEEP DISORDER: Chronic | ICD-10-CM

## 2021-06-15 DIAGNOSIS — I25.84 CORONARY ARTERY DISEASE DUE TO CALCIFIED CORONARY LESION: Primary | ICD-10-CM

## 2021-06-15 DIAGNOSIS — K58.1 IRRITABLE BOWEL SYNDROME WITH CONSTIPATION: ICD-10-CM

## 2021-06-15 PROCEDURE — 1101F PR PT FALLS ASSESS DOC 0-1 FALLS W/OUT INJ PAST YR: ICD-10-PCS | Mod: S$GLB,,, | Performed by: FAMILY MEDICINE

## 2021-06-15 PROCEDURE — 1159F PR MEDICATION LIST DOCUMENTED IN MEDICAL RECORD: ICD-10-PCS | Mod: S$GLB,,, | Performed by: FAMILY MEDICINE

## 2021-06-15 PROCEDURE — 1159F MED LIST DOCD IN RCRD: CPT | Mod: S$GLB,,, | Performed by: FAMILY MEDICINE

## 2021-06-15 PROCEDURE — 99204 PR OFFICE/OUTPT VISIT, NEW, LEVL IV, 45-59 MIN: ICD-10-PCS | Mod: S$GLB,,, | Performed by: FAMILY MEDICINE

## 2021-06-15 PROCEDURE — 3288F FALL RISK ASSESSMENT DOCD: CPT | Mod: S$GLB,,, | Performed by: FAMILY MEDICINE

## 2021-06-15 PROCEDURE — 1101F PT FALLS ASSESS-DOCD LE1/YR: CPT | Mod: S$GLB,,, | Performed by: FAMILY MEDICINE

## 2021-06-15 PROCEDURE — 99204 OFFICE O/P NEW MOD 45 MIN: CPT | Mod: S$GLB,,, | Performed by: FAMILY MEDICINE

## 2021-06-15 PROCEDURE — 3288F PR FALLS RISK ASSESSMENT DOCUMENTED: ICD-10-PCS | Mod: S$GLB,,, | Performed by: FAMILY MEDICINE

## 2021-06-15 RX ORDER — CLOPIDOGREL BISULFATE 75 MG/1
75 TABLET ORAL DAILY
Qty: 90 TABLET | Refills: 1 | Status: SHIPPED | OUTPATIENT
Start: 2021-06-15 | End: 2022-01-03 | Stop reason: SDUPTHER

## 2021-06-15 RX ORDER — ESOMEPRAZOLE MAGNESIUM 40 MG/1
CAPSULE, DELAYED RELEASE ORAL
Qty: 90 CAPSULE | Refills: 1 | Status: SHIPPED | OUTPATIENT
Start: 2021-06-15 | End: 2022-09-02 | Stop reason: SDUPTHER

## 2021-06-15 RX ORDER — CLOPIDOGREL BISULFATE 75 MG/1
75 TABLET ORAL DAILY
COMMUNITY
End: 2021-06-15 | Stop reason: SDUPTHER

## 2021-06-15 RX ORDER — ROSUVASTATIN CALCIUM 40 MG/1
40 TABLET, COATED ORAL DAILY
Qty: 90 TABLET | Refills: 3 | Status: SHIPPED | OUTPATIENT
Start: 2021-06-15 | End: 2022-01-03 | Stop reason: SDUPTHER

## 2021-06-15 RX ORDER — LEVOTHYROXINE SODIUM 75 UG/1
75 TABLET ORAL DAILY
Qty: 90 TABLET | Refills: 3 | Status: SHIPPED | OUTPATIENT
Start: 2021-06-15 | End: 2022-01-03 | Stop reason: SDUPTHER

## 2021-06-16 LAB
ALBUMIN SERPL-MCNC: 4.4 G/DL (ref 3.6–5.1)
ALBUMIN/GLOB SERPL: 1.6 (CALC) (ref 1–2.5)
ALP SERPL-CCNC: 60 U/L (ref 35–144)
ALT SERPL-CCNC: 21 U/L (ref 9–46)
AST SERPL-CCNC: 26 U/L (ref 10–35)
BASOPHILS # BLD AUTO: 29 CELLS/UL (ref 0–200)
BASOPHILS NFR BLD AUTO: 0.6 %
BILIRUB SERPL-MCNC: 0.6 MG/DL (ref 0.2–1.2)
BUN SERPL-MCNC: 21 MG/DL (ref 7–25)
BUN/CREAT SERPL: ABNORMAL (CALC) (ref 6–22)
CALCIUM SERPL-MCNC: 9.8 MG/DL (ref 8.6–10.3)
CHLORIDE SERPL-SCNC: 104 MMOL/L (ref 98–110)
CHOLEST SERPL-MCNC: 153 MG/DL
CHOLEST/HDLC SERPL: 3 (CALC)
CO2 SERPL-SCNC: 31 MMOL/L (ref 20–32)
CREAT SERPL-MCNC: 1.1 MG/DL (ref 0.7–1.11)
EOSINOPHIL # BLD AUTO: 69 CELLS/UL (ref 15–500)
EOSINOPHIL NFR BLD AUTO: 1.4 %
ERYTHROCYTE [DISTWIDTH] IN BLOOD BY AUTOMATED COUNT: 12.8 % (ref 11–15)
GLOBULIN SER CALC-MCNC: 2.8 G/DL (CALC) (ref 1.9–3.7)
GLUCOSE SERPL-MCNC: 107 MG/DL (ref 65–99)
HCT VFR BLD AUTO: 42.8 % (ref 38.5–50)
HDLC SERPL-MCNC: 51 MG/DL
HGB BLD-MCNC: 14.7 G/DL (ref 13.2–17.1)
LDLC SERPL CALC-MCNC: 82 MG/DL (CALC)
LYMPHOCYTES # BLD AUTO: 1171 CELLS/UL (ref 850–3900)
LYMPHOCYTES NFR BLD AUTO: 23.9 %
MCH RBC QN AUTO: 32.1 PG (ref 27–33)
MCHC RBC AUTO-ENTMCNC: 34.3 G/DL (ref 32–36)
MCV RBC AUTO: 93.4 FL (ref 80–100)
MONOCYTES # BLD AUTO: 421 CELLS/UL (ref 200–950)
MONOCYTES NFR BLD AUTO: 8.6 %
NEUTROPHILS # BLD AUTO: 3210 CELLS/UL (ref 1500–7800)
NEUTROPHILS NFR BLD AUTO: 65.5 %
NONHDLC SERPL-MCNC: 102 MG/DL (CALC)
PLATELET # BLD AUTO: 195 THOUSAND/UL (ref 140–400)
PMV BLD REES-ECKER: 9 FL (ref 7.5–12.5)
POTASSIUM SERPL-SCNC: 4.4 MMOL/L (ref 3.5–5.3)
PROT SERPL-MCNC: 7.2 G/DL (ref 6.1–8.1)
RBC # BLD AUTO: 4.58 MILLION/UL (ref 4.2–5.8)
SODIUM SERPL-SCNC: 139 MMOL/L (ref 135–146)
TRIGL SERPL-MCNC: 107 MG/DL
TSH SERPL-ACNC: 3.5 MIU/L (ref 0.4–4.5)
WBC # BLD AUTO: 4.9 THOUSAND/UL (ref 3.8–10.8)

## 2021-06-21 ENCOUNTER — TELEPHONE (OUTPATIENT)
Dept: FAMILY MEDICINE | Facility: CLINIC | Age: 86
End: 2021-06-21

## 2021-09-14 DIAGNOSIS — M25.551 BILATERAL HIP PAIN: Primary | ICD-10-CM

## 2021-09-14 DIAGNOSIS — M25.552 BILATERAL HIP PAIN: Primary | ICD-10-CM

## 2021-09-15 ENCOUNTER — HOSPITAL ENCOUNTER (OUTPATIENT)
Dept: RADIOLOGY | Facility: HOSPITAL | Age: 86
Discharge: HOME | End: 2021-09-15
Attending: ORTHOPAEDIC SURGERY
Payer: MEDICARE

## 2021-09-15 ENCOUNTER — OFFICE VISIT (OUTPATIENT)
Dept: ORTHOPEDICS | Facility: CLINIC | Age: 86
End: 2021-09-15
Payer: MEDICARE

## 2021-09-15 VITALS — HEIGHT: 68 IN | BODY MASS INDEX: 24.71 KG/M2 | WEIGHT: 163 LBS

## 2021-09-15 DIAGNOSIS — M25.551 BILATERAL HIP PAIN: ICD-10-CM

## 2021-09-15 DIAGNOSIS — M47.26 OSTEOARTHRITIS OF SPINE WITH RADICULOPATHY, LUMBAR REGION: ICD-10-CM

## 2021-09-15 DIAGNOSIS — M25.552 BILATERAL HIP PAIN: ICD-10-CM

## 2021-09-15 DIAGNOSIS — M25.551 RIGHT HIP PAIN: Primary | ICD-10-CM

## 2021-09-15 PROCEDURE — 99213 PR OFFICE/OUTPT VISIT, EST, LEVL III, 20-29 MIN: ICD-10-PCS | Mod: S$GLB,,, | Performed by: ORTHOPAEDIC SURGERY

## 2021-09-15 PROCEDURE — 3288F FALL RISK ASSESSMENT DOCD: CPT | Mod: CPTII,S$GLB,, | Performed by: ORTHOPAEDIC SURGERY

## 2021-09-15 PROCEDURE — 73521 X-RAY EXAM HIPS BI 2 VIEWS: CPT | Mod: TC,PO

## 2021-09-15 PROCEDURE — 1125F PR PAIN SEVERITY QUANTIFIED, PAIN PRESENT: ICD-10-PCS | Mod: CPTII,S$GLB,, | Performed by: ORTHOPAEDIC SURGERY

## 2021-09-15 PROCEDURE — 73521 XR HIPS BILATERAL 2 VIEW INCL AP PELVIS: ICD-10-PCS | Mod: 26,,, | Performed by: RADIOLOGY

## 2021-09-15 PROCEDURE — 99999 PR PBB SHADOW E&M-EST. PATIENT-LVL IV: ICD-10-PCS | Mod: PBBFAC,,, | Performed by: ORTHOPAEDIC SURGERY

## 2021-09-15 PROCEDURE — 1101F PT FALLS ASSESS-DOCD LE1/YR: CPT | Mod: CPTII,S$GLB,, | Performed by: ORTHOPAEDIC SURGERY

## 2021-09-15 PROCEDURE — 73521 X-RAY EXAM HIPS BI 2 VIEWS: CPT | Mod: 26,,, | Performed by: RADIOLOGY

## 2021-09-15 PROCEDURE — 1159F PR MEDICATION LIST DOCUMENTED IN MEDICAL RECORD: ICD-10-PCS | Mod: CPTII,S$GLB,, | Performed by: ORTHOPAEDIC SURGERY

## 2021-09-15 PROCEDURE — 3288F PR FALLS RISK ASSESSMENT DOCUMENTED: ICD-10-PCS | Mod: CPTII,S$GLB,, | Performed by: ORTHOPAEDIC SURGERY

## 2021-09-15 PROCEDURE — 1101F PR PT FALLS ASSESS DOC 0-1 FALLS W/OUT INJ PAST YR: ICD-10-PCS | Mod: CPTII,S$GLB,, | Performed by: ORTHOPAEDIC SURGERY

## 2021-09-15 PROCEDURE — 99213 OFFICE O/P EST LOW 20 MIN: CPT | Mod: S$GLB,,, | Performed by: ORTHOPAEDIC SURGERY

## 2021-09-15 PROCEDURE — 1125F AMNT PAIN NOTED PAIN PRSNT: CPT | Mod: CPTII,S$GLB,, | Performed by: ORTHOPAEDIC SURGERY

## 2021-09-15 PROCEDURE — 1160F RVW MEDS BY RX/DR IN RCRD: CPT | Mod: CPTII,S$GLB,, | Performed by: ORTHOPAEDIC SURGERY

## 2021-09-15 PROCEDURE — 1159F MED LIST DOCD IN RCRD: CPT | Mod: CPTII,S$GLB,, | Performed by: ORTHOPAEDIC SURGERY

## 2021-09-15 PROCEDURE — 1160F PR REVIEW ALL MEDS BY PRESCRIBER/CLIN PHARMACIST DOCUMENTED: ICD-10-PCS | Mod: CPTII,S$GLB,, | Performed by: ORTHOPAEDIC SURGERY

## 2021-09-15 PROCEDURE — 99999 PR PBB SHADOW E&M-EST. PATIENT-LVL IV: CPT | Mod: PBBFAC,,, | Performed by: ORTHOPAEDIC SURGERY

## 2021-09-24 ENCOUNTER — CLINICAL SUPPORT (OUTPATIENT)
Dept: REHABILITATION | Facility: HOSPITAL | Age: 86
End: 2021-09-24
Payer: MEDICARE

## 2021-09-24 DIAGNOSIS — M47.26 OSTEOARTHRITIS OF SPINE WITH RADICULOPATHY, LUMBAR REGION: ICD-10-CM

## 2021-09-24 DIAGNOSIS — M54.16 LUMBAR RADICULOPATHY: ICD-10-CM

## 2021-09-24 DIAGNOSIS — M54.42 ACUTE LEFT-SIDED LOW BACK PAIN WITH LEFT-SIDED SCIATICA: ICD-10-CM

## 2021-09-24 PROBLEM — M54.50 LOW BACK PAIN: Status: ACTIVE | Noted: 2021-09-24

## 2021-09-24 PROCEDURE — 97161 PT EVAL LOW COMPLEX 20 MIN: CPT | Mod: PN

## 2021-09-24 PROCEDURE — 97110 THERAPEUTIC EXERCISES: CPT | Mod: PN

## 2021-09-29 ENCOUNTER — CLINICAL SUPPORT (OUTPATIENT)
Dept: REHABILITATION | Facility: HOSPITAL | Age: 86
End: 2021-09-29
Payer: MEDICARE

## 2021-09-29 DIAGNOSIS — M54.42 ACUTE LEFT-SIDED LOW BACK PAIN WITH LEFT-SIDED SCIATICA: Primary | ICD-10-CM

## 2021-09-29 DIAGNOSIS — M54.16 LUMBAR RADICULOPATHY: ICD-10-CM

## 2021-09-29 PROCEDURE — 97140 MANUAL THERAPY 1/> REGIONS: CPT | Mod: PN,CQ

## 2021-09-29 PROCEDURE — 97110 THERAPEUTIC EXERCISES: CPT | Mod: PN,CQ

## 2021-10-01 ENCOUNTER — CLINICAL SUPPORT (OUTPATIENT)
Dept: REHABILITATION | Facility: HOSPITAL | Age: 86
End: 2021-10-01
Payer: MEDICARE

## 2021-10-01 DIAGNOSIS — M54.42 ACUTE LEFT-SIDED LOW BACK PAIN WITH LEFT-SIDED SCIATICA: ICD-10-CM

## 2021-10-01 DIAGNOSIS — M54.16 LUMBAR RADICULOPATHY: ICD-10-CM

## 2021-10-01 DIAGNOSIS — M51.36 DDD (DEGENERATIVE DISC DISEASE), LUMBAR: ICD-10-CM

## 2021-10-01 PROCEDURE — 97110 THERAPEUTIC EXERCISES: CPT | Mod: PN,CQ

## 2021-10-05 ENCOUNTER — CLINICAL SUPPORT (OUTPATIENT)
Dept: REHABILITATION | Facility: HOSPITAL | Age: 86
End: 2021-10-05
Payer: MEDICARE

## 2021-10-05 DIAGNOSIS — M54.16 LUMBAR RADICULOPATHY: ICD-10-CM

## 2021-10-05 DIAGNOSIS — M54.42 ACUTE LEFT-SIDED LOW BACK PAIN WITH LEFT-SIDED SCIATICA: ICD-10-CM

## 2021-10-05 DIAGNOSIS — M51.36 DDD (DEGENERATIVE DISC DISEASE), LUMBAR: ICD-10-CM

## 2021-10-05 DIAGNOSIS — M47.26 OSTEOARTHRITIS OF SPINE WITH RADICULOPATHY, LUMBAR REGION: Primary | ICD-10-CM

## 2021-10-05 PROCEDURE — 97110 THERAPEUTIC EXERCISES: CPT | Mod: PN

## 2021-10-05 PROCEDURE — 97112 NEUROMUSCULAR REEDUCATION: CPT | Mod: PN

## 2021-10-08 ENCOUNTER — CLINICAL SUPPORT (OUTPATIENT)
Dept: REHABILITATION | Facility: HOSPITAL | Age: 86
End: 2021-10-08
Payer: MEDICARE

## 2021-10-08 DIAGNOSIS — M51.36 DDD (DEGENERATIVE DISC DISEASE), LUMBAR: ICD-10-CM

## 2021-10-08 DIAGNOSIS — M54.16 LUMBAR RADICULOPATHY: ICD-10-CM

## 2021-10-08 DIAGNOSIS — M54.42 ACUTE LEFT-SIDED LOW BACK PAIN WITH LEFT-SIDED SCIATICA: ICD-10-CM

## 2021-10-08 PROCEDURE — 97110 THERAPEUTIC EXERCISES: CPT | Mod: PN,CQ

## 2021-10-12 ENCOUNTER — CLINICAL SUPPORT (OUTPATIENT)
Dept: REHABILITATION | Facility: HOSPITAL | Age: 86
End: 2021-10-12
Payer: MEDICARE

## 2021-10-12 DIAGNOSIS — M54.42 ACUTE LEFT-SIDED LOW BACK PAIN WITH LEFT-SIDED SCIATICA: ICD-10-CM

## 2021-10-12 DIAGNOSIS — M51.36 DDD (DEGENERATIVE DISC DISEASE), LUMBAR: ICD-10-CM

## 2021-10-12 DIAGNOSIS — M54.16 LUMBAR RADICULOPATHY: ICD-10-CM

## 2021-10-12 PROCEDURE — 97110 THERAPEUTIC EXERCISES: CPT | Mod: PN,CQ

## 2021-10-14 ENCOUNTER — CLINICAL SUPPORT (OUTPATIENT)
Dept: REHABILITATION | Facility: HOSPITAL | Age: 86
End: 2021-10-14
Payer: MEDICARE

## 2021-10-14 DIAGNOSIS — M54.16 LUMBAR RADICULOPATHY: Primary | ICD-10-CM

## 2021-10-14 DIAGNOSIS — R26.89 IMBALANCE: ICD-10-CM

## 2021-10-14 DIAGNOSIS — M54.42 ACUTE LEFT-SIDED LOW BACK PAIN WITH LEFT-SIDED SCIATICA: ICD-10-CM

## 2021-10-14 DIAGNOSIS — M51.36 DDD (DEGENERATIVE DISC DISEASE), LUMBAR: ICD-10-CM

## 2021-10-14 PROCEDURE — 97112 NEUROMUSCULAR REEDUCATION: CPT | Mod: PN

## 2021-10-14 PROCEDURE — 97110 THERAPEUTIC EXERCISES: CPT | Mod: PN

## 2021-10-19 ENCOUNTER — CLINICAL SUPPORT (OUTPATIENT)
Dept: REHABILITATION | Facility: HOSPITAL | Age: 86
End: 2021-10-19
Payer: MEDICARE

## 2021-10-19 DIAGNOSIS — M51.36 DDD (DEGENERATIVE DISC DISEASE), LUMBAR: ICD-10-CM

## 2021-10-19 DIAGNOSIS — M54.16 LUMBAR RADICULOPATHY: ICD-10-CM

## 2021-10-19 DIAGNOSIS — R26.89 IMBALANCE: ICD-10-CM

## 2021-10-19 DIAGNOSIS — M54.42 ACUTE LEFT-SIDED LOW BACK PAIN WITH LEFT-SIDED SCIATICA: ICD-10-CM

## 2021-10-19 PROCEDURE — 97112 NEUROMUSCULAR REEDUCATION: CPT | Mod: PN,CQ

## 2021-10-19 PROCEDURE — 97110 THERAPEUTIC EXERCISES: CPT | Mod: PN,CQ

## 2021-10-21 ENCOUNTER — CLINICAL SUPPORT (OUTPATIENT)
Dept: REHABILITATION | Facility: HOSPITAL | Age: 86
End: 2021-10-21
Payer: MEDICARE

## 2021-10-21 DIAGNOSIS — R26.89 IMBALANCE: ICD-10-CM

## 2021-10-21 DIAGNOSIS — M51.36 DDD (DEGENERATIVE DISC DISEASE), LUMBAR: ICD-10-CM

## 2021-10-21 DIAGNOSIS — M54.42 ACUTE LEFT-SIDED LOW BACK PAIN WITH LEFT-SIDED SCIATICA: ICD-10-CM

## 2021-10-21 DIAGNOSIS — M54.16 LUMBAR RADICULOPATHY: ICD-10-CM

## 2021-10-21 PROCEDURE — 97112 NEUROMUSCULAR REEDUCATION: CPT | Mod: PN,CQ

## 2021-10-21 PROCEDURE — 97110 THERAPEUTIC EXERCISES: CPT | Mod: PN,CQ

## 2021-10-26 ENCOUNTER — CLINICAL SUPPORT (OUTPATIENT)
Dept: REHABILITATION | Facility: HOSPITAL | Age: 86
End: 2021-10-26
Payer: MEDICARE

## 2021-10-26 DIAGNOSIS — M47.26 OSTEOARTHRITIS OF SPINE WITH RADICULOPATHY, LUMBAR REGION: Primary | ICD-10-CM

## 2021-10-26 DIAGNOSIS — M54.42 ACUTE LEFT-SIDED LOW BACK PAIN WITH LEFT-SIDED SCIATICA: ICD-10-CM

## 2021-10-26 DIAGNOSIS — R26.89 IMBALANCE: ICD-10-CM

## 2021-10-26 DIAGNOSIS — M54.16 LUMBAR RADICULOPATHY: ICD-10-CM

## 2021-10-26 DIAGNOSIS — M51.36 DDD (DEGENERATIVE DISC DISEASE), LUMBAR: ICD-10-CM

## 2021-10-26 PROCEDURE — 97112 NEUROMUSCULAR REEDUCATION: CPT | Mod: PN

## 2021-10-26 PROCEDURE — 97110 THERAPEUTIC EXERCISES: CPT | Mod: PN

## 2021-10-28 ENCOUNTER — CLINICAL SUPPORT (OUTPATIENT)
Dept: REHABILITATION | Facility: HOSPITAL | Age: 86
End: 2021-10-28
Payer: MEDICARE

## 2021-10-28 DIAGNOSIS — M51.36 DDD (DEGENERATIVE DISC DISEASE), LUMBAR: ICD-10-CM

## 2021-10-28 DIAGNOSIS — R26.89 IMBALANCE: Primary | ICD-10-CM

## 2021-10-28 DIAGNOSIS — M54.16 LUMBAR RADICULOPATHY: ICD-10-CM

## 2021-10-28 DIAGNOSIS — M54.42 ACUTE LEFT-SIDED LOW BACK PAIN WITH LEFT-SIDED SCIATICA: ICD-10-CM

## 2021-10-28 PROCEDURE — 97112 NEUROMUSCULAR REEDUCATION: CPT | Mod: PN

## 2021-10-28 PROCEDURE — 97110 THERAPEUTIC EXERCISES: CPT | Mod: PN

## 2021-11-02 ENCOUNTER — CLINICAL SUPPORT (OUTPATIENT)
Dept: REHABILITATION | Facility: HOSPITAL | Age: 86
End: 2021-11-02
Payer: MEDICARE

## 2021-11-02 DIAGNOSIS — R26.89 IMBALANCE: Primary | ICD-10-CM

## 2021-11-02 DIAGNOSIS — M54.42 ACUTE LEFT-SIDED LOW BACK PAIN WITH LEFT-SIDED SCIATICA: ICD-10-CM

## 2021-11-02 DIAGNOSIS — M51.36 DDD (DEGENERATIVE DISC DISEASE), LUMBAR: ICD-10-CM

## 2021-11-02 DIAGNOSIS — M54.16 LUMBAR RADICULOPATHY: ICD-10-CM

## 2021-11-02 PROCEDURE — 97110 THERAPEUTIC EXERCISES: CPT | Mod: PN

## 2021-11-02 PROCEDURE — 97112 NEUROMUSCULAR REEDUCATION: CPT | Mod: PN

## 2021-11-21 ENCOUNTER — HOSPITAL ENCOUNTER (EMERGENCY)
Facility: HOSPITAL | Age: 86
Discharge: HOME | End: 2021-11-21
Attending: EMERGENCY MEDICINE
Payer: MEDICARE

## 2021-11-21 VITALS
BODY MASS INDEX: 24.25 KG/M2 | WEIGHT: 160 LBS | DIASTOLIC BLOOD PRESSURE: 60 MMHG | TEMPERATURE: 98 F | HEIGHT: 68 IN | SYSTOLIC BLOOD PRESSURE: 138 MMHG | RESPIRATION RATE: 20 BRPM | HEART RATE: 91 BPM | OXYGEN SATURATION: 98 %

## 2021-11-21 DIAGNOSIS — N20.0 KIDNEY STONE: Primary | ICD-10-CM

## 2021-11-21 DIAGNOSIS — N39.0 URINARY TRACT INFECTION WITHOUT HEMATURIA, SITE UNSPECIFIED: ICD-10-CM

## 2021-11-21 LAB
ALBUMIN SERPL BCP-MCNC: 4.2 G/DL (ref 3.5–5.2)
ALP SERPL-CCNC: 62 U/L (ref 55–135)
ALT SERPL W/O P-5'-P-CCNC: 26 U/L (ref 10–44)
ANION GAP SERPL CALC-SCNC: 9 MMOL/L (ref 8–16)
AST SERPL-CCNC: 34 U/L (ref 10–40)
BACTERIA #/AREA URNS HPF: ABNORMAL /HPF
BASOPHILS # BLD AUTO: 0.01 K/UL (ref 0–0.2)
BASOPHILS NFR BLD: 0.1 % (ref 0–1.9)
BILIRUB SERPL-MCNC: 1.2 MG/DL (ref 0.1–1)
BILIRUB UR QL STRIP: NEGATIVE
BUN SERPL-MCNC: 14 MG/DL (ref 10–30)
CALCIUM SERPL-MCNC: 9.1 MG/DL (ref 8.7–10.5)
CHLORIDE SERPL-SCNC: 100 MMOL/L (ref 95–110)
CLARITY UR: ABNORMAL
CO2 SERPL-SCNC: 24 MMOL/L (ref 23–29)
COLOR UR: YELLOW
CREAT SERPL-MCNC: 1 MG/DL (ref 0.5–1.4)
DIFFERENTIAL METHOD: ABNORMAL
EOSINOPHIL # BLD AUTO: 0 K/UL (ref 0–0.5)
EOSINOPHIL NFR BLD: 0.2 % (ref 0–8)
ERYTHROCYTE [DISTWIDTH] IN BLOOD BY AUTOMATED COUNT: 13.8 % (ref 11.5–14.5)
EST. GFR  (AFRICAN AMERICAN): >60 ML/MIN/1.73 M^2
EST. GFR  (NON AFRICAN AMERICAN): >60 ML/MIN/1.73 M^2
GLUCOSE SERPL-MCNC: 113 MG/DL (ref 70–110)
GLUCOSE UR QL STRIP: NEGATIVE
HCT VFR BLD AUTO: 41.1 % (ref 40–54)
HGB BLD-MCNC: 14.2 G/DL (ref 14–18)
HGB UR QL STRIP: ABNORMAL
HYALINE CASTS #/AREA URNS LPF: 71 /LPF
IMM GRANULOCYTES # BLD AUTO: 0.03 K/UL (ref 0–0.04)
IMM GRANULOCYTES NFR BLD AUTO: 0.3 % (ref 0–0.5)
KETONES UR QL STRIP: NEGATIVE
LEUKOCYTE ESTERASE UR QL STRIP: ABNORMAL
LYMPHOCYTES # BLD AUTO: 0.8 K/UL (ref 1–4.8)
LYMPHOCYTES NFR BLD: 7.3 % (ref 18–48)
MCH RBC QN AUTO: 31.3 PG (ref 27–31)
MCHC RBC AUTO-ENTMCNC: 34.5 G/DL (ref 32–36)
MCV RBC AUTO: 91 FL (ref 82–98)
MICROSCOPIC COMMENT: ABNORMAL
MONOCYTES # BLD AUTO: 1 K/UL (ref 0.3–1)
MONOCYTES NFR BLD: 8.8 % (ref 4–15)
NEUTROPHILS # BLD AUTO: 9.5 K/UL (ref 1.8–7.7)
NEUTROPHILS NFR BLD: 83.3 % (ref 38–73)
NITRITE UR QL STRIP: NEGATIVE
NRBC BLD-RTO: 0 /100 WBC
PH UR STRIP: 7 [PH] (ref 5–8)
PLATELET # BLD AUTO: 180 K/UL (ref 150–450)
PMV BLD AUTO: 9 FL (ref 9.2–12.9)
POTASSIUM SERPL-SCNC: 4.3 MMOL/L (ref 3.5–5.1)
PROT SERPL-MCNC: 7.2 G/DL (ref 6–8.4)
PROT UR QL STRIP: ABNORMAL
RBC # BLD AUTO: 4.53 M/UL (ref 4.6–6.2)
RBC #/AREA URNS HPF: 8 /HPF (ref 0–4)
SODIUM SERPL-SCNC: 133 MMOL/L (ref 136–145)
SP GR UR STRIP: 1.01 (ref 1–1.03)
SQUAMOUS #/AREA URNS HPF: 0 /HPF
URN SPEC COLLECT METH UR: ABNORMAL
UROBILINOGEN UR STRIP-ACNC: NEGATIVE EU/DL
WBC # BLD AUTO: 11.41 K/UL (ref 3.9–12.7)
WBC #/AREA URNS HPF: >100 /HPF (ref 0–5)

## 2021-11-21 PROCEDURE — 25000003 PHARM REV CODE 250: Performed by: NURSE PRACTITIONER

## 2021-11-21 PROCEDURE — 87086 URINE CULTURE/COLONY COUNT: CPT | Performed by: NURSE PRACTITIONER

## 2021-11-21 PROCEDURE — 63600175 PHARM REV CODE 636 W HCPCS: Performed by: NURSE PRACTITIONER

## 2021-11-21 PROCEDURE — 96375 TX/PRO/DX INJ NEW DRUG ADDON: CPT

## 2021-11-21 PROCEDURE — 85025 COMPLETE CBC W/AUTO DIFF WBC: CPT | Performed by: NURSE PRACTITIONER

## 2021-11-21 PROCEDURE — 87186 SC STD MICRODIL/AGAR DIL: CPT | Performed by: NURSE PRACTITIONER

## 2021-11-21 PROCEDURE — 63600175 PHARM REV CODE 636 W HCPCS: Performed by: EMERGENCY MEDICINE

## 2021-11-21 PROCEDURE — 99284 EMERGENCY DEPT VISIT MOD MDM: CPT | Mod: 25

## 2021-11-21 PROCEDURE — 81001 URINALYSIS AUTO W/SCOPE: CPT | Performed by: NURSE PRACTITIONER

## 2021-11-21 PROCEDURE — 96365 THER/PROPH/DIAG IV INF INIT: CPT

## 2021-11-21 PROCEDURE — 96361 HYDRATE IV INFUSION ADD-ON: CPT | Mod: GZ

## 2021-11-21 PROCEDURE — 80053 COMPREHEN METABOLIC PANEL: CPT | Performed by: NURSE PRACTITIONER

## 2021-11-21 PROCEDURE — 87077 CULTURE AEROBIC IDENTIFY: CPT | Performed by: NURSE PRACTITIONER

## 2021-11-21 PROCEDURE — 36415 COLL VENOUS BLD VENIPUNCTURE: CPT | Performed by: NURSE PRACTITIONER

## 2021-11-21 RX ORDER — HYDROCODONE BITARTRATE AND ACETAMINOPHEN 5; 325 MG/1; MG/1
1 TABLET ORAL EVERY 4 HOURS PRN
Qty: 18 TABLET | Refills: 0 | Status: SHIPPED | OUTPATIENT
Start: 2021-11-21 | End: 2021-12-28

## 2021-11-21 RX ORDER — HYDROMORPHONE HYDROCHLORIDE 1 MG/ML
0.5 INJECTION, SOLUTION INTRAMUSCULAR; INTRAVENOUS; SUBCUTANEOUS
Status: DISCONTINUED | OUTPATIENT
Start: 2021-11-21 | End: 2021-11-21

## 2021-11-21 RX ORDER — ONDANSETRON 2 MG/ML
4 INJECTION INTRAMUSCULAR; INTRAVENOUS
Status: COMPLETED | OUTPATIENT
Start: 2021-11-21 | End: 2021-11-21

## 2021-11-21 RX ORDER — SODIUM CHLORIDE 9 MG/ML
125 INJECTION, SOLUTION INTRAVENOUS CONTINUOUS
Status: DISCONTINUED | OUTPATIENT
Start: 2021-11-21 | End: 2021-11-21 | Stop reason: HOSPADM

## 2021-11-21 RX ORDER — HYDROMORPHONE HYDROCHLORIDE 1 MG/ML
0.5 INJECTION, SOLUTION INTRAMUSCULAR; INTRAVENOUS; SUBCUTANEOUS
Status: COMPLETED | OUTPATIENT
Start: 2021-11-21 | End: 2021-11-21

## 2021-11-21 RX ORDER — CEPHALEXIN 500 MG/1
500 CAPSULE ORAL 4 TIMES DAILY
Qty: 40 CAPSULE | Refills: 0 | Status: SHIPPED | OUTPATIENT
Start: 2021-11-21 | End: 2021-12-01

## 2021-11-21 RX ADMIN — CEFTRIAXONE 1 G: 1 INJECTION, SOLUTION INTRAVENOUS at 10:11

## 2021-11-21 RX ADMIN — ONDANSETRON 4 MG: 2 INJECTION INTRAMUSCULAR; INTRAVENOUS at 10:11

## 2021-11-21 RX ADMIN — SODIUM CHLORIDE 125 ML/HR: 0.9 INJECTION, SOLUTION INTRAVENOUS at 09:11

## 2021-11-21 RX ADMIN — HYDROMORPHONE HYDROCHLORIDE 0.5 MG: 1 INJECTION, SOLUTION INTRAMUSCULAR; INTRAVENOUS; SUBCUTANEOUS at 10:11

## 2021-11-22 ENCOUNTER — TELEPHONE (OUTPATIENT)
Dept: FAMILY MEDICINE | Facility: CLINIC | Age: 86
End: 2021-11-22
Payer: MEDICARE

## 2021-11-23 LAB — BACTERIA UR CULT: ABNORMAL

## 2021-12-01 ENCOUNTER — TELEPHONE (OUTPATIENT)
Dept: FAMILY MEDICINE | Facility: CLINIC | Age: 86
End: 2021-12-01
Payer: MEDICARE

## 2021-12-03 ENCOUNTER — DOCUMENTATION ONLY (OUTPATIENT)
Dept: REHABILITATION | Facility: HOSPITAL | Age: 86
End: 2021-12-03
Payer: MEDICARE

## 2021-12-14 ENCOUNTER — OFFICE VISIT (OUTPATIENT)
Dept: FAMILY MEDICINE | Facility: CLINIC | Age: 86
End: 2021-12-14
Payer: MEDICARE

## 2021-12-14 VITALS
DIASTOLIC BLOOD PRESSURE: 72 MMHG | SYSTOLIC BLOOD PRESSURE: 136 MMHG | BODY MASS INDEX: 24.4 KG/M2 | HEART RATE: 72 BPM | WEIGHT: 161 LBS | HEIGHT: 68 IN

## 2021-12-14 DIAGNOSIS — K21.9 GASTROESOPHAGEAL REFLUX DISEASE WITHOUT ESOPHAGITIS: Chronic | ICD-10-CM

## 2021-12-14 DIAGNOSIS — I25.84 CORONARY ARTERY DISEASE DUE TO CALCIFIED CORONARY LESION: ICD-10-CM

## 2021-12-14 DIAGNOSIS — I10 ESSENTIAL HYPERTENSION: ICD-10-CM

## 2021-12-14 DIAGNOSIS — R26.89 IMBALANCE: ICD-10-CM

## 2021-12-14 DIAGNOSIS — I25.10 CORONARY ARTERY DISEASE DUE TO CALCIFIED CORONARY LESION: ICD-10-CM

## 2021-12-14 DIAGNOSIS — E78.00 HYPERCHOLESTEREMIA: Chronic | ICD-10-CM

## 2021-12-14 DIAGNOSIS — Z85.46 HISTORY OF PROSTATE CANCER: ICD-10-CM

## 2021-12-14 DIAGNOSIS — N34.2 URETHRITIS: Primary | ICD-10-CM

## 2021-12-14 DIAGNOSIS — R39.9 UTI SYMPTOMS: ICD-10-CM

## 2021-12-14 DIAGNOSIS — M54.16 LUMBAR RADICULOPATHY: ICD-10-CM

## 2021-12-14 DIAGNOSIS — M51.36 DDD (DEGENERATIVE DISC DISEASE), LUMBAR: ICD-10-CM

## 2021-12-14 LAB
BILIRUB UR QL STRIP: NEGATIVE
GLUCOSE UR QL STRIP: NEGATIVE
KETONES UR QL STRIP: NEGATIVE
LEUKOCYTE ESTERASE UR QL STRIP: NEGATIVE
PH, POC UA: 6
POC BLOOD, URINE: NEGATIVE
POC NITRATES, URINE: NEGATIVE
PROT UR QL STRIP: NEGATIVE
SP GR UR STRIP: 1.02 (ref 1–1.03)
UROBILINOGEN UR STRIP-ACNC: NORMAL (ref 0.3–2.2)

## 2021-12-14 PROCEDURE — 81003 POCT URINALYSIS, DIPSTICK, AUTOMATED, W/O SCOPE: ICD-10-PCS | Mod: QW,S$GLB,, | Performed by: FAMILY MEDICINE

## 2021-12-14 PROCEDURE — 99213 OFFICE O/P EST LOW 20 MIN: CPT | Mod: 25,S$GLB,, | Performed by: FAMILY MEDICINE

## 2021-12-14 PROCEDURE — 99213 PR OFFICE/OUTPT VISIT, EST, LEVL III, 20-29 MIN: ICD-10-PCS | Mod: 25,S$GLB,, | Performed by: FAMILY MEDICINE

## 2021-12-14 PROCEDURE — 81003 URINALYSIS AUTO W/O SCOPE: CPT | Mod: QW,S$GLB,, | Performed by: FAMILY MEDICINE

## 2021-12-14 RX ORDER — CIPROFLOXACIN 500 MG/1
500 TABLET ORAL 2 TIMES DAILY
Qty: 14 TABLET | Refills: 0 | Status: SHIPPED | OUTPATIENT
Start: 2021-12-14 | End: 2021-12-28

## 2021-12-16 LAB — BACTERIA UR CULT: ABNORMAL

## 2021-12-17 ENCOUNTER — TELEPHONE (OUTPATIENT)
Dept: FAMILY MEDICINE | Facility: CLINIC | Age: 86
End: 2021-12-17
Payer: MEDICARE

## 2021-12-20 ENCOUNTER — TELEPHONE (OUTPATIENT)
Dept: FAMILY MEDICINE | Facility: CLINIC | Age: 86
End: 2021-12-20
Payer: MEDICARE

## 2022-01-03 DIAGNOSIS — I25.10 CORONARY ARTERY DISEASE DUE TO CALCIFIED CORONARY LESION: ICD-10-CM

## 2022-01-03 DIAGNOSIS — I77.811 AORTIC ECTASIA, ABDOMINAL: ICD-10-CM

## 2022-01-03 DIAGNOSIS — I25.84 CORONARY ARTERY DISEASE DUE TO CALCIFIED CORONARY LESION: ICD-10-CM

## 2022-01-03 DIAGNOSIS — E03.9 ACQUIRED HYPOTHYROIDISM: ICD-10-CM

## 2022-01-03 DIAGNOSIS — I70.0 AORTIC ATHEROSCLEROSIS: ICD-10-CM

## 2022-01-03 RX ORDER — CLOPIDOGREL BISULFATE 75 MG/1
75 TABLET ORAL DAILY
Qty: 90 TABLET | Refills: 1 | Status: SHIPPED | OUTPATIENT
Start: 2022-01-03 | End: 2022-01-05 | Stop reason: SDUPTHER

## 2022-01-03 RX ORDER — LEVOTHYROXINE SODIUM 75 UG/1
75 TABLET ORAL DAILY
Qty: 90 TABLET | Refills: 3 | Status: SHIPPED | OUTPATIENT
Start: 2022-01-03 | End: 2022-01-05 | Stop reason: SDUPTHER

## 2022-01-03 RX ORDER — ROSUVASTATIN CALCIUM 40 MG/1
40 TABLET, COATED ORAL DAILY
Qty: 90 TABLET | Refills: 3 | Status: SHIPPED | OUTPATIENT
Start: 2022-01-03 | End: 2022-01-05 | Stop reason: SDUPTHER

## 2022-01-03 NOTE — TELEPHONE ENCOUNTER
----- Message from Orquidea Chandler sent at 1/3/2022 12:51 PM CST -----  Pt calling for refills on Crestor 40 mg , Plavix 75 mg, Synthroid 75 mcg, and Donepeiz Hcl 10 mg sol   [facility] he wants a qty of 90 on all the scripts.  [phone]

## 2022-01-05 DIAGNOSIS — E03.9 ACQUIRED HYPOTHYROIDISM: ICD-10-CM

## 2022-01-05 DIAGNOSIS — I77.811 AORTIC ECTASIA, ABDOMINAL: ICD-10-CM

## 2022-01-05 DIAGNOSIS — I70.0 AORTIC ATHEROSCLEROSIS: ICD-10-CM

## 2022-01-05 DIAGNOSIS — I25.84 CORONARY ARTERY DISEASE DUE TO CALCIFIED CORONARY LESION: ICD-10-CM

## 2022-01-05 DIAGNOSIS — I25.10 CORONARY ARTERY DISEASE DUE TO CALCIFIED CORONARY LESION: ICD-10-CM

## 2022-01-05 RX ORDER — ROSUVASTATIN CALCIUM 40 MG/1
40 TABLET, COATED ORAL DAILY
Qty: 90 TABLET | Refills: 3 | Status: SHIPPED | OUTPATIENT
Start: 2022-01-05 | End: 2022-07-18 | Stop reason: SDUPTHER

## 2022-01-05 RX ORDER — CLOPIDOGREL BISULFATE 75 MG/1
75 TABLET ORAL DAILY
Qty: 90 TABLET | Refills: 1 | Status: SHIPPED | OUTPATIENT
Start: 2022-01-05 | End: 2022-07-18 | Stop reason: SDUPTHER

## 2022-01-05 RX ORDER — LEVOTHYROXINE SODIUM 75 UG/1
75 TABLET ORAL DAILY
Qty: 90 TABLET | Refills: 3 | Status: SHIPPED | OUTPATIENT
Start: 2022-01-05 | End: 2022-07-18 | Stop reason: SDUPTHER

## 2022-01-05 NOTE — TELEPHONE ENCOUNTER
----- Message from Irais Lomax sent at 1/5/2022  3:08 PM CST -----  Patient daughter(Gregoria ) called and stated that the patient called about a refill of his medicine and it has not been called into the pharmacy as of yet please give her a call at [phone] (she works at the pharmacy)

## 2022-01-06 RX ORDER — DONEPEZIL HYDROCHLORIDE 10 MG/1
10 TABLET, FILM COATED ORAL NIGHTLY
Qty: 30 TABLET | Refills: 5 | Status: SHIPPED | OUTPATIENT
Start: 2022-01-06 | End: 2023-08-17

## 2022-01-06 NOTE — TELEPHONE ENCOUNTER
----- Message from Irais Lomax sent at 1/6/2022  2:11 PM CST -----  Patient daughter (Bridgett)called and stated that she just picked up her father's medicine and she has a question about one of the his medicine she stated that the mg is wrong she stated that he was on 5 mg but he wanted to try the 10 mg she stated also it was called into the wrong [facility] pharmacy it was send to the [facility]'s in [place] and it need to go to the one in [place] please give her a call at [phone]

## 2022-01-12 ENCOUNTER — HOSPITAL ENCOUNTER (OUTPATIENT)
Dept: RADIOLOGY | Facility: HOSPITAL | Age: 87
Discharge: HOME | End: 2022-01-12
Attending: SPECIALIST
Payer: MEDICARE

## 2022-01-12 DIAGNOSIS — N20.1 CALCULUS OF URETER: ICD-10-CM

## 2022-01-12 DIAGNOSIS — N20.1 CALCULUS OF URETER: Primary | ICD-10-CM

## 2022-01-12 PROCEDURE — 74018 RADEX ABDOMEN 1 VIEW: CPT | Mod: TC,PO

## 2022-02-15 ENCOUNTER — OFFICE VISIT (OUTPATIENT)
Dept: FAMILY MEDICINE | Facility: CLINIC | Age: 87
End: 2022-02-15
Payer: MEDICARE

## 2022-02-15 VITALS
SYSTOLIC BLOOD PRESSURE: 128 MMHG | HEIGHT: 68 IN | BODY MASS INDEX: 23.95 KG/M2 | DIASTOLIC BLOOD PRESSURE: 70 MMHG | WEIGHT: 158 LBS | HEART RATE: 82 BPM

## 2022-02-15 DIAGNOSIS — K21.9 GASTROESOPHAGEAL REFLUX DISEASE WITHOUT ESOPHAGITIS: Chronic | ICD-10-CM

## 2022-02-15 DIAGNOSIS — N18.30 STAGE 3 CHRONIC KIDNEY DISEASE, UNSPECIFIED WHETHER STAGE 3A OR 3B CKD: Chronic | ICD-10-CM

## 2022-02-15 DIAGNOSIS — K58.1 IRRITABLE BOWEL SYNDROME WITH CONSTIPATION: Chronic | ICD-10-CM

## 2022-02-15 DIAGNOSIS — Z85.46 HISTORY OF PROSTATE CANCER: ICD-10-CM

## 2022-02-15 DIAGNOSIS — R26.89 IMBALANCE: ICD-10-CM

## 2022-02-15 DIAGNOSIS — E03.9 ACQUIRED HYPOTHYROIDISM: ICD-10-CM

## 2022-02-15 DIAGNOSIS — I25.10 CORONARY ARTERY DISEASE DUE TO CALCIFIED CORONARY LESION: ICD-10-CM

## 2022-02-15 DIAGNOSIS — I25.84 CORONARY ARTERY DISEASE DUE TO CALCIFIED CORONARY LESION: ICD-10-CM

## 2022-02-15 DIAGNOSIS — K52.0 RADIATION COLITIS: Chronic | ICD-10-CM

## 2022-02-15 DIAGNOSIS — I10 ESSENTIAL HYPERTENSION: Primary | ICD-10-CM

## 2022-02-15 DIAGNOSIS — M51.36 DDD (DEGENERATIVE DISC DISEASE), LUMBAR: ICD-10-CM

## 2022-02-15 DIAGNOSIS — E78.00 HYPERCHOLESTEREMIA: Chronic | ICD-10-CM

## 2022-02-15 PROCEDURE — 99214 OFFICE O/P EST MOD 30 MIN: CPT | Mod: S$GLB,,, | Performed by: FAMILY MEDICINE

## 2022-02-15 PROCEDURE — 1159F PR MEDICATION LIST DOCUMENTED IN MEDICAL RECORD: ICD-10-PCS | Mod: S$GLB,,, | Performed by: FAMILY MEDICINE

## 2022-02-15 PROCEDURE — 99214 PR OFFICE/OUTPT VISIT, EST, LEVL IV, 30-39 MIN: ICD-10-PCS | Mod: S$GLB,,, | Performed by: FAMILY MEDICINE

## 2022-02-15 PROCEDURE — 1159F MED LIST DOCD IN RCRD: CPT | Mod: S$GLB,,, | Performed by: FAMILY MEDICINE

## 2022-02-15 NOTE — PROGRESS NOTES
SUBJECTIVE:    Patient ID: Melvin Powers is a 89 y.o. male.    Chief Complaint: Follow-up (No bottles // F/U 3 mo //no complains //abc)    89-year-old male lives with his wife.  They 2 meals per day.  He has good mobility but his balance is somewhat off.  He still active in doing yd work.  He has had no recent falls.    Constipation-controlled with magnesium vitamins.  Has Linzess as a backup.    Cardiology -patient has 12 steps coronary stents.  He is scheduled to get an echocardiogram soon.  Takes Lasix 20 mg daily for diuretic.  No recent angina.    Prostate cancer-cured with radiation.  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    Trigger fingers improved greatly after cortisone injections.      Office Visit on 12/14/2021   Component Date Value Ref Range Status    POC Blood, Urine 12/14/2021 Negative  Negative Final    POC Bilirubin, Urine 12/14/2021 Negative  Negative Final    POC Urobilinogen, Urine 12/14/2021 neg  0.3 - 2.2 Final    POC Ketones, Urine 12/14/2021 Negative  Negative Final    POC Protein, Urine 12/14/2021 Negative  Negative Final    POC Nitrates, Urine 12/14/2021 Negative  Negative Final    POC Glucose, Urine 12/14/2021 Negative  Negative Final    pH, UA 12/14/2021 6.0   Final    POC Specific Gravity, Urine 12/14/2021 1.020  1.003 - 1.029 Final    POC Leukocytes, Urine 12/14/2021 Negative  Negative Final    Urine Culture, Routine 12/14/2021 *  Final   Admission on 11/21/2021, Discharged on 11/21/2021   Component Date Value Ref Range Status    Specimen UA 11/21/2021 Urine, Clean Catch   Final    Color, UA 11/21/2021 Yellow  Yellow, Straw, Emily Final    Appearance, UA 11/21/2021 Hazy* Clear Final    pH, UA 11/21/2021 7.0  5.0 - 8.0 Final    Specific [place], UA 11/21/2021 1.010  1.005 - 1.030 Final    Protein, UA 11/21/2021 1+* Negative Final    Glucose, UA 11/21/2021 Negative  Negative Final    Ketones, UA  11/21/2021 Negative  Negative Final    Bilirubin (UA) 11/21/2021 Negative  Negative Final    Occult Blood UA 11/21/2021 3+* Negative Final    Nitrite, UA 11/21/2021 Negative  Negative Final    Urobilinogen, UA 11/21/2021 Negative  Negative EU/dL Final    Leukocytes, UA 11/21/2021 3+* Negative Final    WBC 11/21/2021 11.41  3.90 - 12.70 K/uL Final    RBC 11/21/2021 4.53* 4.60 - 6.20 M/uL Final    Hemoglobin 11/21/2021 14.2  14.0 - 18.0 g/dL Final    Hematocrit 11/21/2021 41.1  40.0 - 54.0 % Final    MCV 11/21/2021 91  82 - 98 fL Final    MCH 11/21/2021 31.3* 27.0 - 31.0 pg Final    MCHC 11/21/2021 34.5  32.0 - 36.0 g/dL Final    RDW 11/21/2021 13.8  11.5 - 14.5 % Final    Platelets 11/21/2021 180  150 - 450 K/uL Final    MPV 11/21/2021 9.0* 9.2 - 12.9 fL Final    Immature Granulocytes 11/21/2021 0.3  0.0 - 0.5 % Final    Gran # (ANC) 11/21/2021 9.5* 1.8 - 7.7 K/uL Final    Immature Grans (Abs) 11/21/2021 0.03  0.00 - 0.04 K/uL Final    Lymph # 11/21/2021 0.8* 1.0 - 4.8 K/uL Final    Mono # 11/21/2021 1.0  0.3 - 1.0 K/uL Final    Eos # 11/21/2021 0.0  0.0 - 0.5 K/uL Final    Baso # 11/21/2021 0.01  0.00 - 0.20 K/uL Final    nRBC 11/21/2021 0  0 /100 WBC Final    Gran % 11/21/2021 83.3* 38.0 - 73.0 % Final    Lymph % 11/21/2021 7.3* 18.0 - 48.0 % Final    Mono % 11/21/2021 8.8  4.0 - 15.0 % Final    Eosinophil % 11/21/2021 0.2  0.0 - 8.0 % Final    Basophil % 11/21/2021 0.1  0.0 - 1.9 % Final    Differential Method 11/21/2021 Automated   Final    Sodium 11/21/2021 133* 136 - 145 mmol/L Final    Potassium 11/21/2021 4.3  3.5 - 5.1 mmol/L Final    Chloride 11/21/2021 100  95 - 110 mmol/L Final    CO2 11/21/2021 24  23 - 29 mmol/L Final    Glucose 11/21/2021 113* 70 - 110 mg/dL Final    BUN 11/21/2021 14  10 - 30 mg/dL Final    Creatinine 11/21/2021 1.0  0.5 - 1.4 mg/dL Final    Calcium 11/21/2021 9.1  8.7 - 10.5 mg/dL Final    Total Protein 11/21/2021 7.2  6.0 - 8.4 g/dL Final     Albumin 11/21/2021 4.2  3.5 - 5.2 g/dL Final    Total Bilirubin 11/21/2021 1.2* 0.1 - 1.0 mg/dL Final    Alkaline Phosphatase 11/21/2021 62  55 - 135 U/L Final    AST 11/21/2021 34  10 - 40 U/L Final    ALT 11/21/2021 26  10 - 44 U/L Final    Anion Gap 11/21/2021 9  8 - 16 mmol/L Final    eGFR if African American 11/21/2021 >60.0  >60 mL/min/1.73 m^2 Final    eGFR if non African American 11/21/2021 >60.0  >60 mL/min/1.73 m^2 Final    RBC, UA 11/21/2021 8* 0 - 4 /hpf Final    WBC, UA 11/21/2021 >100* 0 - 5 /hpf Final    Bacteria 11/21/2021 Occasional  None-Occ /hpf Final    Squam Epithel, UA 11/21/2021 0  /hpf Final    Hyaline Casts, UA 11/21/2021 71* 0-1/lpf /lpf Final    Microscopic Comment 11/21/2021 SEE COMMENT   Final    Urine Culture, Routine 11/21/2021 *  Final                    Value:ESCHERICHIA COLI  >100,000 cfu/ml         Past Medical History:   Diagnosis Date    Cataract     CKD (chronic kidney disease) stage 3, GFR 30-59 ml/min 3/16/2014    Coronary artery disease     GERD (gastroesophageal reflux disease)     Irritable bowel syndrome with constipation 11/30/2016     Social History     Socioeconomic History    Marital status:    Tobacco Use    Smoking status: Never Smoker    Smokeless tobacco: Never Used   Substance and Sexual Activity    Alcohol use: Yes     Alcohol/week: 2.0 standard drinks     Types: 1 Glasses of wine, 1 Cans of beer per week    Drug use: No     Past Surgical History:   Procedure Laterality Date    APPENDECTOMY      COLONOSCOPY      CYSTOSCOPY      pterygium removal Left     UPPER GASTROINTESTINAL ENDOSCOPY       Family History   Problem Relation Age of Onset    Rheum arthritis Mother     Cancer Father     Rheum arthritis Father     Cancer Sister         bone    Cancer Brother        Review of patient's allergies indicates:   Allergen Reactions    Atorvastatin Other (See Comments)     Other reaction(s): Muscle pain    Codeine      Other  "reaction(s): makes him"goofey"    Contrast media     Iodinated contrast media      Other reaction(s): Rash       Current Outpatient Medications:     cholecalciferol, vitamin D3, (VITAMIN D3) 25 mcg (1,000 unit) capsule, Take 1,000 Units by mouth once daily., Disp: , Rfl:     clopidogreL (PLAVIX) 75 mg tablet, Take 1 tablet (75 mg total) by mouth once daily., Disp: 90 tablet, Rfl: 1    coenzyme Q10 100 mg capsule, Take 200 mg by mouth once daily., Disp: , Rfl:     donepeziL (ARICEPT) 10 MG tablet, Take 1 tablet (10 mg total) by mouth every evening., Disp: 30 tablet, Rfl: 5    esomeprazole (NEXIUM) 40 MG capsule, Nexium 40 mg capsule,delayed release  Take 1 capsule every day by oral route as needed. (Patient taking differently: Nexium 40 mg capsule,delayed release  Take 1 capsule every day by oral route as needed.), Disp: 90 capsule, Rfl: 1    furosemide (LASIX) 20 MG tablet, Take 20 mg by mouth daily as needed., Disp: , Rfl:     levothyroxine (SYNTHROID) 75 MCG tablet, Take 1 tablet (75 mcg total) by mouth once daily., Disp: 90 tablet, Rfl: 3    linaCLOtide (LINZESS) 145 mcg Cap capsule, Take 1 capsule (145 mcg total) by mouth before breakfast., Disp: 90 capsule, Rfl: 1    lisinopriL 10 MG tablet, Take 0.5 tablets (5 mg total) by mouth once daily., Disp: 45 tablet, Rfl: 1    magnesium gluconate 27.5 mg (500 mg) Tab, Take 500 mg by mouth once daily., Disp: , Rfl:     meclizine (ANTIVERT) 25 mg tablet, Take 1 tablet (25 mg total) by mouth 3 (three) times daily as needed., Disp: 60 tablet, Rfl: 0    multivitamin (THERAGRAN) per tablet, Take 1 tablet by mouth once daily., Disp: 100 tablet, Rfl: 3    nitroGLYCERIN 0.1 mg/hr TD PT24 (NITRODUR) 0.1 mg/hr PT24, Place 1 patch onto the skin once daily., Disp: , Rfl: 11    rosuvastatin (CRESTOR) 40 MG Tab, Take 1 tablet (40 mg total) by mouth once daily., Disp: 90 tablet, Rfl: 3    UNABLE TO FIND, Take 1 tablet by mouth once daily. PREVAGEN, Disp: , Rfl:     " "vitamin E 400 UNIT capsule, Take 400 Units by mouth once daily., Disp: , Rfl:     Review of Systems   Constitutional: Negative for appetite change, chills, fatigue, fever and unexpected weight change.   HENT: Negative for congestion, ear pain and trouble swallowing.    Eyes: Negative for pain, discharge and visual disturbance.   Respiratory: Negative for apnea, cough, shortness of breath and wheezing.    Cardiovascular: Negative for chest pain and leg swelling.   Gastrointestinal: Positive for blood in stool (had  cautery of rectal radiation colitis). Negative for abdominal pain, constipation, diarrhea, nausea and vomiting.   Endocrine: Negative for cold intolerance, heat intolerance and polydipsia.   Genitourinary: Negative for dysuria, hematuria, testicular pain and urgency.   Musculoskeletal: Negative for gait problem, joint swelling and myalgias.   Neurological: Negative for dizziness, seizures and numbness.   Psychiatric/Behavioral: Negative for agitation, behavioral problems and hallucinations. The patient is not nervous/anxious.           Objective:      Vitals:    02/15/22 0934   BP: 128/70   Pulse: 82   Weight: 71.7 kg (158 lb)   Height: 5' 8" (1.727 m)     Physical Exam  Vitals and nursing note reviewed.   Constitutional:       General: He is not in acute distress.     Appearance: Normal appearance. He is well-developed and well-nourished. He is not toxic-appearing.      Comments: Elderly male appears in good shape.   HENT:      Head: Normocephalic and atraumatic.      Right Ear: Tympanic membrane and external ear normal.      Left Ear: Tympanic membrane and external ear normal.      Nose: Nose normal.      Mouth/Throat:      Mouth: Oropharynx is clear and moist.      Pharynx: Oropharynx is clear.   Eyes:      Extraocular Movements: EOM normal.      Pupils: Pupils are equal, round, and reactive to light.   Neck:      Thyroid: No thyromegaly.      Vascular: No carotid bruit.   Cardiovascular:      Rate and " Rhythm: Normal rate and regular rhythm.      Pulses: Intact distal pulses.      Heart sounds: Normal heart sounds. No murmur heard.      Pulmonary:      Effort: Pulmonary effort is normal.      Breath sounds: Normal breath sounds. No wheezing or rales.   Abdominal:      General: Bowel sounds are normal. There is no distension.      Palpations: Abdomen is soft. There is no hepatosplenomegaly.      Tenderness: There is no abdominal tenderness.   Musculoskeletal:         General: No tenderness or deformity. Normal range of motion.      Cervical back: Normal range of motion and neck supple.      Lumbar back: Normal. No pain or spasms.      Comments: Bends 90 degrees at  waist, shoulders and knees have good range of motion some stiffness and crepitance to the knees.  No pitting edema to lower extremities.   Lymphadenopathy:      Cervical: No cervical adenopathy.   Skin:     General: Skin is warm and dry.      Findings: No rash.   Neurological:      Mental Status: He is alert and oriented to person, place, and time. Mental status is at baseline.      Cranial Nerves: No cranial nerve deficit.      Coordination: Coordination normal.   Psychiatric:         Mood and Affect: Mood and affect normal.         Behavior: Behavior normal.         Thought Content: Thought content normal.         Judgment: Judgment normal.           Assessment:       1. Essential hypertension    2. DDD (degenerative disc disease), lumbar    3. Coronary artery disease    4. Hypercholesteremia    5. Stage 3 chronic kidney disease, unspecified whether stage 3a or 3b CKD    6. History of prostate cancer    7. Acquired hypothyroidism    8. Gastroesophageal reflux disease without esophagitis    9. 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 Imbalance         Plan:       Essential hypertension  -     CBC Auto Differential; Future; Expected date: 02/15/2022  -     Comprehensive Metabolic Panel; Future; Expected date:  02/15/2022  -     Lipid Panel; Future; Expected date: 02/15/2022  -     TSH w/reflex to FT4; Future; Expected date: 02/15/2022  Blood pressure well controlled today.  Will need routine lab work in 2 weeks.  DDD (degenerative disc disease), lumbar  Managing well conservatively  Coronary artery disease  Follows up with cardiology ,.  Hypercholesteremia  Lipid panel ordered  Stage 3 chronic kidney disease, unspecified whether stage 3a or 3b CKD  Will check kidney function in 2 weeks  History of prostate cancer  In remission  Acquired hypothyroidism    Gastroesophageal reflux disease without esophagitis    Irritable bowel syndrome with constipation    Radiation colitis    Imbalance  No recent falls    No follow-ups on file.        2/15/2022 Lauri Santos

## 2022-03-08 LAB
ALBUMIN SERPL-MCNC: 4.2 G/DL (ref 3.6–5.1)
ALBUMIN/GLOB SERPL: 1.6 (CALC) (ref 1–2.5)
ALP SERPL-CCNC: 60 U/L (ref 35–144)
ALT SERPL-CCNC: 21 U/L (ref 9–46)
AST SERPL-CCNC: 24 U/L (ref 10–35)
BASOPHILS # BLD AUTO: 19 CELLS/UL (ref 0–200)
BASOPHILS NFR BLD AUTO: 0.4 %
BILIRUB SERPL-MCNC: 0.8 MG/DL (ref 0.2–1.2)
BUN SERPL-MCNC: 27 MG/DL (ref 7–25)
BUN/CREAT SERPL: 23 (CALC) (ref 6–22)
CALCIUM SERPL-MCNC: 10 MG/DL (ref 8.6–10.3)
CHLORIDE SERPL-SCNC: 102 MMOL/L (ref 98–110)
CHOLEST SERPL-MCNC: 161 MG/DL
CHOLEST/HDLC SERPL: 3.4 (CALC)
CO2 SERPL-SCNC: 30 MMOL/L (ref 20–32)
CREAT SERPL-MCNC: 1.16 MG/DL (ref 0.7–1.11)
EOSINOPHIL # BLD AUTO: 278 CELLS/UL (ref 15–500)
EOSINOPHIL NFR BLD AUTO: 5.8 %
ERYTHROCYTE [DISTWIDTH] IN BLOOD BY AUTOMATED COUNT: 13.3 % (ref 11–15)
GLOBULIN SER CALC-MCNC: 2.7 G/DL (CALC) (ref 1.9–3.7)
GLUCOSE SERPL-MCNC: 98 MG/DL (ref 65–99)
HCT VFR BLD AUTO: 42.1 % (ref 38.5–50)
HDLC SERPL-MCNC: 48 MG/DL
HGB BLD-MCNC: 14.2 G/DL (ref 13.2–17.1)
LDLC SERPL CALC-MCNC: 91 MG/DL (CALC)
LYMPHOCYTES # BLD AUTO: 1603 CELLS/UL (ref 850–3900)
LYMPHOCYTES NFR BLD AUTO: 33.4 %
MCH RBC QN AUTO: 31.2 PG (ref 27–33)
MCHC RBC AUTO-ENTMCNC: 33.7 G/DL (ref 32–36)
MCV RBC AUTO: 92.5 FL (ref 80–100)
MONOCYTES # BLD AUTO: 566 CELLS/UL (ref 200–950)
MONOCYTES NFR BLD AUTO: 11.8 %
NEUTROPHILS # BLD AUTO: 2333 CELLS/UL (ref 1500–7800)
NEUTROPHILS NFR BLD AUTO: 48.6 %
NONHDLC SERPL-MCNC: 113 MG/DL (CALC)
PLATELET # BLD AUTO: 190 THOUSAND/UL (ref 140–400)
PMV BLD REES-ECKER: 9 FL (ref 7.5–12.5)
POTASSIUM SERPL-SCNC: 4.5 MMOL/L (ref 3.5–5.3)
PROT SERPL-MCNC: 6.9 G/DL (ref 6.1–8.1)
RBC # BLD AUTO: 4.55 MILLION/UL (ref 4.2–5.8)
SODIUM SERPL-SCNC: 139 MMOL/L (ref 135–146)
T4 FREE SERPL-MCNC: 1.3 NG/DL (ref 0.8–1.8)
TRIGL SERPL-MCNC: 119 MG/DL
TSH SERPL-ACNC: 4.79 MIU/L (ref 0.4–4.5)
WBC # BLD AUTO: 4.8 THOUSAND/UL (ref 3.8–10.8)

## 2022-03-13 NOTE — PROGRESS NOTES
Call patient.  CBC shows no anemia.  Kidneys are mildly dehydrated.  Sugar liver and cholesterol are all normal.  Thyroid is borderline underactive.  He needs to drink more water daily .we will recheck a BMP and a TSH in 6 months

## 2022-03-14 ENCOUNTER — TELEPHONE (OUTPATIENT)
Dept: FAMILY MEDICINE | Facility: CLINIC | Age: 87
End: 2022-03-14
Payer: MEDICARE

## 2022-03-14 NOTE — TELEPHONE ENCOUNTER
----- Message from Lauri Santos MD sent at 3/12/2022  7:41 PM CST -----  Call patient.  CBC shows no anemia.  Kidneys are mildly dehydrated.  Sugar liver and cholesterol are all normal.  Thyroid is borderline underactive.  He needs to drink more water daily .we will recheck a BMP and a TSH in 6 months

## 2022-03-14 NOTE — LETTER
[address] [facility] [address]  [place] [address]  Phone: [phone]   [phone]                        MD Chano Elias, MD Lauren Yuan, MD Nitin Argueta, KESHAWN Bradley, MARKEL Garcia, MARKEL Lees, MARKEL Hoang PA-C      Date: 03/16/2022        Patient: Melvin Powers  YOB: 1930      Dear Mr Charles,    We have tried to reach you by telephone several times with no success. Below are your lab results from Dr Santos. Please let us know if you have any questions. I will call you in 6 months when repeat lab is due.      ----- Message from Lauri Santos MD sent at 3/12/2022  7:41 PM CST -----  Call patient.  CBC shows no anemia.  Kidneys are mildly dehydrated.  Sugar liver and cholesterol are all normal.  Thyroid is borderline underactive.  He needs to drink more water daily .we will recheck a BMP and a TSH in 6 months         Sincerely,     Dr Santos/sherin

## 2022-03-15 ENCOUNTER — TELEPHONE (OUTPATIENT)
Dept: FAMILY MEDICINE | Facility: CLINIC | Age: 87
End: 2022-03-15
Payer: MEDICARE

## 2022-03-15 NOTE — TELEPHONE ENCOUNTER
----- Message from Orquidea Chandler sent at 3/15/2022  2:09 PM CDT -----  Pt calling requesting for a hard copy of his most recent labs be mailed to him @ [address] [place] [address] [place] [address].    [phone]

## 2022-06-27 ENCOUNTER — TELEPHONE (OUTPATIENT)
Dept: FAMILY MEDICINE | Facility: CLINIC | Age: 87
End: 2022-06-27

## 2022-06-27 DIAGNOSIS — I25.84 CORONARY ARTERY DISEASE DUE TO CALCIFIED CORONARY LESION: ICD-10-CM

## 2022-06-27 DIAGNOSIS — I10 ESSENTIAL HYPERTENSION: Primary | ICD-10-CM

## 2022-06-27 DIAGNOSIS — E03.9 ACQUIRED HYPOTHYROIDISM: ICD-10-CM

## 2022-06-27 DIAGNOSIS — Z79.899 ENCOUNTER FOR LONG-TERM (CURRENT) USE OF OTHER MEDICATIONS: ICD-10-CM

## 2022-06-27 DIAGNOSIS — N18.30 STAGE 3 CHRONIC KIDNEY DISEASE, UNSPECIFIED WHETHER STAGE 3A OR 3B CKD: ICD-10-CM

## 2022-06-27 DIAGNOSIS — I25.10 CORONARY ARTERY DISEASE DUE TO CALCIFIED CORONARY LESION: ICD-10-CM

## 2022-06-27 NOTE — TELEPHONE ENCOUNTER
Per Dr Santos from 3/2022 labs, BMP and TSH due in August. Sent orders. Patient's wife has orders in system for lab as well. Will come a week prior to visit

## 2022-06-27 NOTE — TELEPHONE ENCOUNTER
----- Message from Al Salguero sent at 6/24/2022 12:05 PM CDT -----  Pt came in to find out when him/his wife appointment is and they both need labs put in  [phone]

## 2022-07-18 DIAGNOSIS — I25.84 CORONARY ARTERY DISEASE DUE TO CALCIFIED CORONARY LESION: ICD-10-CM

## 2022-07-18 DIAGNOSIS — E03.9 ACQUIRED HYPOTHYROIDISM: ICD-10-CM

## 2022-07-18 DIAGNOSIS — I25.10 CORONARY ARTERY DISEASE DUE TO CALCIFIED CORONARY LESION: ICD-10-CM

## 2022-07-18 DIAGNOSIS — I77.811 AORTIC ECTASIA, ABDOMINAL: ICD-10-CM

## 2022-07-18 DIAGNOSIS — I70.0 AORTIC ATHEROSCLEROSIS: ICD-10-CM

## 2022-07-18 RX ORDER — CLOPIDOGREL BISULFATE 75 MG/1
75 TABLET ORAL DAILY
Qty: 90 TABLET | Refills: 1 | Status: SHIPPED | OUTPATIENT
Start: 2022-07-18 | End: 2022-09-02 | Stop reason: SDUPTHER

## 2022-07-18 RX ORDER — ROSUVASTATIN CALCIUM 40 MG/1
40 TABLET, COATED ORAL DAILY
Qty: 90 TABLET | Refills: 1 | Status: SHIPPED | OUTPATIENT
Start: 2022-07-18 | End: 2022-09-02 | Stop reason: SDUPTHER

## 2022-07-18 RX ORDER — LEVOTHYROXINE SODIUM 75 UG/1
75 TABLET ORAL DAILY
Qty: 90 TABLET | Refills: 1 | Status: SHIPPED | OUTPATIENT
Start: 2022-07-18 | End: 2022-09-02 | Stop reason: SDUPTHER

## 2022-07-18 NOTE — TELEPHONE ENCOUNTER
----- Message from Irais Lomax sent at 7/18/2022  9:54 AM CDT -----  Patient called and stated that he need a refill of his rosuvastatin,levothyroxine, and his clopidogrel called into [facility]'s pharmacy if any questions please give him a call at [phone]

## 2022-08-04 LAB
BUN SERPL-MCNC: 22 MG/DL (ref 7–25)
BUN/CREAT SERPL: ABNORMAL (CALC) (ref 6–22)
CALCIUM SERPL-MCNC: 9.3 MG/DL (ref 8.6–10.3)
CHLORIDE SERPL-SCNC: 105 MMOL/L (ref 98–110)
CO2 SERPL-SCNC: 27 MMOL/L (ref 20–32)
CREAT SERPL-MCNC: 1.22 MG/DL (ref 0.7–1.22)
EGFR: 56 ML/MIN/1.73M2
GLUCOSE SERPL-MCNC: 108 MG/DL (ref 65–99)
POTASSIUM SERPL-SCNC: 4.7 MMOL/L (ref 3.5–5.3)
SODIUM SERPL-SCNC: 139 MMOL/L (ref 135–146)
TSH SERPL-ACNC: 3.56 MIU/L (ref 0.4–4.5)

## 2022-08-12 ENCOUNTER — TELEPHONE (OUTPATIENT)
Dept: FAMILY MEDICINE | Facility: CLINIC | Age: 87
End: 2022-08-12

## 2022-08-12 NOTE — TELEPHONE ENCOUNTER
----- Message from Janneth Elizondo sent at 8/12/2022 11:33 AM CDT -----  Patient would like a copy of his bloodwork results sent to his home address. Patient's callback number is [phone].

## 2022-09-02 ENCOUNTER — OFFICE VISIT (OUTPATIENT)
Dept: FAMILY MEDICINE | Facility: CLINIC | Age: 87
End: 2022-09-02
Payer: MEDICARE

## 2022-09-02 VITALS
DIASTOLIC BLOOD PRESSURE: 72 MMHG | BODY MASS INDEX: 23.95 KG/M2 | HEIGHT: 68 IN | WEIGHT: 158 LBS | HEART RATE: 77 BPM | SYSTOLIC BLOOD PRESSURE: 138 MMHG

## 2022-09-02 DIAGNOSIS — M54.16 LUMBAR RADICULOPATHY: ICD-10-CM

## 2022-09-02 DIAGNOSIS — E78.00 HYPERCHOLESTEREMIA: Chronic | ICD-10-CM

## 2022-09-02 DIAGNOSIS — I70.0 AORTIC ATHEROSCLEROSIS: ICD-10-CM

## 2022-09-02 DIAGNOSIS — I10 ESSENTIAL HYPERTENSION: ICD-10-CM

## 2022-09-02 DIAGNOSIS — E03.9 ACQUIRED HYPOTHYROIDISM: ICD-10-CM

## 2022-09-02 DIAGNOSIS — K58.1 IRRITABLE BOWEL SYNDROME WITH CONSTIPATION: ICD-10-CM

## 2022-09-02 DIAGNOSIS — M50.90 CERVICAL DISC DISEASE: Primary | ICD-10-CM

## 2022-09-02 DIAGNOSIS — I25.10 CORONARY ARTERY DISEASE DUE TO CALCIFIED CORONARY LESION: ICD-10-CM

## 2022-09-02 DIAGNOSIS — I77.811 AORTIC ECTASIA, ABDOMINAL: ICD-10-CM

## 2022-09-02 DIAGNOSIS — Z85.46 HISTORY OF PROSTATE CANCER: ICD-10-CM

## 2022-09-02 DIAGNOSIS — K21.9 GASTROESOPHAGEAL REFLUX DISEASE WITHOUT ESOPHAGITIS: ICD-10-CM

## 2022-09-02 DIAGNOSIS — M51.36 DDD (DEGENERATIVE DISC DISEASE), LUMBAR: ICD-10-CM

## 2022-09-02 DIAGNOSIS — I25.84 CORONARY ARTERY DISEASE DUE TO CALCIFIED CORONARY LESION: ICD-10-CM

## 2022-09-02 PROCEDURE — 1101F PT FALLS ASSESS-DOCD LE1/YR: CPT | Mod: CPTII,S$GLB,, | Performed by: FAMILY MEDICINE

## 2022-09-02 PROCEDURE — 99214 OFFICE O/P EST MOD 30 MIN: CPT | Mod: S$GLB,,, | Performed by: FAMILY MEDICINE

## 2022-09-02 PROCEDURE — 99214 PR OFFICE/OUTPT VISIT, EST, LEVL IV, 30-39 MIN: ICD-10-PCS | Mod: S$GLB,,, | Performed by: FAMILY MEDICINE

## 2022-09-02 PROCEDURE — 1101F PR PT FALLS ASSESS DOC 0-1 FALLS W/OUT INJ PAST YR: ICD-10-PCS | Mod: CPTII,S$GLB,, | Performed by: FAMILY MEDICINE

## 2022-09-02 PROCEDURE — 1159F PR MEDICATION LIST DOCUMENTED IN MEDICAL RECORD: ICD-10-PCS | Mod: CPTII,S$GLB,, | Performed by: FAMILY MEDICINE

## 2022-09-02 PROCEDURE — 1159F MED LIST DOCD IN RCRD: CPT | Mod: CPTII,S$GLB,, | Performed by: FAMILY MEDICINE

## 2022-09-02 PROCEDURE — 3288F PR FALLS RISK ASSESSMENT DOCUMENTED: ICD-10-PCS | Mod: CPTII,S$GLB,, | Performed by: FAMILY MEDICINE

## 2022-09-02 PROCEDURE — 3288F FALL RISK ASSESSMENT DOCD: CPT | Mod: CPTII,S$GLB,, | Performed by: FAMILY MEDICINE

## 2022-09-02 RX ORDER — ROSUVASTATIN CALCIUM 40 MG/1
40 TABLET, COATED ORAL DAILY
Qty: 90 TABLET | Refills: 1 | Status: SHIPPED | OUTPATIENT
Start: 2022-09-02 | End: 2023-04-12 | Stop reason: SDUPTHER

## 2022-09-02 RX ORDER — ESOMEPRAZOLE MAGNESIUM 40 MG/1
CAPSULE, DELAYED RELEASE ORAL
Qty: 90 CAPSULE | Refills: 1 | Status: SHIPPED | OUTPATIENT
Start: 2022-09-02 | End: 2023-04-12 | Stop reason: SDUPTHER

## 2022-09-02 RX ORDER — LEVOTHYROXINE SODIUM 75 UG/1
75 TABLET ORAL DAILY
Qty: 90 TABLET | Refills: 1 | Status: SHIPPED | OUTPATIENT
Start: 2022-09-02 | End: 2023-04-12 | Stop reason: SDUPTHER

## 2022-09-02 RX ORDER — CLOPIDOGREL BISULFATE 75 MG/1
75 TABLET ORAL DAILY
Qty: 90 TABLET | Refills: 1 | Status: SHIPPED | OUTPATIENT
Start: 2022-09-02 | End: 2023-04-12 | Stop reason: SDUPTHER

## 2022-09-04 NOTE — PROGRESS NOTES
SUBJECTIVE:    Patient ID: Melvin Powers is a 89 y.o. male.    Chief Complaint: Neck Pain (No bottles // need refills // abc)    89-year-old male he stays active gardening in his yd and harvesting figs and tomatoes.  He is caregiving for his wife who has dementia.  He complains of neck pains frequently.  His hands have arthritis as well and he has some Voltaren gel.    He cooks 2 meals a day for his family and drinks Ensure also.    CAD-sees an [facility] cardiologist .    He has cataracts on his left eye which allowed his vision some but not significantly.  He is not rushing and getting eye surgery any time soon.      Telephone on 06/27/2022   Component Date Value Ref Range Status    Glucose 08/03/2022 108 (H)  65 - 99 mg/dL Final    BUN 08/03/2022 22  7 - 25 mg/dL Final    Creatinine 08/03/2022 1.22  0.70 - 1.22 mg/dL Final    EGFR 08/03/2022 56 (L)  > OR = 60 mL/min/1.73m2 Final    BUN/Creatinine Ratio 08/03/2022 NOT APPLICABLE  6 - 22 (calc) Final    Sodium 08/03/2022 139  135 - 146 mmol/L Final    Potassium 08/03/2022 4.7  3.5 - 5.3 mmol/L Final    Chloride 08/03/2022 105  98 - 110 mmol/L Final    CO2 08/03/2022 27  20 - 32 mmol/L Final    Calcium 08/03/2022 9.3  8.6 - 10.3 mg/dL Final    TSH w/reflex to FT4 08/03/2022 3.56  0.40 - 4.50 mIU/L Final       Past Medical History:   Diagnosis Date    Cataract     CKD (chronic kidney disease) stage 3, GFR 30-59 ml/min 3/16/2014    Coronary artery disease     GERD (gastroesophageal reflux disease)     Irritable bowel syndrome with constipation 11/30/2016     Social History     Socioeconomic History    Marital status:    Tobacco Use    Smoking status: Never    Smokeless tobacco: Never   Substance and Sexual Activity    Alcohol use: Yes     Alcohol/week: 2.0 standard drinks     Types: 1 Glasses of wine, 1 Cans of beer per week    Drug use: No     Past Surgical History:   Procedure Laterality Date    APPENDECTOMY      COLONOSCOPY      CYSTOSCOPY       "pterygium removal Left     UPPER GASTROINTESTINAL ENDOSCOPY       Family History   Problem Relation Age of Onset    Rheum arthritis Mother     Cancer Father     Rheum arthritis Father     Cancer Sister         bone    Cancer Brother        Review of patient's allergies indicates:   Allergen Reactions    Atorvastatin Other (See Comments)     Other reaction(s): Muscle pain    Codeine      Other reaction(s): makes him"goofey"    Contrast media     Iodinated contrast media      Other reaction(s): Rash       Current Outpatient Medications:     cholecalciferol, vitamin D3, (VITAMIN D3) 25 mcg (1,000 unit) capsule, Take 1,000 Units by mouth once daily., Disp: , Rfl:     coenzyme Q10 100 mg capsule, Take 200 mg by mouth once daily., Disp: , Rfl:     donepeziL (ARICEPT) 10 MG tablet, Take 1 tablet (10 mg total) by mouth every evening., Disp: 30 tablet, Rfl: 5    furosemide (LASIX) 20 MG tablet, Take 20 mg by mouth daily as needed., Disp: , Rfl:     magnesium gluconate 27.5 mg (500 mg) Tab, Take 500 mg by mouth once daily., Disp: , Rfl:     meclizine (ANTIVERT) 25 mg tablet, Take 1 tablet (25 mg total) by mouth 3 (three) times daily as needed., Disp: 60 tablet, Rfl: 0    multivitamin (THERAGRAN) per tablet, Take 1 tablet by mouth once daily., Disp: 100 tablet, Rfl: 3    nitroGLYCERIN 0.1 mg/hr TD PT24 (NITRODUR) 0.1 mg/hr PT24, Place 1 patch onto the skin once daily., Disp: , Rfl: 11    UNABLE TO FIND, Take 1 tablet by mouth once daily. PREVAGEN, Disp: , Rfl:     vitamin E 400 UNIT capsule, Take 400 Units by mouth once daily., Disp: , Rfl:     clopidogreL (PLAVIX) 75 mg tablet, Take 1 tablet (75 mg total) by mouth once daily., Disp: 90 tablet, Rfl: 1    esomeprazole (NEXIUM) 40 MG capsule, Nexium 40 mg capsule,delayed release  Take 1 capsule every day by oral route as needed., Disp: 90 capsule, Rfl: 1    levothyroxine (SYNTHROID) 75 MCG tablet, Take 1 tablet (75 mcg total) by mouth once daily., Disp: 90 tablet, Rfl: 1    " "linaCLOtide (LINZESS) 145 mcg Cap capsule, Take 1 capsule (145 mcg total) by mouth before breakfast., Disp: 90 capsule, Rfl: 1    lisinopriL 10 MG tablet, Take 0.5 tablets (5 mg total) by mouth once daily., Disp: 45 tablet, Rfl: 1    rosuvastatin (CRESTOR) 40 MG Tab, Take 1 tablet (40 mg total) by mouth once daily., Disp: 90 tablet, Rfl: 1    Review of Systems   Constitutional:  Negative for appetite change, chills, fatigue, fever and unexpected weight change.   HENT:  Negative for congestion, ear pain and trouble swallowing.    Eyes:  Negative for pain, discharge and visual disturbance.   Respiratory:  Negative for apnea, cough, shortness of breath and wheezing.    Cardiovascular:  Negative for chest pain and leg swelling.   Gastrointestinal:  Negative for abdominal pain, blood in stool, constipation, diarrhea, nausea and vomiting.   Endocrine: Negative for cold intolerance, heat intolerance and polydipsia.   Genitourinary:  Negative for dysuria, hematuria, testicular pain and urgency.   Musculoskeletal:  Positive for arthralgias (stiff joints in the fingers and hands.), neck pain and neck stiffness. Negative for gait problem, joint swelling and myalgias.   Neurological:  Negative for dizziness, seizures and numbness.   Psychiatric/Behavioral:  Negative for agitation, behavioral problems and hallucinations. The patient is not nervous/anxious.         Objective:      Vitals:    09/02/22 0949   BP: 138/72   Pulse: 77   Weight: 71.7 kg (158 lb)   Height: 5' 8" (1.727 m)     Physical Exam  Vitals and nursing note reviewed.   Constitutional:       General: He is not in acute distress.     Appearance: Normal appearance. He is well-developed and normal weight. He is not toxic-appearing.   HENT:      Head: Normocephalic and atraumatic.      Right Ear: Tympanic membrane and external ear normal.      Left Ear: Tympanic membrane and external ear normal.      Nose: Nose normal.      Mouth/Throat:      Pharynx: Oropharynx is " clear.   Eyes:      Pupils: Pupils are equal, round, and reactive to light.   Neck:      Thyroid: No thyromegaly.      Vascular: No carotid bruit.      Comments: Neck has decreased rotation 30° to the left and 45° to the right.  Decreased flexion extension secondary to pain.  Cardiovascular:      Rate and Rhythm: Normal rate and regular rhythm.      Heart sounds: Normal heart sounds. No murmur heard.  Pulmonary:      Effort: Pulmonary effort is normal.      Breath sounds: Normal breath sounds. No wheezing or rales.   Abdominal:      General: Bowel sounds are normal. There is no distension.      Palpations: Abdomen is soft.      Tenderness: There is no abdominal tenderness.   Musculoskeletal:         General: No deformity. Normal range of motion.      Cervical back: Normal range of motion. Tenderness present.      Lumbar back: Normal. No spasms.      Comments: Bends 90 degrees at  waist   Lymphadenopathy:      Cervical: No cervical adenopathy.   Skin:     General: Skin is warm and dry.      Findings: No rash.   Neurological:      Mental Status: He is alert and oriented to person, place, and time.      Cranial Nerves: No cranial nerve deficit.      Coordination: Coordination normal.      Gait: Gait normal.   Psychiatric:         Mood and Affect: Mood normal.         Behavior: Behavior normal.         Thought Content: Thought content normal.         Judgment: Judgment normal.         Assessment:       1. Cervical disc disease    2. Coronary artery disease    3. Gastroesophageal reflux disease without esophagitis    4. Acquired hypothyroidism    5. Irritable bowel syndrome with constipation    6. Aortic ectasia, abdominal    7. Aortic atherosclerosis    8. DDD (degenerative disc disease), lumbar    9. Lumbar radiculopathy    10. Hypercholesteremia    11. Essential hypertension    12. History of prostate cancer           Plan:       Cervical disc disease  Patient can use Tylenol for pain or may add Voltaren gel  topically.  Will not recommend NSAIDs at this time orally due to his age and kidney function  Coronary artery disease  -     clopidogreL (PLAVIX) 75 mg tablet; Take 1 tablet (75 mg total) by mouth once daily.  Dispense: 90 tablet; Refill: 1  -     rosuvastatin (CRESTOR) 40 MG Tab; Take 1 tablet (40 mg total) by mouth once daily.  Dispense: 90 tablet; Refill: 1  Continue cardiology follow-up  Gastroesophageal reflux disease without esophagitis  -     esomeprazole (NEXIUM) 40 MG capsule; Nexium 40 mg capsule,delayed release  Take 1 capsule every day by oral route as needed.  Dispense: 90 capsule; Refill: 1  Nexium working well  Acquired hypothyroidism  -     levothyroxine (SYNTHROID) 75 MCG tablet; Take 1 tablet (75 mcg total) by mouth once daily.  Dispense: 90 tablet; Refill: 1  Refill levothyroxine  Irritable bowel syndrome with constipation  -     linaCLOtide (LINZESS) 145 mcg Cap capsule; Take 1 capsule (145 mcg total) by mouth before breakfast.  Dispense: 90 capsule; Refill: 1    Aortic ectasia, abdominal  -     rosuvastatin (CRESTOR) 40 MG Tab; Take 1 tablet (40 mg total) by mouth once daily.  Dispense: 90 tablet; Refill: 1    Aortic atherosclerosis  -     rosuvastatin (CRESTOR) 40 MG Tab; Take 1 tablet (40 mg total) by mouth once daily.  Dispense: 90 tablet; Refill: 1    DDD (degenerative disc disease), lumbar    Lumbar radiculopathy  Stable at this time  Hypercholesteremia  Cardiology to order lab work  Essential hypertension    History of prostate cancer    Follow up in about 3 months (around 12/2/2022), or Cyndi- OA CAD.        9/4/2022 Lauri Santos

## 2022-12-06 NOTE — PROGRESS NOTES
SUBJECTIVE:    Patient ID: Melvin Powers is a 89 y.o. male.    Chief Complaint: Hypertension (Medication list brought//Pt is here for a check up and to discuss stomach issues//Pt would like flu vaccine today//DENISHA )    Pt here for 3 month f/u- HTN- pt new to me, normally sees Dr. Santos. Pt here with his daughter and wife    Pt c/oing of mild mid/upper abdominal pain off/on for the past week, also c/o's of excess flatus and bloating. Reports at times symptoms worse with food but at times pain is more constnat. Denies any nausea/vomitng, fver/chills. Not having any pain today. Pt reports saw Dr. Saldivar, GI years ago. He prescribed linzess daily but pt reports only takes occasionally d/t diarrhea after med. Reports hx of chronic constipation which was worsened after prostate radiation- had problems with radiation proctitis which have resolved. Reports was also previously prescribed nexium which helped with acid indigestion but hasn't been taking regularly because he feels it makes his constipation worse.    Hx of CAD- has 12 coronary stents- now follows with Dr. Whelan. Denies CP, SOB or palpitations. Only rarely takes lasix for mild ankle swelling    Pt still acitve gardening. Denies any falls. Pt is primary caregiver for his wife who has dementia      Telephone on 06/27/2022   Component Date Value Ref Range Status    Glucose 08/03/2022 108 (H)  65 - 99 mg/dL Final    BUN 08/03/2022 22  7 - 25 mg/dL Final    Creatinine 08/03/2022 1.22  0.70 - 1.22 mg/dL Final    eGFR 08/03/2022 56 (L)  > OR = 60 mL/min/1.73m2 Final    BUN/Creatinine Ratio 08/03/2022 NOT APPLICABLE  6 - 22 (calc) Final    Sodium 08/03/2022 139  135 - 146 mmol/L Final    Potassium 08/03/2022 4.7  3.5 - 5.3 mmol/L Final    Chloride 08/03/2022 105  98 - 110 mmol/L Final    CO2 08/03/2022 27  20 - 32 mmol/L Final    Calcium 08/03/2022 9.3  8.6 - 10.3 mg/dL Final    TSH w/reflex to FT4 08/03/2022 3.56  0.40 - 4.50 mIU/L Final       Past Medical History:    Diagnosis Date    Cataract     CKD (chronic kidney disease) stage 3, GFR 30-59 ml/min 3/16/2014    Coronary artery disease     GERD (gastroesophageal reflux disease)     Irritable bowel syndrome with constipation 11/30/2016     Past Surgical History:   Procedure Laterality Date    APPENDECTOMY      COLONOSCOPY      CYSTOSCOPY      pterygium removal Left     UPPER GASTROINTESTINAL ENDOSCOPY       Family History   Problem Relation Age of Onset    Rheum arthritis Mother     Cancer Father     Rheum arthritis Father     Cancer Sister         bone    Cancer Brother        The CVD Risk score (Michele, et al., 2008) failed to calculate for the following reasons:    The 2008 CVD risk score is only valid for ages 30 to 74    The patient has a prior MI, stroke, CHF, or peripheral vascular disease diagnosis     Marital Status:   Alcohol History:  reports current alcohol use of about 2.0 standard drinks per week.  Tobacco History:  reports that he has never smoked. He has never used smokeless tobacco.  Drug History:  reports no history of drug use.    Health Maintenance Topics with due status: Not Due       Topic Last Completion Date    TETANUS VACCINE 04/18/2013    Lipid Panel 03/07/2022    Aspirin/Antiplatelet Therapy 12/07/2022     Immunization History   Administered Date(s) Administered    COVID-19, MRNA, LN-S, PF (MODERNA FULL 0.5 ML DOSE) 01/21/2021, 02/18/2021    Influenza 10/10/2007, 10/18/2008, 10/14/2009    Influenza (FLUAD) - Quadrivalent - Adjuvanted - PF *Preferred* (65+) 12/14/2020    Influenza - High Dose - PF (65 years and older) 12/14/2010, 10/04/2011, 09/25/2012, 09/27/2013, 11/04/2014, 11/30/2015, 11/30/2016, 12/01/2017, 10/16/2018, 10/02/2019    Influenza - Quadrivalent - High Dose - PF (65 years and older) 12/07/2022    Influenza - Trivalent (ADULT) 10/10/2007, 10/18/2008, 10/14/2009    Pneumococcal Conjugate - 13 Valent 04/06/2016    Pneumococcal Polysaccharide - 23 Valent 03/15/2021    Tdap  "04/18/2013       Review of patient's allergies indicates:   Allergen Reactions    Atorvastatin Other (See Comments)     Other reaction(s): Muscle pain    Codeine      Other reaction(s): makes him"goofey"    Contrast media     Iodinated contrast media      Other reaction(s): Rash       Current Outpatient Medications:     cholecalciferol, vitamin D3, (VITAMIN D3) 25 mcg (1,000 unit) capsule, Take 1,000 Units by mouth once daily., Disp: , Rfl:     clopidogreL (PLAVIX) 75 mg tablet, Take 1 tablet (75 mg total) by mouth once daily., Disp: 90 tablet, Rfl: 1    coenzyme Q10 100 mg capsule, Take 200 mg by mouth once daily., Disp: , Rfl:     donepeziL (ARICEPT) 10 MG tablet, Take 1 tablet (10 mg total) by mouth every evening., Disp: 30 tablet, Rfl: 5    esomeprazole (NEXIUM) 40 MG capsule, Nexium 40 mg capsule,delayed release  Take 1 capsule every day by oral route as needed., Disp: 90 capsule, Rfl: 1    furosemide (LASIX) 20 MG tablet, Take 20 mg by mouth daily as needed., Disp: , Rfl:     levothyroxine (SYNTHROID) 75 MCG tablet, Take 1 tablet (75 mcg total) by mouth once daily., Disp: 90 tablet, Rfl: 1    lisinopriL 10 MG tablet, Take 0.5 tablets (5 mg total) by mouth once daily., Disp: 45 tablet, Rfl: 1    magnesium gluconate 27.5 mg (500 mg) Tab, Take 500 mg by mouth once daily., Disp: , Rfl:     meclizine (ANTIVERT) 25 mg tablet, Take 1 tablet (25 mg total) by mouth 3 (three) times daily as needed., Disp: 60 tablet, Rfl: 0    multivitamin (THERAGRAN) per tablet, Take 1 tablet by mouth once daily., Disp: 100 tablet, Rfl: 3    nitroGLYCERIN 0.1 mg/hr TD PT24 (NITRODUR) 0.1 mg/hr PT24, Place 1 patch onto the skin once daily., Disp: , Rfl: 11    rosuvastatin (CRESTOR) 40 MG Tab, Take 1 tablet (40 mg total) by mouth once daily., Disp: 90 tablet, Rfl: 1    vitamin E 400 UNIT capsule, Take 400 Units by mouth once daily., Disp: , Rfl:     linaCLOtide (LINZESS) 72 mcg Cap capsule, Take 1 capsule (72 mcg total) by mouth before " "breakfast., Disp: 30 capsule, Rfl: 5    UNABLE TO FIND, Take 1 tablet by mouth once daily. PREVAGEN, Disp: , Rfl:     Review of Systems   Constitutional:  Negative for appetite change, chills, fatigue, fever and unexpected weight change (wt down 7lbs over past year).   HENT:  Negative for congestion, ear pain, sore throat and trouble swallowing.    Eyes:  Negative for visual disturbance.   Respiratory:  Negative for apnea, cough, shortness of breath and wheezing.    Cardiovascular:  Positive for leg swelling (occas mild ankle swelling, takes lasix rarely). Negative for chest pain.   Gastrointestinal:  Positive for abdominal pain (see HPI) and constipation. Negative for blood in stool, diarrhea, nausea and vomiting.   Genitourinary:  Negative for dysuria, hematuria, testicular pain and urgency.   Musculoskeletal:  Positive for arthralgias (stiff joints in the fingers and hands.), neck pain and neck stiffness. Negative for gait problem, joint swelling and myalgias.   Skin:  Negative for rash.   Neurological:  Positive for dizziness (occasional vertigo). Negative for seizures, syncope and numbness.   Psychiatric/Behavioral:  Negative for dysphoric mood and sleep disturbance. The patient is not nervous/anxious.         Objective:      Vitals:    12/07/22 1106   BP: 130/62   Pulse: 70   SpO2: 98%   Weight: 70.1 kg (154 lb 9.6 oz)   Height: 5' 8" (1.727 m)     Physical Exam  Vitals reviewed.   Constitutional:       General: He is not in acute distress.     Appearance: Normal appearance. He is well-developed.      Comments: Pleasant elderly WM   HENT:      Head: Normocephalic and atraumatic.      Right Ear: Tympanic membrane and ear canal normal.      Left Ear: Tympanic membrane and ear canal normal.      Mouth/Throat:      Pharynx: No posterior oropharyngeal erythema.   Neck:      Vascular: No carotid bruit.   Cardiovascular:      Rate and Rhythm: Normal rate and regular rhythm.      Heart sounds: No murmur " heard.  Pulmonary:      Effort: Pulmonary effort is normal. No respiratory distress.      Breath sounds: Normal breath sounds. No wheezing or rales.   Abdominal:      General: There is no distension.      Palpations: Abdomen is soft.      Tenderness: There is no abdominal tenderness.   Musculoskeletal:      Cervical back: Neck supple. Muscular tenderness (mild paraspinous muscle tenderness) present.      Right lower leg: No edema.      Left lower leg: No edema.   Lymphadenopathy:      Cervical: No cervical adenopathy.   Skin:     General: Skin is warm and dry.      Findings: No rash.   Neurological:      General: No focal deficit present.      Mental Status: He is alert and oriented to person, place, and time.      Gait: Gait normal.   Psychiatric:         Mood and Affect: Mood normal.         Assessment:       1. Irritable bowel syndrome with constipation    2. Gastroesophageal reflux disease without esophagitis    3. Coronary artery disease    4. Dyslipidemia    5. History of prostate cancer    6. Flu vaccine need           Plan:       Irritable bowel syndrome with constipation  Comments:  recommend reducing linzess to 72mcg and take daily, resume nexium. cautioned to call office for severe pain, n/v or fever   Orders:  -     linaCLOtide (LINZESS) 72 mcg Cap capsule; Take 1 capsule (72 mcg total) by mouth before breakfast.  Dispense: 30 capsule; Refill: 5    Gastroesophageal reflux disease without esophagitis  Comments:  resume nexium daily    Coronary artery disease  Comments:  stable, denies angina- follows with Dr. Russell's regularly  Orders:  -     CBC Auto Differential; Future; Expected date: 12/07/2022  -     Comprehensive Metabolic Panel; Future; Expected date: 12/07/2022  -     Lipid Panel; Future; Expected date: 12/07/2022  -     Microalbumin/Creatinine Ratio, Urine; Future; Expected date: 12/07/2022  -     Urinalysis, Reflex to Urine Culture Urine, Clean Catch; Future; Expected date: 12/07/2022  -     TSH  w/reflex to FT4; Future; Expected date: 12/07/2022    Dyslipidemia  Comments:  well controlled on last labs    History of prostate cancer    Flu vaccine need  -     Influenza - Quadrivalent - High Dose (65+) (PF) (IM)    Follow up in about 4 months (around 4/7/2023) for Dr. Santos next visit, Labs before next visit.          Counseled on age and gender appropriate medical preventative services, including cancer screenings, immunizations, overall nutritional health, need for a consistent exercise regimen and an overall push towards maintaining a vigorous and active lifestyle.      12/7/2022 Cyndi Garcia NP

## 2022-12-07 ENCOUNTER — OFFICE VISIT (OUTPATIENT)
Dept: FAMILY MEDICINE | Facility: CLINIC | Age: 87
End: 2022-12-07
Payer: MEDICARE

## 2022-12-07 VITALS
OXYGEN SATURATION: 98 % | WEIGHT: 154.63 LBS | DIASTOLIC BLOOD PRESSURE: 62 MMHG | HEIGHT: 68 IN | BODY MASS INDEX: 23.44 KG/M2 | HEART RATE: 70 BPM | SYSTOLIC BLOOD PRESSURE: 130 MMHG

## 2022-12-07 DIAGNOSIS — Z85.46 HISTORY OF PROSTATE CANCER: ICD-10-CM

## 2022-12-07 DIAGNOSIS — K21.9 GASTROESOPHAGEAL REFLUX DISEASE WITHOUT ESOPHAGITIS: ICD-10-CM

## 2022-12-07 DIAGNOSIS — I25.10 CORONARY ARTERY DISEASE DUE TO CALCIFIED CORONARY LESION: ICD-10-CM

## 2022-12-07 DIAGNOSIS — Z23 FLU VACCINE NEED: ICD-10-CM

## 2022-12-07 DIAGNOSIS — K58.1 IRRITABLE BOWEL SYNDROME WITH CONSTIPATION: Primary | ICD-10-CM

## 2022-12-07 DIAGNOSIS — I25.84 CORONARY ARTERY DISEASE DUE TO CALCIFIED CORONARY LESION: ICD-10-CM

## 2022-12-07 DIAGNOSIS — E78.5 DYSLIPIDEMIA: ICD-10-CM

## 2022-12-07 PROCEDURE — G0008 FLU VACCINE - QUADRIVALENT - HIGH DOSE (65+) PRESERVATIVE FREE IM: ICD-10-PCS | Mod: S$GLB,,, | Performed by: NURSE PRACTITIONER

## 2022-12-07 PROCEDURE — 1101F PT FALLS ASSESS-DOCD LE1/YR: CPT | Mod: CPTII,S$GLB,, | Performed by: NURSE PRACTITIONER

## 2022-12-07 PROCEDURE — 99214 OFFICE O/P EST MOD 30 MIN: CPT | Mod: 25,S$GLB,, | Performed by: NURSE PRACTITIONER

## 2022-12-07 PROCEDURE — 1159F PR MEDICATION LIST DOCUMENTED IN MEDICAL RECORD: ICD-10-PCS | Mod: CPTII,S$GLB,, | Performed by: NURSE PRACTITIONER

## 2022-12-07 PROCEDURE — G0008 ADMIN INFLUENZA VIRUS VAC: HCPCS | Mod: S$GLB,,, | Performed by: NURSE PRACTITIONER

## 2022-12-07 PROCEDURE — 3288F FALL RISK ASSESSMENT DOCD: CPT | Mod: CPTII,S$GLB,, | Performed by: NURSE PRACTITIONER

## 2022-12-07 PROCEDURE — 3288F PR FALLS RISK ASSESSMENT DOCUMENTED: ICD-10-PCS | Mod: CPTII,S$GLB,, | Performed by: NURSE PRACTITIONER

## 2022-12-07 PROCEDURE — 1101F PR PT FALLS ASSESS DOC 0-1 FALLS W/OUT INJ PAST YR: ICD-10-PCS | Mod: CPTII,S$GLB,, | Performed by: NURSE PRACTITIONER

## 2022-12-07 PROCEDURE — 90662 IIV NO PRSV INCREASED AG IM: CPT | Mod: S$GLB,,, | Performed by: NURSE PRACTITIONER

## 2022-12-07 PROCEDURE — 1160F RVW MEDS BY RX/DR IN RCRD: CPT | Mod: CPTII,S$GLB,, | Performed by: NURSE PRACTITIONER

## 2022-12-07 PROCEDURE — 90662 FLU VACCINE - QUADRIVALENT - HIGH DOSE (65+) PRESERVATIVE FREE IM: ICD-10-PCS | Mod: S$GLB,,, | Performed by: NURSE PRACTITIONER

## 2022-12-07 PROCEDURE — 1159F MED LIST DOCD IN RCRD: CPT | Mod: CPTII,S$GLB,, | Performed by: NURSE PRACTITIONER

## 2022-12-07 PROCEDURE — 99214 PR OFFICE/OUTPT VISIT, EST, LEVL IV, 30-39 MIN: ICD-10-PCS | Mod: 25,S$GLB,, | Performed by: NURSE PRACTITIONER

## 2022-12-07 PROCEDURE — 1160F PR REVIEW ALL MEDS BY PRESCRIBER/CLIN PHARMACIST DOCUMENTED: ICD-10-PCS | Mod: CPTII,S$GLB,, | Performed by: NURSE PRACTITIONER

## 2022-12-07 NOTE — PATIENT INSTRUCTIONS
Start linzess 72mcg one capsule before breakfast to help with constipation and abdominal pain    Restart taking nexium daily    Have fasting bloodwork drawn 1 week before next visit

## 2022-12-26 ENCOUNTER — HOSPITAL ENCOUNTER (EMERGENCY)
Facility: HOSPITAL | Age: 87
Discharge: HOME | End: 2022-12-26
Attending: EMERGENCY MEDICINE
Payer: MEDICARE

## 2022-12-26 VITALS
HEART RATE: 60 BPM | SYSTOLIC BLOOD PRESSURE: 149 MMHG | TEMPERATURE: 98 F | DIASTOLIC BLOOD PRESSURE: 70 MMHG | OXYGEN SATURATION: 100 % | RESPIRATION RATE: 17 BRPM
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
## 2022-12-26 DIAGNOSIS — S20.211A CONTUSION OF RIB ON RIGHT SIDE, INITIAL ENCOUNTER: Primary | ICD-10-CM

## 2022-12-26 PROCEDURE — 99285 EMERGENCY DEPT VISIT HI MDM: CPT | Mod: 25

## 2022-12-26 NOTE — ED PROVIDER NOTES
"Encounter Date: 12/26/2022       History     Chief Complaint   Patient presents with    Back Pain     RT SIDED, FROM FALL 1 WEEK     89-year-old male with a past medical history coronary artery disease, reflux, chronic kidney disease presents emergency department with a fall and back pain.  Patient had a fall 1 week ago.  Put some sliders on the bottom of 1 of his chairs in his kitchen and the chair did not slide when he thought it would and he tripped over the leg of the chair and fell and hit his head on the wall and landed on a can soda with his back.  Has been having posterior right back pain ever since.  Has not really improved much so he decided to come and get it checked out today, 1 week later.  Denies any headache.  Occasional left-sided lateral neck pain.  Nonradiating.  Worse with turning to the left.  No bowel or bladder incontinence.  No chest pain or any shortness of breath reported.  Pain is located mostly to the posterior scapular region on the right side worse with movement, better with having pressure put on it he says.  No cough, no fever chills.    Review of patient's allergies indicates:   Allergen Reactions    Atorvastatin Other (See Comments)     Other reaction(s): Muscle pain    Codeine      Other reaction(s): makes him"goofey"    Contrast media     Iodinated contrast media      Other reaction(s): Rash     Past Medical History:   Diagnosis Date    Cataract     CKD (chronic kidney disease) stage 3, GFR 30-59 ml/min 3/16/2014    Coronary artery disease     GERD (gastroesophageal reflux disease)     Irritable bowel syndrome with constipation 11/30/2016     Past Surgical History:   Procedure Laterality Date    APPENDECTOMY      COLONOSCOPY      CYSTOSCOPY      pterygium removal Left     UPPER GASTROINTESTINAL ENDOSCOPY       Family History   Problem Relation Age of Onset    Rheum arthritis Mother     Cancer Father     Rheum arthritis Father     Cancer Sister         bone    Cancer Brother  "     Social History     Tobacco Use    Smoking status: Never    Smokeless tobacco: Never   Substance Use Topics    Alcohol use: Yes     Alcohol/week: 2.0 standard drinks     Types: 1 Glasses of wine, 1 Cans of beer per week    Drug use: No     Review of Systems   Constitutional:  Negative for chills and fever.   HENT:  Negative for sore throat.    Respiratory:  Negative for shortness of breath.    Cardiovascular:  Negative for chest pain and palpitations.   Gastrointestinal:  Negative for abdominal pain, nausea and vomiting.   Genitourinary:  Negative for dysuria.   Musculoskeletal:  Positive for back pain and neck pain.   Skin:  Negative for rash.   Neurological:  Negative for weakness and headaches.   Hematological:  Does not bruise/bleed easily.   All other systems reviewed and are negative.    Physical Exam     Initial Vitals [12/26/22 0930]   BP Pulse Resp Temp SpO2   (!) 179/73 77 18 98 °F (36.7 °C) 97 %      MAP       --         Physical Exam    Nursing note and vitals reviewed.  Constitutional: He appears well-developed and well-nourished. He is not diaphoretic. No distress.   HENT:   Head: Normocephalic and atraumatic.   Mouth/Throat: Oropharynx is clear and moist. No oropharyngeal exudate.   Eyes: Conjunctivae and EOM are normal. Pupils are equal, round, and reactive to light.   Neck: Neck supple. No tracheal deviation present.   No midline cervical spine tenderness to palpation.  Pain with range of motion to the left.  Mild tenderness left lateral aspect of the posterior neck.   Normal range of motion.  Cardiovascular:  Normal rate, regular rhythm, normal heart sounds and intact distal pulses.           No murmur heard.  Pulmonary/Chest: Breath sounds normal. No stridor. No respiratory distress. He has no wheezes. He has no rhonchi. He has no rales. He exhibits no tenderness.   Abdominal: Abdomen is soft. Bowel sounds are normal. He exhibits no distension. There is no abdominal tenderness. There is no  rebound and no guarding.   Musculoskeletal:         General: No tenderness or edema. Normal range of motion.      Cervical back: Normal range of motion and neck supple.      Comments: Thoracolumbar spine no midline tenderness to palpation.  Spine scapular region there is some mild tenderness to palpation/posterior ribs as well mild tenderness to palpation no crepitance.  Clear and equal breath sounds bilaterally.     Lymphadenopathy:     He has no cervical adenopathy.   Neurological: He is alert and oriented to person, place, and time. He has normal strength and normal reflexes. No cranial nerve deficit or sensory deficit. GCS score is 15. GCS eye subscore is 4. GCS verbal subscore is 5. GCS motor subscore is 6.   Skin: Skin is warm and dry. Capillary refill takes less than 2 seconds. No rash noted. No erythema. No pallor.   Psychiatric: He has a normal mood and affect. His behavior is normal. Thought content normal.       ED Course   Procedures  Labs Reviewed - No data to display       Imaging Results              CT Cervical Spine Without Contrast (Final result)  Result time 12/26/22 10:18:28      Final result by Henri Farias MD (12/26/22 10:18:28)                   Narrative:      EXAM: CT CERVICAL SPINE WITHOUT CONTRAST    HISTORY: PAIN. Patient fell one week ago    COMPARISON: None    TECHNIQUE: Multiple axial 1 mm thick images were obtained through the cervical spine. Coronal and sagittal reconstructions were also produced.    This exam was performed according to our departmental dose-optimization program, which includes automated exposure control, adjustment of the mA and/or kV according to patient size and/or use of iterative reconstruction technique.    FINDINGS: There is normal alignment. All of the vertebral bodies appear within normal limits in height. The facet joints are intact. There is no evidence of recent or old fracture or subluxation.    At C2-C3 and C3-C4 there is no significant disc  bulging or spinal canal or foraminal encroachment.    At C4-C5 there is moderate broad-based posterior disc bulging that indents the dural sac and produces mild to moderate narrowing of the subarachnoid space.. Bulging disc material also produces moderate narrowing of the right neural foramen. There is mild narrowing of the left neural foramen.    At C5-C6 there is marked disc space narrowing with vacuum phenomenon in the disc. There is prominent endplate bony spurring that moderately narrows the spinal canal and also produces moderately severe foraminal narrowing bilaterally.    At C6-C7 there is marked disc space narrowing with vacuum phenomenon in the disc. However, there is only minimal osteophytic spurring that along with broad-based posterior disc bulging produces mild narrowing of the dural sac. There is mild-to-moderate bilateral foraminal narrowing.    At C7-T1 there is no significant disc bulging or spinal canal or foraminal encroachment.    IMPRESSION:   No evidence of acute cervical spine fracture. Degenerative disc changes with disc bulging and endplate bony spurring at multiple levels as described in more detail above. No severe central canal stenosis or obvious focal disc herniation is identified at any level.    Electronically signed by:  Timmy Farias MD  12/26/2022 10:18 AM CST Workstation: [id number]                                     CT Head Without Contrast (Final result)  Result time 12/26/22 10:13:08      Final result by Reggie Frances MD (12/26/22 10:13:08)                   Narrative:    CT HEAD WITHOUT CONTRAST    CMS MANDATED QUALITY DATA - CT RADIATION  436    All CT scans at this facility utilize dose modulation, iterative reconstruction, and/or weight based dosing when appropriate to reduce radiation dose to as low as reasonably achievable    Clinical data: Headache, previous trauma    FINDINGS: Noninfusion images were obtained from the skull base to the vertex. There is no  intracranial mass, hemorrhage, or midline shift. Ventricles and sulci are mildly prominent. There are no pathologic extra-axial fluid collections.    There is no evidence of cortical ischemic change. Changes of mild chronic microvascular white matter disease are noted. Cerebellum and brainstem are normal. The calvarium is intact. There is mild bilateral ethmoid opacification, with mucoperiosteal thickening at the right frontal ethmoidal recess, consistent with chronic sinusitis.    IMPRESSION:    1. No acute intracranial abnormalities. Chronic findings as discussed above.        Electronically signed by:  Reggie Frances MD  12/26/2022 10:13 AM CST Workstation: [id number]                                     XR Ribs Min 3 Views w/PA Chest Right (Final result)  Result time 12/26/22 10:23:29      Final result by Henri Farias MD (12/26/22 10:23:29)                   Narrative:      EXAM: XR RIBS MIN 3 VIEWS W/ PA CHEST RIGHT    HISTORY: PAIN    COMPARISON:None    FINDINGS: A PA chest and multiple oblique views of the ribs of the right hemithorax were obtained. No bony abnormalities are identified within the ribs. There is no evidence of recent or old fracture. No lytic or blastic lesions are evident. Mild degenerative disc changes are noted in the thoracic spine. The lungs are well-expanded and free of infiltrates. There is no pneumothorax.    IMPRESSION:   Negative right rib detail x-rays. No fractures are identified.    Electronically signed by:  Timmy Farias MD  12/26/2022 10:23 AM [facility] Workstation: [id number]                                     Medications - No data to display  Medical Decision Making:   Clinical Tests:   Radiological Study: Ordered and Reviewed  ED Management:  Well-appearing 89-year-old male presents emergency department after a mechanical fall that happened 1 week ago.  He has no evidence of trauma to his head or cervical spine.  CT scans of head and cervical spine negative.  Patient  had a chest x-ray and rib series present no evidence of any fracture demonstrated.  Patient has no evidence of any pneumothorax or pneumonia.  Patient very well-appearing nontoxic has good mobility.  He is doing well at home on Tylenol.  Will continue Tylenol will recommend adding incentive spirometry.  Will recommend follow-up with primary care provider.    I had a detailed discussion with the patient and/or guardian regarding: The historical points, exam findings, and diagnostic results supporting the discharge diagnosis, lab results, pertinent radiology results, and the need for outpatient follow-up, for definitive care with a family practitioner and to return to the emergency department if symptoms worsen or persist or if there are any questions or concerns that arise at home. All questions have been answered in detail. Strict return to Emergency Department precautions have been provided.    A dictation software program was used for this note.  Please expect some simple typographical  errors in this note.                         Clinical Impression:   Final diagnoses:  [S20.211A] Contusion of rib on right side, initial encounter (Primary)  [W19.XXXA] Fall, initial encounter        ED Disposition Condition    Discharge Stable          ED Prescriptions    None       Follow-up Information       Follow up With Specialties Details Why Contact Info Additional Information    Lauri Santos MD Family Medicine, Home Health Services, Hospice Services In 3 days  [address]  [address]  [place] [address]  [phone]       [facility] - Emergency Dept Emergency Medicine  If symptoms worsen [address] [address]  [phone] 1st floor             Fabian Hinojosa,   12/26/22 1033

## 2022-12-26 NOTE — DISCHARGE INSTRUCTIONS
RETURN TO EMERGENCY DEPARTMENT WITHOUT FAIL, IF YOUR SYMPTOMS WORSEN, IF YOU GET NEW OR DIFFERENT SYMPTOMS, IF YOU ARE UNABLE TO FOLLOW UP AS DIRECTED, OR IF YOU HAVE ANY CONCERNS OR WORRIES.    Use incentive spirometer as directed.  Continue take Tylenol.  Follow up with primary care provider.

## 2022-12-26 NOTE — FIRST PROVIDER EVALUATION
Medical screening examination initiated.  I have conducted a focused provider triage encounter, findings are as follows:    Brief history of present illness:  Presents with posterior upper rib pain head and neck pain.  Patient reports he slipped and fell about a week ago.  He is not been seen for his pain.    Vitals:    12/26/22 0930   BP: (!) 179/73   BP Location: Left arm   Patient Position: Sitting   Pulse: 77   Resp: 18   Temp: 98 °F (36.7 °C)   TempSrc: Oral   SpO2: 97%       Pertinent physical exam:  Patient is tender posterior right upper ribs.  There is no evidence of bruising or abrasion.  There is no bony tenderness to his neck.  He is awake alert and oriented    Brief workup plan:  X-ray of the right ribs and chest.  CT head and neck.    Preliminary workup initiated; this workup will be continued and followed by the physician or advanced practice provider that is assigned to the patient when roomed.

## 2023-03-30 ENCOUNTER — TELEPHONE (OUTPATIENT)
Dept: FAMILY MEDICINE | Facility: CLINIC | Age: 88
End: 2023-03-30

## 2023-03-30 NOTE — TELEPHONE ENCOUNTER
Spoke to patient that fasting lab and urine is due a week prior to visit, orders at [facility], encouraged water. Advised he can take morning meds that do not need to be taken with food.

## 2023-04-06 LAB
ALBUMIN SERPL-MCNC: 4 G/DL (ref 3.6–5.1)
ALBUMIN/CREAT UR: 7 MCG/MG CREAT
ALBUMIN/GLOB SERPL: 1.6 (CALC) (ref 1–2.5)
ALP SERPL-CCNC: 65 U/L (ref 35–144)
ALT SERPL-CCNC: 19 U/L (ref 9–46)
APPEARANCE UR: CLEAR
AST SERPL-CCNC: 22 U/L (ref 10–35)
BACTERIA #/AREA URNS HPF: ABNORMAL /HPF
BACTERIA UR CULT: ABNORMAL
BACTERIA UR CULT: ABNORMAL
BASOPHILS # BLD AUTO: 28 CELLS/UL (ref 0–200)
BASOPHILS NFR BLD AUTO: 0.6 %
BILIRUB SERPL-MCNC: 0.7 MG/DL (ref 0.2–1.2)
BILIRUB UR QL STRIP: NEGATIVE
BUN SERPL-MCNC: 23 MG/DL (ref 7–25)
BUN/CREAT SERPL: NORMAL (CALC) (ref 6–22)
CALCIUM SERPL-MCNC: 9.8 MG/DL (ref 8.6–10.3)
CHLORIDE SERPL-SCNC: 102 MMOL/L (ref 98–110)
CHOLEST SERPL-MCNC: 138 MG/DL
CHOLEST/HDLC SERPL: 3 (CALC)
CO2 SERPL-SCNC: 29 MMOL/L (ref 20–32)
COLOR UR: YELLOW
CREAT SERPL-MCNC: 1.12 MG/DL (ref 0.7–1.22)
CREAT UR-MCNC: 72 MG/DL (ref 20–320)
EGFR: 61 ML/MIN/1.73M2
EOSINOPHIL # BLD AUTO: 334 CELLS/UL (ref 15–500)
EOSINOPHIL NFR BLD AUTO: 7.1 %
ERYTHROCYTE [DISTWIDTH] IN BLOOD BY AUTOMATED COUNT: 13.2 % (ref 11–15)
GLOBULIN SER CALC-MCNC: 2.5 G/DL (CALC) (ref 1.9–3.7)
GLUCOSE SERPL-MCNC: 97 MG/DL (ref 65–99)
GLUCOSE UR QL STRIP: NEGATIVE
HCT VFR BLD AUTO: 39.3 % (ref 38.5–50)
HDLC SERPL-MCNC: 46 MG/DL
HGB BLD-MCNC: 13.6 G/DL (ref 13.2–17.1)
HGB UR QL STRIP: NEGATIVE
HYALINE CASTS #/AREA URNS LPF: ABNORMAL /LPF
KETONES UR QL STRIP: NEGATIVE
LDLC SERPL CALC-MCNC: 73 MG/DL (CALC)
LEUKOCYTE ESTERASE UR QL STRIP: ABNORMAL
LYMPHOCYTES # BLD AUTO: 1213 CELLS/UL (ref 850–3900)
LYMPHOCYTES NFR BLD AUTO: 25.8 %
MCH RBC QN AUTO: 32.4 PG (ref 27–33)
MCHC RBC AUTO-ENTMCNC: 34.6 G/DL (ref 32–36)
MCV RBC AUTO: 93.6 FL (ref 80–100)
MICROALBUMIN UR-MCNC: 0.5 MG/DL
MONOCYTES # BLD AUTO: 536 CELLS/UL (ref 200–950)
MONOCYTES NFR BLD AUTO: 11.4 %
NEUTROPHILS # BLD AUTO: 2590 CELLS/UL (ref 1500–7800)
NEUTROPHILS NFR BLD AUTO: 55.1 %
NITRITE UR QL STRIP: NEGATIVE
NONHDLC SERPL-MCNC: 92 MG/DL (CALC)
PH UR STRIP: 6.5 [PH] (ref 5–8)
PLATELET # BLD AUTO: 208 THOUSAND/UL (ref 140–400)
PMV BLD REES-ECKER: 9.2 FL (ref 7.5–12.5)
POTASSIUM SERPL-SCNC: 4.3 MMOL/L (ref 3.5–5.3)
PROT SERPL-MCNC: 6.5 G/DL (ref 6.1–8.1)
PROT UR QL STRIP: NEGATIVE
RBC # BLD AUTO: 4.2 MILLION/UL (ref 4.2–5.8)
RBC #/AREA URNS HPF: ABNORMAL /HPF
SERVICE CMNT-IMP: ABNORMAL
SODIUM SERPL-SCNC: 138 MMOL/L (ref 135–146)
SP GR UR STRIP: 1.01 (ref 1–1.03)
SQUAMOUS #/AREA URNS HPF: ABNORMAL /HPF
TRIGL SERPL-MCNC: 104 MG/DL
TSH SERPL-ACNC: 3.96 MIU/L (ref 0.4–4.5)
WBC # BLD AUTO: 4.7 THOUSAND/UL (ref 3.8–10.8)
WBC #/AREA URNS HPF: ABNORMAL /HPF

## 2023-04-12 ENCOUNTER — OFFICE VISIT (OUTPATIENT)
Dept: FAMILY MEDICINE | Facility: CLINIC | Age: 88
End: 2023-04-12
Payer: MEDICARE

## 2023-04-12 VITALS
DIASTOLIC BLOOD PRESSURE: 65 MMHG | BODY MASS INDEX: 25.66 KG/M2 | WEIGHT: 154 LBS | SYSTOLIC BLOOD PRESSURE: 138 MMHG | HEIGHT: 65 IN | HEART RATE: 67 BPM

## 2023-04-12 DIAGNOSIS — E03.9 ACQUIRED HYPOTHYROIDISM: ICD-10-CM

## 2023-04-12 DIAGNOSIS — E78.00 HYPERCHOLESTEREMIA: Chronic | ICD-10-CM

## 2023-04-12 DIAGNOSIS — R26.89 IMBALANCE: ICD-10-CM

## 2023-04-12 DIAGNOSIS — Z85.46 HISTORY OF PROSTATE CANCER: ICD-10-CM

## 2023-04-12 DIAGNOSIS — M51.36 DDD (DEGENERATIVE DISC DISEASE), LUMBAR: ICD-10-CM

## 2023-04-12 DIAGNOSIS — M54.16 LUMBAR RADICULOPATHY: ICD-10-CM

## 2023-04-12 DIAGNOSIS — K52.0 RADIATION COLITIS: Chronic | ICD-10-CM

## 2023-04-12 DIAGNOSIS — G47.9 SLEEP DISORDER: Chronic | ICD-10-CM

## 2023-04-12 DIAGNOSIS — I77.811 AORTIC ECTASIA, ABDOMINAL: ICD-10-CM

## 2023-04-12 DIAGNOSIS — N18.30 STAGE 3 CHRONIC KIDNEY DISEASE, UNSPECIFIED WHETHER STAGE 3A OR 3B CKD: Chronic | ICD-10-CM

## 2023-04-12 DIAGNOSIS — I10 ESSENTIAL HYPERTENSION: ICD-10-CM

## 2023-04-12 DIAGNOSIS — I25.10 CORONARY ARTERY DISEASE DUE TO CALCIFIED CORONARY LESION: Primary | ICD-10-CM

## 2023-04-12 DIAGNOSIS — K58.1 IRRITABLE BOWEL SYNDROME WITH CONSTIPATION: Chronic | ICD-10-CM

## 2023-04-12 DIAGNOSIS — I70.0 AORTIC ATHEROSCLEROSIS: ICD-10-CM

## 2023-04-12 DIAGNOSIS — I25.84 CORONARY ARTERY DISEASE DUE TO CALCIFIED CORONARY LESION: Primary | ICD-10-CM

## 2023-04-12 DIAGNOSIS — K21.9 GASTROESOPHAGEAL REFLUX DISEASE WITHOUT ESOPHAGITIS: ICD-10-CM

## 2023-04-12 PROCEDURE — 99214 PR OFFICE/OUTPT VISIT, EST, LEVL IV, 30-39 MIN: ICD-10-PCS | Mod: S$GLB,,, | Performed by: FAMILY MEDICINE

## 2023-04-12 PROCEDURE — 1100F PR PT FALLS ASSESS DOC 2+ FALLS/FALL W/INJURY/YR: ICD-10-PCS | Mod: CPTII,S$GLB,, | Performed by: FAMILY MEDICINE

## 2023-04-12 PROCEDURE — 1159F MED LIST DOCD IN RCRD: CPT | Mod: CPTII,S$GLB,, | Performed by: FAMILY MEDICINE

## 2023-04-12 PROCEDURE — 1100F PTFALLS ASSESS-DOCD GE2>/YR: CPT | Mod: CPTII,S$GLB,, | Performed by: FAMILY MEDICINE

## 2023-04-12 PROCEDURE — 3288F FALL RISK ASSESSMENT DOCD: CPT | Mod: CPTII,S$GLB,, | Performed by: FAMILY MEDICINE

## 2023-04-12 PROCEDURE — 99214 OFFICE O/P EST MOD 30 MIN: CPT | Mod: S$GLB,,, | Performed by: FAMILY MEDICINE

## 2023-04-12 PROCEDURE — 1159F PR MEDICATION LIST DOCUMENTED IN MEDICAL RECORD: ICD-10-PCS | Mod: CPTII,S$GLB,, | Performed by: FAMILY MEDICINE

## 2023-04-12 PROCEDURE — 3288F PR FALLS RISK ASSESSMENT DOCUMENTED: ICD-10-PCS | Mod: CPTII,S$GLB,, | Performed by: FAMILY MEDICINE

## 2023-04-12 RX ORDER — ROSUVASTATIN CALCIUM 40 MG/1
40 TABLET, COATED ORAL DAILY
Qty: 90 TABLET | Refills: 3 | Status: SHIPPED | OUTPATIENT
Start: 2023-04-12

## 2023-04-12 RX ORDER — ESOMEPRAZOLE MAGNESIUM 40 MG/1
CAPSULE, DELAYED RELEASE ORAL
Qty: 90 CAPSULE | Refills: 3 | Status: SHIPPED | OUTPATIENT
Start: 2023-04-12

## 2023-04-12 RX ORDER — CLOPIDOGREL BISULFATE 75 MG/1
75 TABLET ORAL DAILY
Qty: 90 TABLET | Refills: 3 | Status: SHIPPED | OUTPATIENT
Start: 2023-04-12

## 2023-04-12 RX ORDER — LEVOTHYROXINE SODIUM 75 UG/1
75 TABLET ORAL DAILY
Qty: 90 TABLET | Refills: 3 | Status: SHIPPED | OUTPATIENT
Start: 2023-04-12

## 2023-04-12 RX ORDER — CLOTRIMAZOLE AND BETAMETHASONE DIPROPIONATE 10; .5 MG/ML; MG/ML
LOTION TOPICAL 2 TIMES DAILY
Qty: 180 ML | Refills: 1 | Status: SHIPPED | OUTPATIENT
Start: 2023-04-12

## 2023-04-12 NOTE — PROGRESS NOTES
SUBJECTIVE:    Patient ID: Melvin Powers is a 89 y.o. male.    Chief Complaint: Follow-up (Brought medications list , need refills, review Lab-results, no complaints, abc )    89-year-old male states he fell on 12/26/2022 and hit a wall.  He had CT scan of the cervical spine that showed no fractures.    He sees Dr. Chau orthopedics, and cortisone injection is planned in his left ring trigger finger and right middle trigger finger.    Cardiology Dr. Russell-12 coronary stents are present.    Patient has balance is fair although he did have a fall.  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    Prostate cancer in remission-status post radiation treatments.      Office Visit on 12/07/2022   Component Date Value Ref Range Status    WBC 04/03/2023 4.7  3.8 - 10.8 Thousand/uL Final    RBC 04/03/2023 4.20  4.20 - 5.80 Million/uL Final    Hemoglobin 04/03/2023 13.6  13.2 - 17.1 g/dL Final    Hematocrit 04/03/2023 39.3  38.5 - 50.0 % Final    MCV 04/03/2023 93.6  80.0 - 100.0 fL Final    MCH 04/03/2023 32.4  27.0 - 33.0 pg Final    MCHC 04/03/2023 34.6  32.0 - 36.0 g/dL Final    RDW 04/03/2023 13.2  11.0 - 15.0 % Final    Platelets 04/03/2023 208  140 - 400 Thousand/uL Final    MPV 04/03/2023 9.2  7.5 - 12.5 fL Final    Neutrophils, Abs 04/03/2023 2,590  1,500 - 7,800 cells/uL Final    Lymph # 04/03/2023 1,213  850 - 3,900 cells/uL Final    Mono # 04/03/2023 536  200 - 950 cells/uL Final    Eos # 04/03/2023 334  15 - 500 cells/uL Final    Baso # 04/03/2023 28  0 - 200 cells/uL Final    Neutrophils Relative 04/03/2023 55.1  % Final    Lymph % 04/03/2023 25.8  % Final    Mono % 04/03/2023 11.4  % Final    Eosinophil % 04/03/2023 7.1  % Final    Basophil % 04/03/2023 0.6  % Final    Glucose 04/03/2023 97  65 - 99 mg/dL Final    BUN 04/03/2023 23  7 - 25 mg/dL Final    Creatinine 04/03/2023 1.12  0.70 - 1.22 mg/dL Final    eGFR 04/03/2023 61  > OR = 60 mL/min/1.73m2 Final    BUN/Creatinine Ratio 04/03/2023 NOT APPLICABLE  6 - 22  (calc) Final    Sodium 04/03/2023 138  135 - 146 mmol/L Final    Potassium 04/03/2023 4.3  3.5 - 5.3 mmol/L Final    Chloride 04/03/2023 102  98 - 110 mmol/L Final    CO2 04/03/2023 29  20 - 32 mmol/L Final    Calcium 04/03/2023 9.8  8.6 - 10.3 mg/dL Final    Total Protein 04/03/2023 6.5  6.1 - 8.1 g/dL Final    Albumin 04/03/2023 4.0  3.6 - 5.1 g/dL Final    Globulin, Total 04/03/2023 2.5  1.9 - 3.7 g/dL (calc) Final    Albumin/Globulin Ratio 04/03/2023 1.6  1.0 - 2.5 (calc) Final    Total Bilirubin 04/03/2023 0.7  0.2 - 1.2 mg/dL Final    Alkaline Phosphatase 04/03/2023 65  35 - 144 U/L Final    AST 04/03/2023 22  10 - 35 U/L Final    ALT 04/03/2023 19  9 - 46 U/L Final    Cholesterol 04/03/2023 138  <200 mg/dL Final    HDL 04/03/2023 46  > OR = 40 mg/dL Final    Triglycerides 04/03/2023 104  <150 mg/dL Final    LDL Cholesterol 04/03/2023 73  mg/dL (calc) Final    HDL/Cholesterol Ratio 04/03/2023 3.0  <5.0 (calc) Final    Non HDL Chol. (LDL+VLDL) 04/03/2023 92  <130 mg/dL (calc) Final    Creatinine, Urine 04/03/2023 72  20 - 320 mg/dL Final    Microalb, Ur 04/03/2023 0.5  See Note: mg/dL Final    Microalb/Creat Ratio 04/03/2023 7  <30 mcg/mg creat Final    Color, UA 04/03/2023 YELLOW  YELLOW Final    Appearance, UA 04/03/2023 CLEAR  CLEAR Final    Specific Gravity, UA 04/03/2023 1.012  1.001 - 1.035 Final    pH, UA 04/03/2023 6.5  5.0 - 8.0 Final    Glucose, UA 04/03/2023 NEGATIVE  NEGATIVE Final    Bilirubin, UA 04/03/2023 NEGATIVE  NEGATIVE Final    Ketones, UA 04/03/2023 NEGATIVE  NEGATIVE Final    Occult Blood UA 04/03/2023 NEGATIVE  NEGATIVE Final    Protein, UA 04/03/2023 NEGATIVE  NEGATIVE Final    Nitrite, UA 04/03/2023 NEGATIVE  NEGATIVE Final    Leukocytes, UA 04/03/2023 TRACE (A)  NEGATIVE Final    WBC Casts, UA 04/03/2023 0-5  < OR = 5 /HPF Final    RBC Casts, UA 04/03/2023 NONE SEEN  < OR = 2 /HPF Final    Squam Epithel, UA 04/03/2023 NONE SEEN  < OR = 5 /HPF Final    Bacteria, UA 04/03/2023 MANY (A)   "NONE SEEN /HPF Final    Hyaline Casts, UA 04/03/2023 NONE SEEN  NONE SEEN /LPF Final    Service Cmt: 04/03/2023    Final    Reflexive Urine Culture 04/03/2023    Final    Urine Culture, Routine 04/03/2023  (A)   Final    TSH w/reflex to FT4 04/03/2023 3.96  0.40 - 4.50 mIU/L Final       Past Medical History:   Diagnosis Date    Cataract     CKD (chronic kidney disease) stage 3, GFR 30-59 ml/min 3/16/2014    Coronary artery disease     GERD (gastroesophageal reflux disease)     Irritable bowel syndrome with constipation 11/30/2016     Social History     Socioeconomic History    Marital status:    Tobacco Use    Smoking status: Never    Smokeless tobacco: Never   Substance and Sexual Activity    Alcohol use: Yes     Alcohol/week: 2.0 standard drinks     Types: 1 Glasses of wine, 1 Cans of beer per week    Drug use: No     Past Surgical History:   Procedure Laterality Date    APPENDECTOMY      COLONOSCOPY      CYSTOSCOPY      pterygium removal Left     UPPER GASTROINTESTINAL ENDOSCOPY       Family History   Problem Relation Age of Onset    Rheum arthritis Mother     Cancer Father     Rheum arthritis Father     Cancer Sister         bone    Cancer Brother        Review of patient's allergies indicates:   Allergen Reactions    Atorvastatin Other (See Comments)     Other reaction(s): Muscle pain    Codeine      Other reaction(s): makes him"goofey"    Contrast media     Iodinated contrast media      Other reaction(s): Rash       Current Outpatient Medications:     cholecalciferol, vitamin D3, (VITAMIN D3) 25 mcg (1,000 unit) capsule, Take 1,000 Units by mouth once daily., Disp: , Rfl:     coenzyme Q10 100 mg capsule, Take 200 mg by mouth once daily., Disp: , Rfl:     furosemide (LASIX) 20 MG tablet, Take 20 mg by mouth daily as needed., Disp: , Rfl:     linaCLOtide (LINZESS) 72 mcg Cap capsule, Take 1 capsule (72 mcg total) by mouth before breakfast., Disp: 30 capsule, Rfl: 5    magnesium gluconate 27.5 mg (500 mg) " Tab, Take 500 mg by mouth once daily., Disp: , Rfl:     meclizine (ANTIVERT) 25 mg tablet, Take 1 tablet (25 mg total) by mouth 3 (three) times daily as needed., Disp: 60 tablet, Rfl: 0    multivitamin (THERAGRAN) per tablet, Take 1 tablet by mouth once daily., Disp: 100 tablet, Rfl: 3    nitroGLYCERIN 0.1 mg/hr TD PT24 (NITRODUR) 0.1 mg/hr PT24, Place 1 patch onto the skin once daily., Disp: , Rfl: 11    UNABLE TO FIND, Take 1 tablet by mouth once daily. PREVAGEN, Disp: , Rfl:     vitamin E 400 UNIT capsule, Take 400 Units by mouth once daily., Disp: , Rfl:     clopidogreL (PLAVIX) 75 mg tablet, Take 1 tablet (75 mg total) by mouth once daily., Disp: 90 tablet, Rfl: 3    clotrimazole-betamethasone (LOTRISONE) lotion, Apply topically 2 (two) times daily., Disp: 180 mL, Rfl: 1    donepeziL (ARICEPT) 10 MG tablet, Take 1 tablet (10 mg total) by mouth every evening., Disp: 30 tablet, Rfl: 5    esomeprazole (NEXIUM) 40 MG capsule, Nexium 40 mg capsule,delayed release  Take 1 capsule every day by oral route as needed., Disp: 90 capsule, Rfl: 3    levothyroxine (SYNTHROID) 75 MCG tablet, Take 1 tablet (75 mcg total) by mouth once daily., Disp: 90 tablet, Rfl: 3    lisinopriL 10 MG tablet, Take 0.5 tablets (5 mg total) by mouth once daily., Disp: 45 tablet, Rfl: 1    rosuvastatin (CRESTOR) 40 MG Tab, Take 1 tablet (40 mg total) by mouth once daily., Disp: 90 tablet, Rfl: 3    Review of Systems   Constitutional:  Negative for appetite change, chills, fatigue, fever and unexpected weight change.   HENT:  Negative for ear pain and trouble swallowing.    Eyes:  Negative for pain, discharge and visual disturbance.   Respiratory:  Negative for apnea, cough, shortness of breath and wheezing.    Cardiovascular:  Negative for chest pain and leg swelling.   Gastrointestinal:  Positive for constipation (Linzess is  effective). Negative for abdominal pain, blood in stool, diarrhea, nausea, vomiting and reflux.   Endocrine: Negative  "for cold intolerance, heat intolerance and polydipsia.   Genitourinary:  Negative for bladder incontinence, dysuria, erectile dysfunction, frequency, hematuria, testicular pain and urgency.        Nocturia 2-3 x  nite   Musculoskeletal:  Negative for gait problem, joint swelling and myalgias.   Neurological:  Negative for dizziness, seizures and numbness.   Psychiatric/Behavioral:  Positive for sleep disturbance. Negative for agitation, behavioral problems and hallucinations. The patient is not nervous/anxious.          Objective:      Vitals:    04/12/23 1151 04/12/23 1207 04/12/23 1253   BP: (!) 150/74 (!) 150/70 138/65   Pulse: 67     Weight: 69.9 kg (154 lb)     Height: 5' 5" (1.651 m)       Physical Exam  Vitals and nursing note reviewed.   Constitutional:       General: He is not in acute distress.     Appearance: Normal appearance. He is well-developed. He is not toxic-appearing.   HENT:      Head: Normocephalic and atraumatic.      Right Ear: Tympanic membrane and external ear normal.      Left Ear: Tympanic membrane and external ear normal.      Nose: Nose normal.      Mouth/Throat:      Pharynx: Oropharynx is clear.   Eyes:      Pupils: Pupils are equal, round, and reactive to light.   Neck:      Thyroid: No thyromegaly.      Vascular: No carotid bruit.   Cardiovascular:      Rate and Rhythm: Normal rate and regular rhythm.      Heart sounds: Normal heart sounds. No murmur heard.  Pulmonary:      Effort: Pulmonary effort is normal.      Breath sounds: Normal breath sounds. No wheezing or rales.   Abdominal:      General: Bowel sounds are normal. There is no distension.      Palpations: Abdomen is soft.      Tenderness: There is no abdominal tenderness.   Musculoskeletal:         General: No tenderness or deformity. Normal range of motion.      Cervical back: Normal range of motion and neck supple.      Lumbar back: Normal. No spasms.      Comments: Bends 90 degrees at  waist, shoulders good range of " motion, knees good range of motion with mild crepitance.  No pitting edema to lower extremities.  Gait is normal.   Lymphadenopathy:      Cervical: No cervical adenopathy.   Skin:     General: Skin is warm and dry.      Findings: No rash.   Neurological:      Mental Status: He is alert and oriented to person, place, and time.      Cranial Nerves: No cranial nerve deficit.      Coordination: Coordination normal.      Gait: Gait normal.   Psychiatric:         Behavior: Behavior normal.         Thought Content: Thought content normal.         Judgment: Judgment normal.         Assessment:       1. Coronary artery disease    2. Gastroesophageal reflux disease without esophagitis    3. Acquired hypothyroidism    4. Aortic ectasia, abdominal    5. Aortic atherosclerosis    6. DDD (degenerative disc disease), lumbar    7. Lumbar radiculopathy    8. Essential hypertension    9. Hypercholesteremia    10. Stage 3 chronic kidney disease, unspecified whether stage 3a or 3b CKD    11. History of prostate cancer    12. 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 Sleep disorder    15. Imbalance         Plan:       Coronary artery disease  -     clopidogreL (PLAVIX) 75 mg tablet; Take 1 tablet (75 mg total) by mouth once daily.  Dispense: 90 tablet; Refill: 3  -     rosuvastatin (CRESTOR) 40 MG Tab; Take 1 tablet (40 mg total) by mouth once daily.  Dispense: 90 tablet; Refill: 3  Coronary status is stable, 12 coronary stents are present.  Gastroesophageal reflux disease without esophagitis  -     esomeprazole (NEXIUM) 40 MG capsule; Nexium 40 mg capsule,delayed release  Take 1 capsule every day by oral route as needed.  Dispense: 90 capsule; Refill: 3  Continue Nexium 40 mg  Acquired hypothyroidism  -     levothyroxine (SYNTHROID) 75 MCG tablet; Take 1 tablet (75 mcg total) by mouth once daily.  Dispense: 90 tablet; Refill: 3    Aortic ectasia, abdominal  -     rosuvastatin (CRESTOR) 40 MG Tab; Take 1  tablet (40 mg total) by mouth once daily.  Dispense: 90 tablet; Refill: 3    Aortic atherosclerosis  -     rosuvastatin (CRESTOR) 40 MG Tab; Take 1 tablet (40 mg total) by mouth once daily.  Dispense: 90 tablet; Refill: 3    DDD (degenerative disc disease), lumbar    Lumbar radiculopathy    Essential hypertension  Blood pressure well controlled on current regimen  Hypercholesteremia  Cholesterol 138 HDL 46  LDL 73, excellent lipid panel continue current statin  Stage 3 chronic kidney disease, unspecified whether stage 3a or 3b CKD    History of prostate cancer  In remission  Irritable bowel syndrome with constipation    Radiation colitis    Sleep disorder    Imbalance    Other orders  -     clotrimazole-betamethasone (LOTRISONE) lotion; Apply topically 2 (two) times daily.  Dispense: 180 mL; Refill: 1      Follow up in about 6 months (around 10/12/2023), or htn.        4/13/2023 Lauri Santos

## 2023-05-15 ENCOUNTER — HOSPITAL ENCOUNTER (EMERGENCY)
Facility: HOSPITAL | Age: 88
Discharge: HOME | End: 2023-05-15
Attending: EMERGENCY MEDICINE
Payer: MEDICARE

## 2023-05-15 VITALS
HEART RATE: 79 BPM | RESPIRATION RATE: 20 BRPM | BODY MASS INDEX: 25.63 KG/M2 | DIASTOLIC BLOOD PRESSURE: 91 MMHG | WEIGHT: 154 LBS | TEMPERATURE: 98 F | OXYGEN SATURATION: 97 % | SYSTOLIC BLOOD PRESSURE: 168 MMHG

## 2023-05-15 DIAGNOSIS — R31.9 HEMATURIA, UNSPECIFIED TYPE: Primary | ICD-10-CM

## 2023-05-15 DIAGNOSIS — Z79.01 ANTICOAGULANT LONG-TERM USE: ICD-10-CM

## 2023-05-15 LAB
ALBUMIN SERPL BCP-MCNC: 4.4 G/DL (ref 3.5–5.2)
ALP SERPL-CCNC: 60 U/L (ref 55–135)
ALT SERPL W/O P-5'-P-CCNC: 28 U/L (ref 10–44)
ANION GAP SERPL CALC-SCNC: 13 MMOL/L (ref 8–16)
AST SERPL-CCNC: 32 U/L (ref 10–40)
BACTERIA #/AREA URNS HPF: NEGATIVE /HPF
BASOPHILS # BLD AUTO: 0.02 K/UL (ref 0–0.2)
BASOPHILS NFR BLD: 0.4 % (ref 0–1.9)
BILIRUB SERPL-MCNC: 1.3 MG/DL (ref 0.1–1)
BILIRUB UR QL STRIP: ABNORMAL
BUN SERPL-MCNC: 23 MG/DL (ref 10–30)
CALCIUM SERPL-MCNC: 9.9 MG/DL (ref 8.7–10.5)
CHLORIDE SERPL-SCNC: 101 MMOL/L (ref 95–110)
CLARITY UR: ABNORMAL
CO2 SERPL-SCNC: 24 MMOL/L (ref 23–29)
COLOR UR: ABNORMAL
CREAT SERPL-MCNC: 1.1 MG/DL (ref 0.5–1.4)
DIFFERENTIAL METHOD: ABNORMAL
EOSINOPHIL # BLD AUTO: 0.1 K/UL (ref 0–0.5)
EOSINOPHIL NFR BLD: 1.5 % (ref 0–8)
ERYTHROCYTE [DISTWIDTH] IN BLOOD BY AUTOMATED COUNT: 13.7 % (ref 11.5–14.5)
EST. GFR  (NO RACE VARIABLE): >60 ML/MIN/1.73 M^2
GLUCOSE SERPL-MCNC: 102 MG/DL (ref 70–110)
GLUCOSE UR QL STRIP: ABNORMAL
HCT VFR BLD AUTO: 40.7 % (ref 40–54)
HGB BLD-MCNC: 14 G/DL (ref 14–18)
HGB UR QL STRIP: ABNORMAL
HYALINE CASTS #/AREA URNS LPF: 3 /LPF
IMM GRANULOCYTES # BLD AUTO: 0.02 K/UL (ref 0–0.04)
IMM GRANULOCYTES NFR BLD AUTO: 0.4 % (ref 0–0.5)
INR PPP: 1.1 (ref 0.8–1.2)
KETONES UR QL STRIP: ABNORMAL
LEUKOCYTE ESTERASE UR QL STRIP: ABNORMAL
LYMPHOCYTES # BLD AUTO: 1 K/UL (ref 1–4.8)
LYMPHOCYTES NFR BLD: 17.9 % (ref 18–48)
MCH RBC QN AUTO: 31.5 PG (ref 27–31)
MCHC RBC AUTO-ENTMCNC: 34.4 G/DL (ref 32–36)
MCV RBC AUTO: 92 FL (ref 82–98)
MICROSCOPIC COMMENT: ABNORMAL
MONOCYTES # BLD AUTO: 0.5 K/UL (ref 0.3–1)
MONOCYTES NFR BLD: 9.7 % (ref 4–15)
NEUTROPHILS # BLD AUTO: 3.8 K/UL (ref 1.8–7.7)
NEUTROPHILS NFR BLD: 70.1 % (ref 38–73)
NITRITE UR QL STRIP: ABNORMAL
NRBC BLD-RTO: 0 /100 WBC
PH UR STRIP: 5 [PH] (ref 5–8)
PLATELET # BLD AUTO: 169 K/UL (ref 150–450)
PMV BLD AUTO: 9 FL (ref 9.2–12.9)
POTASSIUM SERPL-SCNC: 4.1 MMOL/L (ref 3.5–5.1)
PROT SERPL-MCNC: 7.4 G/DL (ref 6–8.4)
PROT UR QL STRIP: ABNORMAL
PROTHROMBIN TIME: 11.2 SEC (ref 9–12.5)
RBC # BLD AUTO: 4.44 M/UL (ref 4.6–6.2)
RBC #/AREA URNS HPF: >100 /HPF (ref 0–4)
SODIUM SERPL-SCNC: 138 MMOL/L (ref 136–145)
SP GR UR STRIP: 1.01 (ref 1–1.03)
SQUAMOUS #/AREA URNS HPF: 1 /HPF
URN SPEC COLLECT METH UR: ABNORMAL
UROBILINOGEN UR STRIP-ACNC: ABNORMAL EU/DL
WBC # BLD AUTO: 5.35 K/UL (ref 3.9–12.7)
WBC #/AREA URNS HPF: 4 /HPF (ref 0–5)

## 2023-05-15 PROCEDURE — 80053 COMPREHEN METABOLIC PANEL: CPT | Performed by: NURSE PRACTITIONER

## 2023-05-15 PROCEDURE — 85610 PROTHROMBIN TIME: CPT | Performed by: NURSE PRACTITIONER

## 2023-05-15 PROCEDURE — 85025 COMPLETE CBC W/AUTO DIFF WBC: CPT | Performed by: NURSE PRACTITIONER

## 2023-05-15 PROCEDURE — 99284 EMERGENCY DEPT VISIT MOD MDM: CPT | Mod: 25

## 2023-05-15 PROCEDURE — 81001 URINALYSIS AUTO W/SCOPE: CPT | Performed by: NURSE PRACTITIONER

## 2023-05-15 RX ORDER — CEFUROXIME AXETIL 250 MG/1
250 TABLET ORAL EVERY 12 HOURS
Qty: 10 TABLET | Refills: 0 | Status: SHIPPED | OUTPATIENT
Start: 2023-05-15 | End: 2023-05-20

## 2023-05-15 NOTE — ED PROVIDER NOTES
"Encounter Date: 5/15/2023       History     Chief Complaint   Patient presents with    Hematuria     This AM, Hx of prostate cancer     Melvin Powers is a 89 year old male  with pmh CKD, CAD, GERD, IBS, prostate CA presenting to the ED with c/o hematuria. The patient states that he began having symptoms today without abdominal pain, fever, dysuria. He has had some difficulty passing urine with noted clots. He has had no flank pain, dizziness, weakness. He takes Plavix and has been taking as prescribed.     Review of patient's allergies indicates:   Allergen Reactions    Atorvastatin Other (See Comments)     Other reaction(s): Muscle pain    Codeine      Other reaction(s): makes him"goofey"    Contrast media     Iodinated contrast media      Other reaction(s): Rash     Past Medical History:   Diagnosis Date    Cataract     CKD (chronic kidney disease) stage 3, GFR 30-59 ml/min 3/16/2014    Coronary artery disease     GERD (gastroesophageal reflux disease)     Irritable bowel syndrome with constipation 11/30/2016     Past Surgical History:   Procedure Laterality Date    APPENDECTOMY      COLONOSCOPY      CYSTOSCOPY      pterygium removal Left     UPPER GASTROINTESTINAL ENDOSCOPY       Family History   Problem Relation Age of Onset    Rheum arthritis Mother     Cancer Father     Rheum arthritis Father     Cancer Sister         bone    Cancer Brother      Social History     Tobacco Use    Smoking status: Never    Smokeless tobacco: Never   Substance Use Topics    Alcohol use: Yes     Alcohol/week: 2.0 standard drinks     Types: 1 Glasses of wine, 1 Cans of beer per week    Drug use: No     Review of Systems   Constitutional:  Negative for fever.   HENT:  Negative for sore throat.    Respiratory:  Negative for shortness of breath.    Cardiovascular:  Negative for chest pain.   Gastrointestinal:  Negative for nausea.   Genitourinary:  Positive for difficulty urinating and hematuria. Negative for dysuria, flank pain, " frequency and urgency.   Musculoskeletal:  Negative for back pain.   Skin:  Negative for rash.   Neurological:  Negative for weakness.   Hematological:  Does not bruise/bleed easily.     Physical Exam     Initial Vitals [05/15/23 1215]   BP Pulse Resp Temp SpO2   (!) 168/82 84 18 97.8 °F (36.6 °C) 96 %      MAP       --         Physical Exam    Nursing note and vitals reviewed.  Constitutional: He appears well-developed and well-nourished. He is not diaphoretic. No distress.   HENT:   Head: Normocephalic and atraumatic.   Mouth/Throat: Oropharynx is clear and moist.   Eyes: Conjunctivae are normal.   Neck: Neck supple.   Cardiovascular:  Normal rate, regular rhythm, normal heart sounds and intact distal pulses.     Exam reveals no gallop and no friction rub.       No murmur heard.  Pulmonary/Chest: Breath sounds normal. He has no wheezes. He has no rhonchi. He has no rales.   Abdominal: Abdomen is soft. He exhibits no distension. There is no abdominal tenderness.   Musculoskeletal:         General: Normal range of motion.      Cervical back: Neck supple.      Comments: No CVA tenderness to percussion     Neurological: He is alert and oriented to person, place, and time.   Skin: No rash noted. No erythema.       ED Course   Procedures  Labs Reviewed   URINALYSIS, REFLEX TO URINE CULTURE - Abnormal; Notable for the following components:       Result Value    Color, UA Red (*)     Appearance, UA Cloudy (*)     Occult Blood UA 3+ (*)     All other components within normal limits    Narrative:     Specimen Source->Urine   CBC W/ AUTO DIFFERENTIAL - Abnormal; Notable for the following components:    RBC 4.44 (*)     MCH 31.5 (*)     MPV 9.0 (*)     Lymph % 17.9 (*)     All other components within normal limits   COMPREHENSIVE METABOLIC PANEL - Abnormal; Notable for the following components:    Total Bilirubin 1.3 (*)     All other components within normal limits   URINALYSIS MICROSCOPIC - Abnormal; Notable for the  following components:    RBC, UA >100 (*)     Hyaline Casts, UA 3 (*)     All other components within normal limits    Narrative:     Specimen Source->Urine   PROTIME-INR          Imaging Results              CT Renal Stone Study ABD Pelvis WO (Final result)  Result time 05/15/23 15:05:05      Final result by Dexter Cristobal MD (05/15/23 15:05:05)                   Narrative:    CMS MANDATED QUALITY DATA - CT RADIATION - 436    All CT scans at this facility utilize dose modulation, iterative reconstruction, and/or weight based dosing when appropriate to reduce radiation dose to as low as reasonably achievable.        Reason: gross hematuria, history of prostate CA    TECHNIQUE: CT abdomen and pelvis with 100 mL Omnipaque 350.    COMPARISON: CT abdomen and pelvis November 21, 2021.    FINDINGS:    There is subsegmental atelectasis of the lung bases. Heart size is normal.    The liver is normal size. No gross hepatic lesions. The gallbladder and common bile duct are within normal limits. The pancreas, spleen and adrenal glands are unremarkable. The kidneys are normal size. A 1 mm nonobstructing calculus is noted at the midpole of the left kidney. There is no hydronephrosis bilaterally. The ureters are normal caliber without obstructions. The bladder is fluid-filled with no focal wall abnormalities. Prostate gland and seminal vesicles are unremarkable. Surgical clips the prostate gland is noted. There is no free fluid in the pelvis.    The large and small bowel are normal caliber. No bowel wall thickening or inflammatory changes. Stomach is unremarkable.    The abdominal aorta is normal caliber with atherosclerosis. Abdominal aorta is ectatic. No intra-abdominal lymphadenopathy. No mesenteric fat stranding or free fluid.    No acute osseous abnormality.    IMPRESSION:    1 mm nonobstructing calculus at the midpole the left kidney.    Electronically signed by:  Dexter Cristobal DO  5/15/2023 3:05 PM CDT Workstation:  [id number]                                     Medications - No data to display        APC / Resident Notes:   This is an urgent evaluation of a 89 year old man on Plavix with gross hematuria. He initially had difficulty passing urine with gross hematuria noted. He had 231 ml of urine in the bladder post-void. He was able to pass urine freely without any intervention. Urine was noted to be clear at time of discharge. He has no CVA tenderness to percussion and no abdominal tenderness to palpation. Labs reviewed with results as follows:  05/15/23 12:51  WBC: 5.35  RBC: 4.44 (L)  Hemoglobin: 14.0  Hematocrit: 40.7  MCV: 92  MCH: 31.5 (H)  MCHC: 34.4  RDW: 13.7  Platelets: 169  05/15/23 12:51  Sodium: 138  Potassium: 4.1  Chloride: 101  CO2: 24  Anion Gap: 13  BUN: 23  Creatinine: 1.1  05/15/23 13:13  Color, UA: Red !  Appearance, UA: Cloudy !  Specific Gravity, UA: 1.015  pH, UA: 5.0  Protein, UA: SEE COMMENT  Glucose, UA: SEE COMMENT  Ketones, UA: SEE COMMENT  Occult Blood UA: 3+ !  NITRITE UA: SEE COMMENT  UROBILINOGEN UA: SEE COMMENT  Bilirubin (UA): SEE COMMENT  Leukocytes, UA: SEE COMMENT  RBC, UA: >100 (H)  WBC, UA: 4  Bacteria, UA: Negative  Squam Epithel, UA: 1      H&H is stable and he has no VINCENT. Vital signs remained stable while in the ED with no hypotension or tachycardia.   CT reviewed with radiology report as follows:   1 mm nonobstructing calculus at the midpole the left kidney.    I spoke with the patient's urologist, Dr. Sargent who recommends no further intervention at this time. He has no gross hematuria at discharge. I will start Ceftin prophylactically and advise the patient to follow up with urology in one week as instructed per Dr. Sargent. Strict ED return precautions also discussed and he verbalized understanding. Based on my clinical evaluation, I do not appreciate any immediate, emergent, or life threatening condition or etiology that warrants additional workup today and feel that  the patient can be discharged with close follow up care.          Attending Attestation:     Physician Attestation Statement for NP/PA:   I discussed this assessment and plan of this patient with the NP/PA, but I did not personally examine the patient. The face to face encounter was performed by the NP/PA.                         Clinical Impression:   Final diagnoses:  [R31.9] Hematuria, unspecified type (Primary)  [Z79.01] Anticoagulant long-term use        ED Disposition Condition    Discharge Stable          ED Prescriptions       Medication Sig Dispense Start Date End Date Auth. Provider    cefUROXime (CEFTIN) 250 MG tablet Take 1 tablet (250 mg total) by mouth every 12 (twelve) hours. for 5 days 10 tablet 5/15/2023 5/20/2023 Kylie Dos Santos NP          Follow-up Information       Follow up With Specialties Details Why Contact Info Additional Information    [facility] - Emergency Dept Emergency Medicine  As needed, If symptoms worsen [address] [id number]  [phone] 1st floor    Lauri Santos MD Family Medicine, Home Health Services, Hospice Services Schedule an appointment as soon as possible for a visit  As needed [address]  [facility] [address]  [phone]       Pedro Sargent MD Urology Schedule an appointment as soon as possible for a visit in 1 week  [address]  [address]  [facility] [address]  [phone]                Kylie Dos Santos NP  05/15/23 1731       Antonia Stone MD  05/16/23 0056

## 2023-08-17 ENCOUNTER — OFFICE VISIT (OUTPATIENT)
Dept: FAMILY MEDICINE | Facility: CLINIC | Age: 88
End: 2023-08-17
Payer: MEDICARE

## 2023-08-17 ENCOUNTER — TELEPHONE (OUTPATIENT)
Dept: FAMILY MEDICINE | Facility: CLINIC | Age: 88
End: 2023-08-17

## 2023-08-17 VITALS
HEART RATE: 75 BPM | OXYGEN SATURATION: 96 % | BODY MASS INDEX: 25.16 KG/M2 | SYSTOLIC BLOOD PRESSURE: 140 MMHG | HEIGHT: 65 IN | DIASTOLIC BLOOD PRESSURE: 70 MMHG | RESPIRATION RATE: 16 BRPM | WEIGHT: 151 LBS

## 2023-08-17 DIAGNOSIS — R60.0 LOCALIZED EDEMA: ICD-10-CM

## 2023-08-17 DIAGNOSIS — R60.0 LOCALIZED EDEMA: Primary | ICD-10-CM

## 2023-08-17 DIAGNOSIS — H11.32 BURST BLOOD VESSEL IN EYE, LEFT: ICD-10-CM

## 2023-08-17 PROCEDURE — 1159F MED LIST DOCD IN RCRD: CPT | Mod: CPTII,S$GLB,,

## 2023-08-17 PROCEDURE — 99213 OFFICE O/P EST LOW 20 MIN: CPT | Mod: S$GLB,,,

## 2023-08-17 PROCEDURE — 3288F PR FALLS RISK ASSESSMENT DOCUMENTED: ICD-10-PCS | Mod: CPTII,S$GLB,,

## 2023-08-17 PROCEDURE — 1126F PR PAIN SEVERITY QUANTIFIED, NO PAIN PRESENT: ICD-10-PCS | Mod: CPTII,S$GLB,,

## 2023-08-17 PROCEDURE — 1101F PT FALLS ASSESS-DOCD LE1/YR: CPT | Mod: CPTII,S$GLB,,

## 2023-08-17 PROCEDURE — 1126F AMNT PAIN NOTED NONE PRSNT: CPT | Mod: CPTII,S$GLB,,

## 2023-08-17 PROCEDURE — 99213 PR OFFICE/OUTPT VISIT, EST, LEVL III, 20-29 MIN: ICD-10-PCS | Mod: S$GLB,,,

## 2023-08-17 PROCEDURE — 1101F PR PT FALLS ASSESS DOC 0-1 FALLS W/OUT INJ PAST YR: ICD-10-PCS | Mod: CPTII,S$GLB,,

## 2023-08-17 PROCEDURE — 1160F PR REVIEW ALL MEDS BY PRESCRIBER/CLIN PHARMACIST DOCUMENTED: ICD-10-PCS | Mod: CPTII,S$GLB,,

## 2023-08-17 PROCEDURE — 1160F RVW MEDS BY RX/DR IN RCRD: CPT | Mod: CPTII,S$GLB,,

## 2023-08-17 PROCEDURE — 3288F FALL RISK ASSESSMENT DOCD: CPT | Mod: CPTII,S$GLB,,

## 2023-08-17 PROCEDURE — 1159F PR MEDICATION LIST DOCUMENTED IN MEDICAL RECORD: ICD-10-PCS | Mod: CPTII,S$GLB,,

## 2023-08-17 RX ORDER — POTASSIUM CHLORIDE 750 MG/1
10 TABLET, EXTENDED RELEASE ORAL DAILY PRN
Qty: 30 TABLET | Refills: 1 | Status: SHIPPED | OUTPATIENT
Start: 2023-08-17

## 2023-08-17 RX ORDER — FUROSEMIDE 20 MG/1
20 TABLET ORAL DAILY PRN
Qty: 30 TABLET | Refills: 2 | Status: SHIPPED | OUTPATIENT
Start: 2023-08-17

## 2023-08-17 RX ORDER — FUROSEMIDE 20 MG/1
20 TABLET ORAL DAILY PRN
Qty: 30 TABLET | Refills: 2 | Status: SHIPPED | OUTPATIENT
Start: 2023-08-17 | End: 2023-08-17 | Stop reason: SDUPTHER

## 2023-08-17 RX ORDER — LATANOPROST 50 UG/ML
1 SOLUTION/ DROPS OPHTHALMIC NIGHTLY
COMMUNITY
Start: 2023-04-03

## 2023-08-17 NOTE — TELEPHONE ENCOUNTER
----- Message from Irais Lomax sent at 8/17/2023 11:36 AM CDT -----  Patient daughter Gregoria called and stated that she missed a call from the nurse, please give her a call back [phone]

## 2023-08-17 NOTE — TELEPHONE ENCOUNTER
----- Message from Irais Lomax sent at 8/17/2023 11:16 AM CDT -----  Patient daughter called and stated that the patient feet are all swollen and his eyes are real red,and she would like to know if he need to come in today or can the doctor call something in she stated that he use to take Lasix years ago but he do not take them anymore. Please give her a call at [phone]

## 2023-08-25 NOTE — PROGRESS NOTES
SUBJECTIVE:    Patient ID: Melvin Powers is a 89 y.o. male.    Chief Complaint: eye (Blood shot) and Foot Swelling    89-year-old established male patient presents to clinic today accompanied by his daughter with complaints of edema to his right ankle and redness to his left eye.  Patient denies headaches.  Denies dizziness.  Daughter denies mental status change.  Denies shortness of breath or chest pain.  Denies injury to ankle.        Admission on 05/15/2023, Discharged on 05/15/2023   Component Date Value Ref Range Status    Specimen UA 05/15/2023 Urine, Clean Catch   Final    Color, UA 05/15/2023 Red (A)  Yellow, Straw, Emily Final    Appearance, UA 05/15/2023 Cloudy (A)  Clear Final    pH, UA 05/15/2023 5.0  5.0 - 8.0 Final    Specific Gravity, UA 05/15/2023 1.015  1.005 - 1.030 Final    Protein, UA 05/15/2023 SEE COMMENT  Negative Final    Glucose, UA 05/15/2023 SEE COMMENT  Negative Final    Ketones, UA 05/15/2023 SEE COMMENT  Negative Final    Bilirubin (UA) 05/15/2023 SEE COMMENT  Negative Final    Occult Blood UA 05/15/2023 3+ (A)  Negative Final    Nitrite, UA 05/15/2023 SEE COMMENT  Negative Final    Urobilinogen, UA 05/15/2023 SEE COMMENT  Negative EU/dL Final    Leukocytes, UA 05/15/2023 SEE COMMENT  Negative Final    WBC 05/15/2023 5.35  3.90 - 12.70 K/uL Final    RBC 05/15/2023 4.44 (L)  4.60 - 6.20 M/uL Final    Hemoglobin 05/15/2023 14.0  14.0 - 18.0 g/dL Final    Hematocrit 05/15/2023 40.7  40.0 - 54.0 % Final    MCV 05/15/2023 92  82 - 98 fL Final    MCH 05/15/2023 31.5 (H)  27.0 - 31.0 pg Final    MCHC 05/15/2023 34.4  32.0 - 36.0 g/dL Final    RDW 05/15/2023 13.7  11.5 - 14.5 % Final    Platelets 05/15/2023 169  150 - 450 K/uL Final    MPV 05/15/2023 9.0 (L)  9.2 - 12.9 fL Final    Immature Granulocytes 05/15/2023 0.4  0.0 - 0.5 % Final    Gran # (ANC) 05/15/2023 3.8  1.8 - 7.7 K/uL Final    Immature Grans (Abs) 05/15/2023 0.02  0.00 - 0.04 K/uL Final    Lymph # 05/15/2023 1.0  1.0 - 4.8  K/uL Final    Mono # 05/15/2023 0.5  0.3 - 1.0 K/uL Final    Eos # 05/15/2023 0.1  0.0 - 0.5 K/uL Final    Baso # 05/15/2023 0.02  0.00 - 0.20 K/uL Final    nRBC 05/15/2023 0  0 /100 WBC Final    Gran % 05/15/2023 70.1  38.0 - 73.0 % Final    Lymph % 05/15/2023 17.9 (L)  18.0 - 48.0 % Final    Mono % 05/15/2023 9.7  4.0 - 15.0 % Final    Eosinophil % 05/15/2023 1.5  0.0 - 8.0 % Final    Basophil % 05/15/2023 0.4  0.0 - 1.9 % Final    Differential Method 05/15/2023 Automated   Final    Sodium 05/15/2023 138  136 - 145 mmol/L Final    Potassium 05/15/2023 4.1  3.5 - 5.1 mmol/L Final    Chloride 05/15/2023 101  95 - 110 mmol/L Final    CO2 05/15/2023 24  23 - 29 mmol/L Final    Glucose 05/15/2023 102  70 - 110 mg/dL Final    BUN 05/15/2023 23  10 - 30 mg/dL Final    Creatinine 05/15/2023 1.1  0.5 - 1.4 mg/dL Final    Calcium 05/15/2023 9.9  8.7 - 10.5 mg/dL Final    Total Protein 05/15/2023 7.4  6.0 - 8.4 g/dL Final    Albumin 05/15/2023 4.4  3.5 - 5.2 g/dL Final    Total Bilirubin 05/15/2023 1.3 (H)  0.1 - 1.0 mg/dL Final    Alkaline Phosphatase 05/15/2023 60  55 - 135 U/L Final    AST 05/15/2023 32  10 - 40 U/L Final    ALT 05/15/2023 28  10 - 44 U/L Final    Anion Gap 05/15/2023 13  8 - 16 mmol/L Final    eGFR 05/15/2023 >60.0  >60 mL/min/1.73 m^2 Final    Prothrombin Time 05/15/2023 11.2  9.0 - 12.5 sec Final    INR 05/15/2023 1.1  0.8 - 1.2 Final    RBC, UA 05/15/2023 >100 (H)  0 - 4 /hpf Final    WBC, UA 05/15/2023 4  0 - 5 /hpf Final    Bacteria 05/15/2023 Negative  None-Occ /hpf Final    Squam Epithel, UA 05/15/2023 1  /hpf Final    Hyaline Casts, UA 05/15/2023 3 (A)  0-1/lpf /lpf Final    Microscopic Comment 05/15/2023 SEE COMMENT   Final       Past Medical History:   Diagnosis Date    Cataract     CKD (chronic kidney disease) stage 3, GFR 30-59 ml/min 3/16/2014    Coronary artery disease     GERD (gastroesophageal reflux disease)     Irritable bowel syndrome with constipation 11/30/2016     Social History  "    Socioeconomic History    Marital status:    Tobacco Use    Smoking status: Never    Smokeless tobacco: Never   Substance and Sexual Activity    Alcohol use: Yes     Alcohol/week: 2.0 standard drinks of alcohol     Types: 1 Glasses of wine, 1 Cans of beer per week    Drug use: No     Past Surgical History:   Procedure Laterality Date    APPENDECTOMY      COLONOSCOPY      CYSTOSCOPY      pterygium removal Left     UPPER GASTROINTESTINAL ENDOSCOPY       Family History   Problem Relation Age of Onset    Rheum arthritis Mother     Cancer Father     Rheum arthritis Father     Cancer Sister         bone    Cancer Brother        Review of patient's allergies indicates:   Allergen Reactions    Atorvastatin Other (See Comments)     Other reaction(s): Muscle pain    Codeine      Other reaction(s): makes him"goofey"    Contrast media     Iodinated contrast media      Other reaction(s): Rash       Current Outpatient Medications:     cholecalciferol, vitamin D3, (VITAMIN D3) 25 mcg (1,000 unit) capsule, Take 1,000 Units by mouth once daily., Disp: , Rfl:     clopidogreL (PLAVIX) 75 mg tablet, Take 1 tablet (75 mg total) by mouth once daily., Disp: 90 tablet, Rfl: 3    clotrimazole-betamethasone (LOTRISONE) lotion, Apply topically 2 (two) times daily., Disp: 180 mL, Rfl: 1    coenzyme Q10 100 mg capsule, Take 200 mg by mouth once daily., Disp: , Rfl:     donepeziL (ARICEPT) 10 MG tablet, Take 1 tablet (10 mg total) by mouth every evening., Disp: 30 tablet, Rfl: 5    esomeprazole (NEXIUM) 40 MG capsule, Nexium 40 mg capsule,delayed release  Take 1 capsule every day by oral route as needed., Disp: 90 capsule, Rfl: 3    latanoprost 0.005 % ophthalmic solution, Place 1 drop into both eyes every evening., Disp: , Rfl:     levothyroxine (SYNTHROID) 75 MCG tablet, Take 1 tablet (75 mcg total) by mouth once daily., Disp: 90 tablet, Rfl: 3    linaCLOtide (LINZESS) 72 mcg Cap capsule, Take 1 capsule (72 mcg total) by mouth before " breakfast., Disp: 30 capsule, Rfl: 5    lisinopriL 10 MG tablet, Take 0.5 tablets (5 mg total) by mouth once daily., Disp: 45 tablet, Rfl: 1    magnesium gluconate 27.5 mg (500 mg) Tab, Take 500 mg by mouth once daily., Disp: , Rfl:     meclizine (ANTIVERT) 25 mg tablet, Take 1 tablet (25 mg total) by mouth 3 (three) times daily as needed., Disp: 60 tablet, Rfl: 0    multivitamin (THERAGRAN) per tablet, Take 1 tablet by mouth once daily., Disp: 100 tablet, Rfl: 3    nitroGLYCERIN 0.1 mg/hr TD PT24 (NITRODUR) 0.1 mg/hr PT24, Place 1 patch onto the skin once daily., Disp: , Rfl: 11    rosuvastatin (CRESTOR) 40 MG Tab, Take 1 tablet (40 mg total) by mouth once daily., Disp: 90 tablet, Rfl: 3    UNABLE TO FIND, Take 1 tablet by mouth once daily. PREVAGEN, Disp: , Rfl:     vitamin E 400 UNIT capsule, Take 400 Units by mouth once daily., Disp: , Rfl:     furosemide (LASIX) 20 MG tablet, Take 1 tablet (20 mg total) by mouth daily as needed (swelling)., Disp: 30 tablet, Rfl: 2    potassium chloride SA (K-DUR,KLOR-CON M) 10 MEQ tablet, Take 1 tablet (10 mEq total) by mouth daily as needed (take one tablet any time you take furosemide)., Disp: 30 tablet, Rfl: 1    Review of Systems   Constitutional:  Negative for activity change, appetite change, chills, fatigue, fever and unexpected weight change.   HENT:  Negative for nasal congestion, ear pain, rhinorrhea, sore throat and trouble swallowing.    Eyes:  Positive for redness. Negative for pain, discharge and visual disturbance.   Respiratory:  Negative for cough, chest tightness, shortness of breath and wheezing.    Cardiovascular:  Negative for chest pain, palpitations and leg swelling.   Gastrointestinal:  Negative for abdominal pain, blood in stool, constipation, diarrhea, nausea, vomiting and reflux.   Genitourinary:  Negative for bladder incontinence, dysuria, frequency, hematuria and urgency.   Musculoskeletal:  Positive for joint swelling. Negative for arthralgias,  "gait problem and myalgias.   Integumentary:  Negative for rash.   Neurological:  Negative for dizziness, tremors, weakness, light-headedness, numbness and headaches.   Hematological:  Does not bruise/bleed easily.   Psychiatric/Behavioral:  Negative for dysphoric mood, sleep disturbance and suicidal ideas. The patient is not nervous/anxious.            Objective:      Vitals:    08/17/23 1417   BP: (!) 140/70   Pulse: 75   Resp: 16   SpO2: 96%   Weight: 68.5 kg (151 lb)   Height: 5' 5" (1.651 m)     Physical Exam  Vitals and nursing note reviewed.   Constitutional:       General: He is not in acute distress.     Appearance: Normal appearance. He is well-developed and normal weight.   HENT:      Head: Normocephalic and atraumatic.      Nose: Nose normal.   Eyes:      Comments: Right eye within normal limits.  Left eye sclera with diabetes blood consistent with a burst blood vessel superficially.  There is no tenderness or edema.  There is no erythema to the orbit.   Neck:      Thyroid: No thyromegaly.      Vascular: No carotid bruit.   Cardiovascular:      Rate and Rhythm: Normal rate and regular rhythm.      Pulses: Normal pulses.      Heart sounds: Normal heart sounds.   Pulmonary:      Effort: Pulmonary effort is normal.      Breath sounds: Normal breath sounds. No wheezing or rales.   Abdominal:      Palpations: Abdomen is soft.   Musculoskeletal:         General: Normal range of motion.      Cervical back: Normal range of motion and neck supple.      Lumbar back: Normal. No spasms.      Right lower leg: Edema present.      Left lower leg: Edema present.      Comments: Very mild, trace edema to bilateral ankles.   Skin:     General: Skin is warm and dry.      Capillary Refill: Capillary refill takes 2 to 3 seconds.      Coloration: Skin is not jaundiced or pale.      Findings: No lesion or rash.   Neurological:      General: No focal deficit present.      Mental Status: He is alert and oriented to person, place, " and time.      Cranial Nerves: No cranial nerve deficit.      Coordination: Coordination normal.   Psychiatric:         Mood and Affect: Mood normal.           Assessment:       1. Localized edema    2. Burst blood vessel in eye, left         Plan:       Localized edema  Advised patient to take furosemide as prescribed.  Refills of potassium  -     potassium chloride SA (K-DUR,KLOR-CON M) 10 MEQ tablet; Take 1 tablet (10 mEq total) by mouth daily as needed (take one tablet any time you take furosemide).  Dispense: 30 tablet; Refill: 1    Burst blood vessel in eye, left  Advised patient that this is not a harmful condition and the blood will go away on its own.  If his vision changes he should go to the emergency room.      Follow up if symptoms worsen or fail to improve.        8/25/2023 Antonia Dunham

## 2023-10-24 ENCOUNTER — OFFICE VISIT (OUTPATIENT)
Dept: FAMILY MEDICINE | Facility: CLINIC | Age: 88
End: 2023-10-24
Attending: FAMILY MEDICINE
Payer: MEDICARE

## 2023-10-24 VITALS
HEIGHT: 65 IN | SYSTOLIC BLOOD PRESSURE: 120 MMHG | BODY MASS INDEX: 25.49 KG/M2 | HEART RATE: 85 BPM | DIASTOLIC BLOOD PRESSURE: 60 MMHG | WEIGHT: 153 LBS

## 2023-10-24 DIAGNOSIS — M51.36 DDD (DEGENERATIVE DISC DISEASE), LUMBAR: ICD-10-CM

## 2023-10-24 DIAGNOSIS — I10 WHITE COAT SYNDROME WITH DIAGNOSIS OF HYPERTENSION: ICD-10-CM

## 2023-10-24 DIAGNOSIS — N40.0 BENIGN PROSTATIC HYPERPLASIA, UNSPECIFIED WHETHER LOWER URINARY TRACT SYMPTOMS PRESENT: ICD-10-CM

## 2023-10-24 DIAGNOSIS — Z85.46 HISTORY OF PROSTATE CANCER: ICD-10-CM

## 2023-10-24 DIAGNOSIS — N18.30 STAGE 3 CHRONIC KIDNEY DISEASE, UNSPECIFIED WHETHER STAGE 3A OR 3B CKD: Chronic | ICD-10-CM

## 2023-10-24 DIAGNOSIS — M54.16 LUMBAR RADICULOPATHY: ICD-10-CM

## 2023-10-24 DIAGNOSIS — K58.1 IRRITABLE BOWEL SYNDROME WITH CONSTIPATION: Chronic | ICD-10-CM

## 2023-10-24 DIAGNOSIS — I10 ESSENTIAL HYPERTENSION: ICD-10-CM

## 2023-10-24 DIAGNOSIS — E78.00 HYPERCHOLESTEREMIA: Chronic | ICD-10-CM

## 2023-10-24 DIAGNOSIS — E03.9 ACQUIRED HYPOTHYROIDISM: ICD-10-CM

## 2023-10-24 DIAGNOSIS — K21.9 GASTROESOPHAGEAL REFLUX DISEASE WITHOUT ESOPHAGITIS: Chronic | ICD-10-CM

## 2023-10-24 DIAGNOSIS — K52.0 RADIATION COLITIS: Chronic | ICD-10-CM

## 2023-10-24 DIAGNOSIS — R30.0 DYSURIA: ICD-10-CM

## 2023-10-24 DIAGNOSIS — I25.84 CORONARY ARTERY DISEASE DUE TO CALCIFIED CORONARY LESION: ICD-10-CM

## 2023-10-24 DIAGNOSIS — Z23 NEED FOR VACCINATION: ICD-10-CM

## 2023-10-24 DIAGNOSIS — I25.10 ATHEROSCLEROSIS OF NATIVE CORONARY ARTERY OF NATIVE HEART WITHOUT ANGINA PECTORIS: Primary | ICD-10-CM

## 2023-10-24 DIAGNOSIS — I25.10 CORONARY ARTERY DISEASE DUE TO CALCIFIED CORONARY LESION: ICD-10-CM

## 2023-10-24 LAB
BILIRUB SERPL-MCNC: NORMAL MG/DL
BILIRUB UR QL STRIP: NEGATIVE
BLOOD, POC UA: NORMAL
GLUCOSE UR QL STRIP: NEGATIVE
GLUCOSE UR QL STRIP: NORMAL
KETONES UR QL STRIP: NEGATIVE
KETONES UR QL STRIP: NORMAL
LEUKOCYTE ESTERASE UR QL STRIP: NEGATIVE
LEUKOCYTE ESTERASE URINE, POC: NORMAL
NITRITE, POC UA: NORMAL
PH, POC UA: 6
PH, POC UA: NORMAL
POC BLOOD, URINE: NEGATIVE
POC NITRATES, URINE: NEGATIVE
PROT UR QL STRIP: NEGATIVE
PROTEIN, POC: NORMAL
SP GR UR STRIP: 1.02 (ref 1–1.03)
SPECIFIC GRAVITY, POC UA: NORMAL
UROBILINOGEN UR STRIP-ACNC: 0.2 (ref 0.3–2.2)
UROBILINOGEN, POC UA: NORMAL

## 2023-10-24 PROCEDURE — 99214 OFFICE O/P EST MOD 30 MIN: CPT | Mod: S$GLB,,, | Performed by: FAMILY MEDICINE

## 2023-10-24 PROCEDURE — 1159F PR MEDICATION LIST DOCUMENTED IN MEDICAL RECORD: ICD-10-PCS | Mod: CPTII,S$GLB,, | Performed by: FAMILY MEDICINE

## 2023-10-24 PROCEDURE — 3288F FALL RISK ASSESSMENT DOCD: CPT | Mod: CPTII,S$GLB,, | Performed by: FAMILY MEDICINE

## 2023-10-24 PROCEDURE — 81003 URINALYSIS AUTO W/O SCOPE: CPT | Mod: QW,S$GLB,, | Performed by: FAMILY MEDICINE

## 2023-10-24 PROCEDURE — G0008 FLU VACCINE - QUADRIVALENT - ADJUVANTED: ICD-10-PCS | Mod: S$GLB,,, | Performed by: FAMILY MEDICINE

## 2023-10-24 PROCEDURE — 90694 FLU VACCINE - QUADRIVALENT - ADJUVANTED: ICD-10-PCS | Mod: S$GLB,,, | Performed by: FAMILY MEDICINE

## 2023-10-24 PROCEDURE — 1159F MED LIST DOCD IN RCRD: CPT | Mod: CPTII,S$GLB,, | Performed by: FAMILY MEDICINE

## 2023-10-24 PROCEDURE — 1100F PTFALLS ASSESS-DOCD GE2>/YR: CPT | Mod: CPTII,S$GLB,, | Performed by: FAMILY MEDICINE

## 2023-10-24 PROCEDURE — 3288F PR FALLS RISK ASSESSMENT DOCUMENTED: ICD-10-PCS | Mod: CPTII,S$GLB,, | Performed by: FAMILY MEDICINE

## 2023-10-24 PROCEDURE — G0008 ADMIN INFLUENZA VIRUS VAC: HCPCS | Mod: S$GLB,,, | Performed by: FAMILY MEDICINE

## 2023-10-24 PROCEDURE — 99214 PR OFFICE/OUTPT VISIT, EST, LEVL IV, 30-39 MIN: ICD-10-PCS | Mod: S$GLB,,, | Performed by: FAMILY MEDICINE

## 2023-10-24 PROCEDURE — 90694 VACC AIIV4 NO PRSRV 0.5ML IM: CPT | Mod: S$GLB,,, | Performed by: FAMILY MEDICINE

## 2023-10-24 PROCEDURE — 1100F PR PT FALLS ASSESS DOC 2+ FALLS/FALL W/INJURY/YR: ICD-10-PCS | Mod: CPTII,S$GLB,, | Performed by: FAMILY MEDICINE

## 2023-10-24 PROCEDURE — 81003 POCT URINALYSIS: ICD-10-PCS | Mod: QW,S$GLB,, | Performed by: FAMILY MEDICINE

## 2023-10-24 RX ORDER — CIPROFLOXACIN 500 MG/1
500 TABLET ORAL 2 TIMES DAILY
Qty: 20 TABLET | Refills: 0 | Status: SHIPPED | OUTPATIENT
Start: 2023-10-24 | End: 2024-01-03

## 2023-10-24 RX ORDER — LISINOPRIL 10 MG/1
5 TABLET ORAL DAILY
Qty: 45 TABLET | Refills: 1 | Status: CANCELLED | OUTPATIENT
Start: 2023-10-24 | End: 2024-10-23

## 2023-10-25 NOTE — PROGRESS NOTES
SUBJECTIVE:    Patient ID: Melvin Powers is a 89 y.o. male.    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  He complains of 1 week of dysuria and some discomfort in the bladder.  He would like to do urinalysis today.    Chronic constipation, Linzess works well.    Mild memory loss, on donepezil 10 mg but does not take it every day.  Maybe once or twice a week.  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    Left eye has some vision decline and he will see -ophthalmology    Ambulates without a cane or a walker.  No recent falls.        Office Visit on 10/24/2023   Component Date Value Ref Range Status    POC Blood, Urine 10/24/2023 Negative  Negative Final    POC Bilirubin, Urine 10/24/2023 Negative  Negative Final    POC Urobilinogen, Urine 10/24/2023 0.2 (A)  0.3 - 2.2 Final    POC Ketones, Urine 10/24/2023 Negative  Negative Final    POC Protein, Urine 10/24/2023 Negative  Negative Final    POC Nitrates, Urine 10/24/2023 Negative  Negative Final    POC Glucose, Urine 10/24/2023 Negative  Negative Final    pH, UA 10/24/2023 6.0   Final    POC Specific Gravity, Urine 10/24/2023 1.025  1.003 - 1.029 Final    POC Leukocytes, Urine 10/24/2023 Negative  Negative Final   Admission on 05/15/2023, Discharged on 05/15/2023   Component Date Value Ref Range Status    Specimen UA 05/15/2023 Urine, Clean Catch   Final    Color, UA 05/15/2023 Red (A)  Yellow, Straw, Emily Final    Appearance, UA 05/15/2023 Cloudy (A)  Clear Final    pH, UA 05/15/2023 5.0  5.0 - 8.0 Final    Specific Gravity, UA 05/15/2023 1.015  1.005 - 1.030 Final    Protein, UA 05/15/2023 SEE COMMENT  Negative Final    Glucose, UA 05/15/2023 SEE COMMENT  Negative Final    Ketones, UA 05/15/2023 SEE COMMENT  Negative Final    Bilirubin (UA) 05/15/2023 SEE COMMENT  Negative  Final    Occult Blood UA 05/15/2023 3+ (A)  Negative Final    Nitrite, UA 05/15/2023 SEE COMMENT  Negative Final    Urobilinogen, UA 05/15/2023 SEE COMMENT  Negative EU/dL Final    Leukocytes, UA 05/15/2023 SEE COMMENT  Negative Final    WBC 05/15/2023 5.35  3.90 - 12.70 K/uL Final    RBC 05/15/2023 4.44 (L)  4.60 - 6.20 M/uL Final    Hemoglobin 05/15/2023 14.0  14.0 - 18.0 g/dL Final    Hematocrit 05/15/2023 40.7  40.0 - 54.0 % Final    MCV 05/15/2023 92  82 - 98 fL Final    MCH 05/15/2023 31.5 (H)  27.0 - 31.0 pg Final    MCHC 05/15/2023 34.4  32.0 - 36.0 g/dL Final    RDW 05/15/2023 13.7  11.5 - 14.5 % Final    Platelets 05/15/2023 169  150 - 450 K/uL Final    MPV 05/15/2023 9.0 (L)  9.2 - 12.9 fL Final    Immature Granulocytes 05/15/2023 0.4  0.0 - 0.5 % Final    Gran # (ANC) 05/15/2023 3.8  1.8 - 7.7 K/uL Final    Immature Grans (Abs) 05/15/2023 0.02  0.00 - 0.04 K/uL Final    Lymph # 05/15/2023 1.0  1.0 - 4.8 K/uL Final    Mono # 05/15/2023 0.5  0.3 - 1.0 K/uL Final    Eos # 05/15/2023 0.1  0.0 - 0.5 K/uL Final    Baso # 05/15/2023 0.02  0.00 - 0.20 K/uL Final    nRBC 05/15/2023 0  0 /100 WBC Final    Gran % 05/15/2023 70.1  38.0 - 73.0 % Final    Lymph % 05/15/2023 17.9 (L)  18.0 - 48.0 % Final    Mono % 05/15/2023 9.7  4.0 - 15.0 % Final    Eosinophil % 05/15/2023 1.5  0.0 - 8.0 % Final    Basophil % 05/15/2023 0.4  0.0 - 1.9 % Final    Differential Method 05/15/2023 Automated   Final    Sodium 05/15/2023 138  136 - 145 mmol/L Final    Potassium 05/15/2023 4.1  3.5 - 5.1 mmol/L Final    Chloride 05/15/2023 101  95 - 110 mmol/L Final    CO2 05/15/2023 24  23 - 29 mmol/L Final    Glucose 05/15/2023 102  70 - 110 mg/dL Final    BUN 05/15/2023 23  10 - 30 mg/dL Final    Creatinine 05/15/2023 1.1  0.5 - 1.4 mg/dL Final    Calcium 05/15/2023 9.9  8.7 - 10.5 mg/dL Final    Total Protein 05/15/2023 7.4  6.0 - 8.4 g/dL Final    Albumin 05/15/2023 4.4  3.5 - 5.2 g/dL Final    Total Bilirubin 05/15/2023 1.3 (H)  0.1 -  1.0 mg/dL Final    Alkaline Phosphatase 05/15/2023 60  55 - 135 U/L Final    AST 05/15/2023 32  10 - 40 U/L Final    ALT 05/15/2023 28  10 - 44 U/L Final    Anion Gap 05/15/2023 13  8 - 16 mmol/L Final    eGFR 05/15/2023 >60.0  >60 mL/min/1.73 m^2 Final    Prothrombin Time 05/15/2023 11.2  9.0 - 12.5 sec Final    INR 05/15/2023 1.1  0.8 - 1.2 Final    RBC, UA 05/15/2023 >100 (H)  0 - 4 /hpf Final    WBC, UA 05/15/2023 4  0 - 5 /hpf Final    Bacteria 05/15/2023 Negative  None-Occ /hpf Final    Squam Epithel, UA 05/15/2023 1  /hpf Final    Hyaline Casts, UA 05/15/2023 3 (A)  0-1/lpf /lpf Final    Microscopic Comment 05/15/2023 SEE COMMENT   Final       Past Medical History:   Diagnosis Date    Cataract     CKD (chronic kidney disease) stage 3, GFR 30-59 ml/min 3/16/2014    Coronary artery disease     GERD (gastroesophageal reflux disease)     Irritable bowel syndrome with constipation 11/30/2016     Social History     Socioeconomic History    Marital status:    Tobacco Use    Smoking status: Never    Smokeless tobacco: Never   Substance and Sexual Activity    Alcohol use: Yes     Alcohol/week: 2.0 standard drinks of alcohol     Types: 1 Glasses of wine, 1 Cans of beer per week    Drug use: No     Past Surgical History:   Procedure Laterality Date    APPENDECTOMY      COLONOSCOPY      CYSTOSCOPY      pterygium removal Left     UPPER GASTROINTESTINAL ENDOSCOPY       Family History   Problem Relation Age of Onset    Rheum arthritis Mother     Cancer Father     Rheum arthritis Father     Cancer Sister         bone    Cancer Brother        The CVD Risk score (D'Agostino, et al., 2008) failed to calculate for the following reasons:    The 2008 CVD risk score is only valid for ages 30 to 74    All of your core healthy metrics are met.      Review of patient's allergies indicates:   Allergen Reactions    Atorvastatin Other (See Comments)     Other reaction(s): Muscle pain    Codeine      Other reaction(s): makes  "him"goofey"    Contrast media     Iodinated contrast media      Other reaction(s): Rash       Current Outpatient Medications:     cholecalciferol, vitamin D3, (VITAMIN D3) 25 mcg (1,000 unit) capsule, Take 1,000 Units by mouth once daily., Disp: , Rfl:     clopidogreL (PLAVIX) 75 mg tablet, Take 1 tablet (75 mg total) by mouth once daily., Disp: 90 tablet, Rfl: 3    clotrimazole-betamethasone (LOTRISONE) lotion, Apply topically 2 (two) times daily., Disp: 180 mL, Rfl: 1    coenzyme Q10 100 mg capsule, Take 200 mg by mouth once daily., Disp: , Rfl:     esomeprazole (NEXIUM) 40 MG capsule, Nexium 40 mg capsule,delayed release  Take 1 capsule every day by oral route as needed., Disp: 90 capsule, Rfl: 3    furosemide (LASIX) 20 MG tablet, Take 1 tablet (20 mg total) by mouth daily as needed (swelling)., Disp: 30 tablet, Rfl: 2    latanoprost 0.005 % ophthalmic solution, Place 1 drop into both eyes every evening., Disp: , Rfl:     levothyroxine (SYNTHROID) 75 MCG tablet, Take 1 tablet (75 mcg total) by mouth once daily., Disp: 90 tablet, Rfl: 3    linaCLOtide (LINZESS) 72 mcg Cap capsule, Take 1 capsule (72 mcg total) by mouth before breakfast., Disp: 30 capsule, Rfl: 5    magnesium gluconate 27.5 mg (500 mg) Tab, Take 500 mg by mouth once daily., Disp: , Rfl:     meclizine (ANTIVERT) 25 mg tablet, Take 1 tablet (25 mg total) by mouth 3 (three) times daily as needed., Disp: 60 tablet, Rfl: 0    multivitamin (THERAGRAN) per tablet, Take 1 tablet by mouth once daily., Disp: 100 tablet, Rfl: 3    nitroGLYCERIN 0.1 mg/hr TD PT24 (NITRODUR) 0.1 mg/hr PT24, Place 1 patch onto the skin once daily., Disp: , Rfl: 11    potassium chloride SA (K-DUR,KLOR-CON M) 10 MEQ tablet, Take 1 tablet (10 mEq total) by mouth daily as needed (take one tablet any time you take furosemide)., Disp: 30 tablet, Rfl: 1    rosuvastatin (CRESTOR) 40 MG Tab, Take 1 tablet (40 mg total) by mouth once daily., Disp: 90 tablet, Rfl: 3    UNABLE TO FIND, " "Take 1 tablet by mouth once daily. PREVAGEN, Disp: , Rfl:     vitamin E 400 UNIT capsule, Take 400 Units by mouth once daily., Disp: , Rfl:     ciprofloxacin HCl (CIPRO) 500 MG tablet, Take 1 tablet (500 mg total) by mouth 2 (two) times daily., Disp: 20 tablet, Rfl: 0    donepeziL (ARICEPT) 10 MG tablet, Take 1 tablet (10 mg total) by mouth every evening., Disp: 30 tablet, Rfl: 5    lisinopriL 10 MG tablet, Take 0.5 tablets (5 mg total) by mouth once daily., Disp: 45 tablet, Rfl: 1    Review of Systems   Constitutional:  Negative for appetite change, chills, fatigue, fever and unexpected weight change.   HENT:  Negative for ear pain and trouble swallowing.    Eyes:  Negative for pain, discharge and visual disturbance.   Respiratory:  Negative for apnea, cough, shortness of breath and wheezing.    Cardiovascular:  Negative for chest pain and leg swelling.   Gastrointestinal:  Positive for abdominal pain. Negative for blood in stool, constipation, diarrhea, nausea, vomiting and reflux.   Endocrine: Negative for cold intolerance, heat intolerance and polydipsia.   Genitourinary:  Positive for dysuria and frequency. Negative for bladder incontinence, erectile dysfunction, hematuria, testicular pain and urgency.   Musculoskeletal:  Negative for gait problem, joint swelling and myalgias.   Neurological:  Negative for dizziness, seizures and numbness.   Psychiatric/Behavioral:  Negative for agitation, behavioral problems and hallucinations. The patient is not nervous/anxious.            Objective:      Vitals:    10/24/23 1106   BP: 120/60   Pulse: 85   Weight: 69.4 kg (153 lb)   Height: 5' 5" (1.651 m)     Physical Exam  Vitals and nursing note reviewed.   Constitutional:       General: He is not in acute distress.     Appearance: Normal appearance. He is well-developed. He is not toxic-appearing.   HENT:      Head: Normocephalic and atraumatic.      Right Ear: Tympanic membrane and external ear normal.      Left Ear: " Tympanic membrane and external ear normal.      Nose: Nose normal.      Mouth/Throat:      Pharynx: Oropharynx is clear.   Eyes:      Pupils: Pupils are equal, round, and reactive to light.   Neck:      Thyroid: No thyromegaly.      Vascular: No carotid bruit.   Cardiovascular:      Rate and Rhythm: Normal rate and regular rhythm.      Heart sounds: Normal heart sounds. No murmur heard.  Pulmonary:      Effort: Pulmonary effort is normal.      Breath sounds: Normal breath sounds. No wheezing or rales.   Abdominal:      General: Bowel sounds are normal. There is no distension.      Palpations: Abdomen is soft.      Tenderness: There is no abdominal tenderness.   Musculoskeletal:         General: No tenderness or deformity. Normal range of motion.      Cervical back: Normal range of motion and neck supple.      Lumbar back: Normal. No spasms.      Comments: Bends 90 degrees at  waist, shoulders and knees good range of motion without crepitance no pitting edema to lower extremities   Lymphadenopathy:      Cervical: No cervical adenopathy.   Skin:     General: Skin is warm and dry.      Findings: No rash.   Neurological:      Mental Status: He is alert and oriented to person, place, and time.      Cranial Nerves: No cranial nerve deficit.      Coordination: Coordination normal.      Gait: Gait normal.   Psychiatric:         Mood and Affect: Mood normal.         Behavior: Behavior normal.         Thought Content: Thought content normal.         Judgment: Judgment normal.           Assessment:       1. Atherosclerosis of native coronary artery of native heart without angina pectoris    2. Need for vaccination    3. Coronary artery disease    4. Hypercholesteremia    5. Stage 3 chronic kidney disease, unspecified whether stage 3a or 3b CKD    6. Acquired hypothyroidism    7. History of prostate cancer    8. Irritable bowel syndrome with constipation    9. Dysuria    10. DDD (degenerative disc disease), lumbar    11. Lumbar  radiculopathy    12. Essential hypertension    13. White coat syndrome with diagnosis of hypertension    14. Benign prostatic hyperplasia, unspecified whether lower urinary tract symptoms present    15. 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  Need for vaccination  -     Influenza (FLUAD) - Quadrivalent (Adjuvanted) *Preferred* (65+) (PF)  Flu vaccine today  Coronary artery disease    Hypercholesteremia  Will repeat lab work in 6 months  Stage 3 chronic kidney disease, unspecified whether stage 3a or 3b CKD    Acquired hypothyroidism    History of prostate cancer  Dr. Sargent follows his yearly PSA.  History of radiation treatment  Irritable bowel syndrome with constipation    Dysuria  -     POCT URINALYSIS  -     ciprofloxacin HCl (CIPRO) 500 MG tablet; Take 1 tablet (500 mg total) by mouth 2 (two) times daily.  Dispense: 20 tablet; Refill: 0  -     Cancel: Urine culture  -     POCT Urinalysis, Dipstick, Automated, W/O Scope  -     Urine culture  Will get urinalysis today treat empirically with Cipro and await urine culture  DDD (degenerative disc disease), lumbar    Lumbar radiculopathy    Essential hypertension  Blood pressure well controlled  White coat syndrome with diagnosis of hypertension    Benign prostatic hyperplasia, unspecified whether lower urinary tract symptoms present    Gastroesophageal reflux disease without esophagitis    Radiation colitis      No follow-ups on file.        10/24/2023 Lauri Santos

## 2023-10-26 LAB — BACTERIA UR CULT: NORMAL

## 2023-10-27 ENCOUNTER — TELEPHONE (OUTPATIENT)
Dept: FAMILY MEDICINE | Facility: CLINIC | Age: 88
End: 2023-10-27

## 2023-10-27 NOTE — TELEPHONE ENCOUNTER
Spoke with patients wife and let them know urine culture shows mixed genital salud - no significant infection. She states she doesn't think he is still having symptoms but he is in the shower. Will call back if he is.   Pulled from [employer] and scanned in media.

## 2023-10-27 NOTE — TELEPHONE ENCOUNTER
----- Message from Aniyah Dee sent at 10/27/2023 10:49 AM CDT -----  Pt called to get results of his urine test.  [phone]

## 2023-10-31 ENCOUNTER — TELEPHONE (OUTPATIENT)
Dept: FAMILY MEDICINE | Facility: CLINIC | Age: 88
End: 2023-10-31

## 2023-10-31 NOTE — TELEPHONE ENCOUNTER
----- Message from Lauri Santos MD sent at 10/30/2023  9:49 PM CDT -----  Call patient.  Urine culture did not grow out any significant germs.  No significant UTI found.

## 2023-11-03 LAB — BACTERIA UR CULT: NORMAL

## 2023-11-16 RX ORDER — PROMETHAZINE HYDROCHLORIDE AND PHENYLEPHRINE HYDROCHLORIDE 6.25; 5 MG/5ML; MG/5ML
5 SYRUP ORAL EVERY 6 HOURS PRN
Qty: 120 ML | Refills: 0 | Status: SHIPPED | OUTPATIENT
Start: 2023-11-16 | End: 2023-11-17

## 2023-11-16 RX ORDER — AZITHROMYCIN 250 MG/1
TABLET, FILM COATED ORAL
Qty: 6 TABLET | Refills: 0 | Status: SHIPPED | OUTPATIENT
Start: 2023-11-16 | End: 2023-11-21

## 2023-11-16 NOTE — TELEPHONE ENCOUNTER
----- Message from Antonia Solano sent at 11/16/2023 12:47 PM CST -----  Pt has had a dry cough and hoarse. He got cough syrup over the counter and it is not working. When some of it breaks lose it is green. At night he runs fever of 99. He takes a tylenol and it goes away. Would like some stronger cough syrup called in   [facility]   [phone]

## 2023-11-16 NOTE — TELEPHONE ENCOUNTER
"Spoke to pt and let him know per Dr.Casey AMADOR sage and promethazine VC 1 tsp QID 4 oz"  Pt verbalized understanding   "

## 2023-11-17 RX ORDER — PROMETHAZINE HYDROCHLORIDE AND DEXTROMETHORPHAN HYDROBROMIDE 6.25; 15 MG/5ML; MG/5ML
5 SYRUP ORAL EVERY 4 HOURS PRN
Qty: 240 ML | Refills: 0 | Status: SHIPPED | OUTPATIENT
Start: 2023-11-17 | End: 2023-11-27

## 2023-11-17 NOTE — TELEPHONE ENCOUNTER
----- Message from Krystle De La Rosa MA sent at 11/17/2023 10:38 AM CST -----    ----- Message -----  From: Fatmata Tapia  Sent: 11/17/2023  10:38 AM CST  To: Lauri DOTY

## 2023-11-17 NOTE — TELEPHONE ENCOUNTER
Spoke with pt daughter to see if there was another pharmacy she would like the medication sent to. She denied and was wondering if Ceritussin or promethazine-dm could be called into [facility]'s pharmacy.     Per [facility] for promethazine-DM. Rx order set up. Please add dx code.     Pt daughter informed. She voiced understanding.

## 2023-11-21 DIAGNOSIS — K58.1 IRRITABLE BOWEL SYNDROME WITH CONSTIPATION: ICD-10-CM

## 2023-11-21 NOTE — TELEPHONE ENCOUNTER
----- Message from Julita Barrera sent at 11/21/2023  9:33 AM CST -----  Refill linzess [facility]. Can this be called in by 12 today. He has someone to go get the prescription. pt's # [phone]

## 2023-11-21 NOTE — TELEPHONE ENCOUNTER
----- Message from Cyndi Cervantes sent at 11/21/2023  9:48 AM CST -----  Refill for Linzess. [facility] in [place]. Pt #[phone]

## 2023-11-30 ENCOUNTER — HOSPITAL ENCOUNTER (EMERGENCY)
Facility: HOSPITAL | Age: 88
Discharge: HOME | End: 2023-11-30
Attending: EMERGENCY MEDICINE
Payer: MEDICARE

## 2023-11-30 VITALS
WEIGHT: 153 LBS | RESPIRATION RATE: 18 BRPM | SYSTOLIC BLOOD PRESSURE: 158 MMHG | TEMPERATURE: 98 F | DIASTOLIC BLOOD PRESSURE: 89 MMHG | BODY MASS INDEX: 25.49 KG/M2 | OXYGEN SATURATION: 97 % | HEIGHT: 65 IN | HEART RATE: 97 BPM

## 2023-11-30 DIAGNOSIS — K59.00 CONSTIPATION, UNSPECIFIED CONSTIPATION TYPE: Primary | ICD-10-CM

## 2023-11-30 LAB
BILIRUB UR QL STRIP: NEGATIVE
CLARITY UR: CLEAR
COLOR UR: YELLOW
GLUCOSE UR QL STRIP: NEGATIVE
HGB UR QL STRIP: NEGATIVE
KETONES UR QL STRIP: NEGATIVE
LEUKOCYTE ESTERASE UR QL STRIP: NEGATIVE
NITRITE UR QL STRIP: NEGATIVE
PH UR STRIP: 6 [PH] (ref 5–8)
PROT UR QL STRIP: NEGATIVE
SP GR UR STRIP: 1.01 (ref 1–1.03)
URN SPEC COLLECT METH UR: NORMAL
UROBILINOGEN UR STRIP-ACNC: NEGATIVE EU/DL

## 2023-11-30 PROCEDURE — 99283 EMERGENCY DEPT VISIT LOW MDM: CPT

## 2023-11-30 PROCEDURE — 81003 URINALYSIS AUTO W/O SCOPE: CPT | Performed by: NURSE PRACTITIONER

## 2023-11-30 NOTE — ED PROVIDER NOTES
"Encounter Date: 11/30/2023       History     Chief Complaint   Patient presents with    Constipation     LAST NORM BM 2 DAYS AGO    Rectal Pain     89 year old male with hx of chronic constipation on Linzes presents to the ER with concern for constipation x 2 days. Reports last BM 2 days ago. Reports pain to rectum from hard stool ball. No improvement with current OTC remedies and rx meds. No abdoinal pain but reports some referred discomfort to bladder. No urinary complaints. Hx of prostate cancer. No n/v/d. No blood in stool.          Review of patient's allergies indicates:   Allergen Reactions    Atorvastatin Other (See Comments)     Other reaction(s): Muscle pain    Codeine      Other reaction(s): makes him"goofey"    Contrast media     Iodinated contrast media      Other reaction(s): Rash     Past Medical History:   Diagnosis Date    Cataract     CKD (chronic kidney disease) stage 3, GFR 30-59 ml/min 3/16/2014    Coronary artery disease     GERD (gastroesophageal reflux disease)     Irritable bowel syndrome with constipation 11/30/2016     Past Surgical History:   Procedure Laterality Date    APPENDECTOMY      COLONOSCOPY      CYSTOSCOPY      pterygium removal Left     UPPER GASTROINTESTINAL ENDOSCOPY       Family History   Problem Relation Age of Onset    Rheum arthritis Mother     Cancer Father     Rheum arthritis Father     Cancer Sister         bone    Cancer Brother      Social History     Tobacco Use    Smoking status: Never    Smokeless tobacco: Never   Substance Use Topics    Alcohol use: Yes     Alcohol/week: 2.0 standard drinks of alcohol     Types: 1 Glasses of wine, 1 Cans of beer per week    Drug use: No     Review of Systems   Constitutional:  Negative for chills, diaphoresis, fatigue and fever.   HENT:  Negative for congestion and sore throat.    Respiratory:  Negative for shortness of breath.    Cardiovascular:  Negative for chest pain.   Gastrointestinal:  Positive for constipation and rectal " pain. Negative for abdominal distention, abdominal pain, anal bleeding, blood in stool, diarrhea, nausea and vomiting.   Genitourinary:  Negative for decreased urine volume, difficulty urinating, dysuria, flank pain, frequency, hematuria and urgency.   Musculoskeletal:  Negative for back pain.   Skin:  Negative for rash.   Neurological:  Negative for weakness.   Hematological:  Does not bruise/bleed easily.   All other systems reviewed and are negative.      Physical Exam     Initial Vitals [11/30/23 1434]   BP Pulse Resp Temp SpO2   (!) 158/89 97 18 98.2 °F (36.8 °C) 97 %      MAP       --         Physical Exam    Nursing note and vitals reviewed.  Constitutional: He appears well-developed and well-nourished. He is not diaphoretic.   HENT:   Head: Normocephalic and atraumatic.   Right Ear: External ear normal.   Left Ear: External ear normal.   Nose: Nose normal.   Mouth/Throat: Oropharynx is clear and moist.   Eyes: Conjunctivae are normal. Pupils are equal, round, and reactive to light.   Neck: Neck supple.   Normal range of motion.  Cardiovascular:  Normal rate.           Pulmonary/Chest: Breath sounds normal. He has no wheezes. He has no rhonchi.   Abdominal: Abdomen is soft. There is no abdominal tenderness.   Musculoskeletal:         General: No edema. Normal range of motion.      Cervical back: Normal range of motion and neck supple.     Neurological: He is alert and oriented to person, place, and time.   Skin: Skin is warm and dry.   Psychiatric: He has a normal mood and affect. His behavior is normal. Judgment and thought content normal.         ED Course   Procedures  Labs Reviewed   URINALYSIS, REFLEX TO URINE CULTURE    Narrative:     Specimen Source->Urine          Imaging Results              X-Ray Abdomen AP 1 View (Final result)  Result time 11/30/23 15:20:27      Final result by Dexter Cristobal MD (11/30/23 15:20:27)                   Narrative:    Reason: Constipation.    FINDINGS:    AP  abdominal radiograph with comparison radiograph January 12, 2022. Multiple loops of gas and stool-filled bowel noted in unremarkable bowel gas pattern. Lung bases are clear. No acute osseous abnormality.    IMPRESSION:    Unremarkable abdominal radiograph.    Electronically signed by:  Dexter Cristobal   11/30/2023 03:20 PM CST Workstation: [id number]                                     Medications - No data to display  Medical Decision Making  Pt here for constipation. XR of abdomen unremarkable without evidence of bowel obstruction. UA is normal. While in the ER patient was able to use the restroom and have a successful BM spontaneously. He reports feeling much better. He will go home and continue to take his Linzess, add daily miralax along with daily stool softener and increase fluid intake. He understands to reduce the miralax if he begins to develop diarrhea sx. He will follow up with PCP and return to the ER for any new or worsening sx.     Amount and/or Complexity of Data Reviewed  Labs: ordered. Decision-making details documented in ED Course.  Radiology: ordered. Decision-making details documented in ED Course.                                      Clinical Impression:  Final diagnoses:  [K59.00] Constipation, unspecified constipation type (Primary)          ED Disposition Condition    Discharge Stable          ED Prescriptions    None       Follow-up Information       Follow up With Specialties Details Why Contact Info Additional Information    Lauri Santos MD Family Medicine, Home Health Services, Hospice Services Schedule an appointment as soon as possible for a visit in 3 days for ER visit follow up and re-evaluation [address] [facility]  [address]  [facility] [id number]  [phone]       [facility] - Emergency Dept Emergency Medicine Go to  As needed, If symptoms worsen [address] [facility] [address]  [phone] 1st floor             Nataly  Franchesca REEVES NP  11/30/23 1608

## 2023-11-30 NOTE — FIRST PROVIDER EVALUATION
"Medical screening examination initiated.  I have conducted a focused provider triage encounter, findings are as follows:    Brief history of present illness:  89 year old male with hx of chronic constipation on Linzes presents to the ER with concern for constipation x 2 days. Reports last BM 2 days ago. Reports pain to rectum from hard stool ball. No improvement with current OTC remedies and rx meds. No abdoinal pain but reports some referred discomfort to bladder. No urinary complaints. Hx of prostate cancer. No n/v/d. No blood in stool.    Vitals:    11/30/23 1434   BP: (!) 158/89   BP Location: Left arm   Patient Position: Sitting   Pulse: 97   Resp: 18   Temp: 98.2 °F (36.8 °C)   TempSrc: Oral   SpO2: 97%   Weight: 69.4 kg (153 lb)   Height: 5' 5" (1.651 m)       Pertinent physical exam:  Aaox3, abdomen non distended. Stable vitals.     Brief workup plan:  see orders placed     Preliminary workup initiated; this workup will be continued and followed by the physician or advanced practice provider that is assigned to the patient when roomed.  "

## 2024-01-02 ENCOUNTER — TELEPHONE (OUTPATIENT)
Dept: FAMILY MEDICINE | Facility: CLINIC | Age: 89
End: 2024-01-02
Payer: MEDICARE

## 2024-01-02 NOTE — TELEPHONE ENCOUNTER
----- Message from Cyndi Cervantes sent at 1/2/2024  8:52 AM CST -----  Pt thinks he might have the shingles again. Please advise. Pt #[phone]

## 2024-01-02 NOTE — TELEPHONE ENCOUNTER
[facility] Consultation / Initial Visit      Today's Date: 11/18/2022    PCP: Morelia Tejada CNP  Referring Provider: No ref. provider found    INDICATION FOR CONSULTATION: Syncope      HPI:       88 year old female presents with a chief complaint of weakness and episode of unresponsiveness.  Per EMS, patient was unresponsive for them on arrival.  The patient did become more alert during their initial intake.  They state that she \"may be\" hypoxic as they had a poor waveform and reading, they started her on nonrebreather however were not able to obtain saturations following this.  Patient is a poor historian, and does not recall what brought her here, she states that her  was \"worried,\" that she slumped at the breakfast table and her  called EMS.  Denies any chest pain or shortness of breath, nausea or vomiting.       The patient past medical history is notable for coronary artery disease with prior stent placement several years ago, chronic diastolic: heart failure with improved E,, LBBB, hypertension, obesity, obstructive sleep apnea history of dizzy spells and hearing loss      ROS:     General: No fever or chills, No loss of appetite.    RS: No hemoptysis  GI: No melena or hematochezia  : No urinary disturbance  Derm: No skin disorders   Heme: No blood dyscrasias.  Remainder of 12 point systems reviewed and negative (or as mentioned in HPI)    Relevant Past Medical History:  Past Medical History:   Diagnosis Date   • Benign hypertensive heart disease    • CAD (coronary artery disease)    • Cataract    • CKD (chronic kidney disease)    • HLD (hyperlipidemia)    • HTN (hypertension)    • LBBB (left bundle branch block)    • SNHL (sensorineural hearing loss)    • Syncope       Past Surgical History:   Procedure Laterality Date   • Cholecystectomy     • Coronary angioplasty with stent placement     • Hysterectomy     • Ligation of abd-venous shunt     • Ptca     • Shoulder surgery     •  Spoke with patient regarding recent call. Patient states he has singles, had twice before. Had both immunizations. Been going on a week, last time had shingles has been years ago.   Patient states he has red painful blisters on left leg around the knee and around waist band spreading to his back.   Unable to send picture due to no active MyChart.     Printed for provider review.    Total knee replacement          Social History:  Social History     Tobacco Use   Smoking Status Never Smoker   Smokeless Tobacco Never Used     Social History     Substance and Sexual Activity   Alcohol Use Not Currently     History   Drug Use Unknown       Family History:   Family History   Problem Relation Age of Onset   • Patient is unaware of any medical problems Mother    • Patient is unaware of any medical problems Father        Inpatient Medications  Current Facility-Administered Medications   Medication Dose Route Frequency Provider Last Rate Last Admin   • sodium chloride (PF) 0.9 % injection 2 mL  2 mL Intracatheter 2 times per day Thelma Uriostegui DO       • heparin (porcine) injection 5,000 Units  5,000 Units Subcutaneous 3 times per day Thelma Uriostegui DO          Current Facility-Administered Medications   Medication Dose Route Frequency Provider Last Rate Last Admin   • sodium chloride 0.9% infusion   Intravenous Continuous Thelma Uriostegui DO          Current Facility-Administered Medications   Medication Dose Route Frequency Provider Last Rate Last Admin   • sodium chloride 0.9 % flush bag 25 mL  25 mL Intravenous PRN Thelma Uriostegui DO            Allergy   ALLERGIES:  No Known Allergies         Examination:      Vital Last Value 24 Hour Range   Temperature 97.5 °F (36.4 °C) (11/18/22 1136) Temp  Min: 97.5 °F (36.4 °C)  Max: 97.5 °F (36.4 °C)   Pulse (!) 58 (11/18/22 1505) Pulse  Min: 56  Max: 82   Respiratory 16 (11/18/22 1505) Resp  Min: 12  Max: 16   Non-Invasive  Blood Pressure 114/52 (11/18/22 1505) BP  Min: 98/46  Max: 114/52   Pulse Oximetry 98 % (11/18/22 1505) SpO2  Min: 95 %  Max: 100 %   Arterial   Blood Pressure   No data recorded         Intake/Output Summary (Last 24 hours) at 11/18/2022 1730  Last data filed at 11/18/2022 1436  Gross per 24 hour   Intake 1000 ml   Output --   Net 1000 ml        There were no vitals filed for this visit.      GENERAL: No apparent distress  HEENT:  Normocephalic.  Neck:  Supple neck.   Oral mucosa : Pink and moist.    Endocrine: There is no goiter.  CVS: Regular rate and rhythm.  Normal first and second heart tones.   Lung fields: Clear to auscultation bilaterally.   GI: Soft. Nontender, nondistended.    Lower extremity: No cyanosis, clubbing or edema.   Peripheral vascular: Both lower extremities are warm and well perfused.    Neuro: Awake and alert.  Nonfocal examination.  Psych: Appropriate mood and affect  Integumentary: Warm and Dry      Clinical Data:       The following were personally visualized and interpreted by me:    LABS:    CBC  Recent Labs   Lab 11/18/22  1136   WBC 7.4   HCT 39.6   HGB 13.1          CMP  Recent Labs   Lab 11/18/22  1136   SODIUM 143   POTASSIUM 3.4   CHLORIDE 110   CO2 25   GLUCOSE 148*   BUN 22*   CREATININE 1.36*   CALCIUM 9.5   TOTPROTEIN 6.6   ALBUMIN 3.1*   BILIRUBIN 0.4   AST 16   GPT 16   ALKPT 78       Cardiac Labs  Recent Labs   Lab 11/18/22  1136   HTROPI 16   NTPROB 532*       Lipid Panel  No results found    Coags  Recent Labs   Lab 11/18/22  1136   INR 1.0   PTT 23       ABG  No results found    IMAGING:    ECG:   Encounter Date: 11/18/22   Electrocardiogram 12-Lead   Result Value    Ventricular Rate EKG/Min (BPM) 59    Atrial Rate (BPM) 59    WY-Interval (MSEC) 206    QRS-Interval (MSEC) 102    QT-Interval (MSEC) 452    QTc 448    P Axis (Degrees) -3    R Axis (Degrees) -5    T Axis (Degrees) -16    REPORT TEXT      Sinus bradycardia  Voltage criteria for left ventricular hypertrophy  Nonspecific T wave abnormality  Abnormal ECG  When compared with ECG of  18-NOV-2022 11:25,  Nonspecific T wave abnormality has replaced inverted T waves in  Anterior leads          Echocardiogram:  Results for orders placed during the hospital encounter of 01/19/21    TRANSTHORACIC ECHO (TTE) COMPLETE W/ W/O IMAGING AGENT    Narrative  *[facility]*  [address] [address] [phone]  [phone]  Transthoracic Echocardiogram (TTE)    Patient: Zaida Shaw    Study Date/Time:    2021 11:03AM  MRN:     [id number]               FIN#:               [id number]  :     10/23/1934            Ht/Wt:              149.9cm 111.1kg  Age:     86                    BSA/BMI:            2.01m^2 49.5kg/m^2  Gender:  F                     Baseline BP:    Referring Physician:     Edgardo Hennessy    Attending Physician:     Edgardo Hennessy    Diagnostic Physician:    Bessie Fuller MD  Sonographer:             Earlene Darden RDCS    --------------------------------------------------------------------------  INDICATIONS:   Syncope.    --------------------------------------------------------------------------  STUDY CONCLUSIONS  SUMMARY:   Left ventricle: The cavity size is normal. Wall thickness is  normal. The ejection fraction was measured by visual estimation. Doppler  parameters are consistent with abnormal left ventricular relaxation (grade  1 diastolic dysfunction). The ejection fraction is 40%.    --------------------------------------------------------------------------  STUDY DATA:  There is no prior study available for comparison at this  time.  Procedure:  Transthoracic echocardiography was performed. Image  quality was suboptimal. The study was technically limited due to poor  acoustic window availability and body habitus. Intravenous contrast  (Definity) was administered to opacify the left ventricle.  M-mode,  complete 2D, complete spectral Doppler, and color Doppler.  Study status:  Routine.  Study completion:  There were no complications.    FINDINGS    LEFT VENTRICLE:  The cavity size is normal. Wall thickness is normal.  Systolic function is mildly reduced.  Moderate global hypokinesis.  Regional wall motion abnormalities cannot be excluded.    The ejection  fraction was measured by visual estimation. The ejection fraction is 40%.  The tissue Doppler parameters are abnormal. Doppler  parameters are  consistent with abnormal left ventricular relaxation (grade 1 diastolic  dysfunction).    AORTIC VALVE:  The annulus is normal-sized. The valve is trileaflet. The  leaflets are normal thickness.  Doppler:  Transvalvular velocity is within  the normal range. There is no stenosis.  No regurgitation.    AORTA:  Aortic root: The aortic root is normal in size.  Ascending aorta: The ascending aorta is normal in size.    MITRAL VALVE:  The annulus is normal-sized. The leaflets are normal  thickness.  Doppler:  Transvalvular velocity is within the normal range.  There is no evidence for stenosis.  No regurgitation.    LEFT ATRIUM:  The atrium is normal in size.    RIGHT VENTRICLE:  The cavity size is normal. Systolic function is reduced.    PULMONIC VALVE:   The annulus is normal-sized. The leaflets are normal  thickness.  Doppler:  Transvalvular velocity is within the normal range.  There is no evidence for stenosis.  Trivial regurgitation.    TRICUSPID VALVE:  The annulus is normal-sized. The leaflets are normal  thickness.  Doppler:  Transvalvular velocity is within the normal range.  There is no evidence for stenosis.  No regurgitation.    RIGHT ATRIUM:  The atrium is normal in size.    PERICARDIUM:  There is no pericardial effusion.    SYSTEMIC VEINS:  Inferior vena cava: The vessel is normal in size. The respirophasic  diameter changes are in the normal range (greater than or equal to 50%).    BASELINE ECG:   Sinus with AV Block.           Cath Report:  No results found for this or any previous visit.      Assessment/Plans:     Episode of unresponsivness, syncope  Sinus bradycardia with left bundle branch block  Acute renal insufficiency  Weakness  -Ct head showed no acute intracranial process  -troponin negative  -EKG reviewed, shows sinus bradycardia with LBBB  -repeat echo pending  -Blood pressure is borderline low and serum creatinine relatively elevated  -discontinue BB, avoid AV vandana blocking  agents  -Hold lisinopril  -Gentle hydration  -Monitor patient on telemetry and if unremarkable consider 30 day event monitor on discharge  -TSH pending    Chronic HFrEF  -NTpBNP 532 on admission  -No evidence of fluid overload  -Last echocardiogram from 2021 showed EF 40% with grade 1 diastolic dysfunction, no major valvulopathy  -repeat echo pending  -lisinopril: hold due to borderline low BP  -toprol discontinued due to bradycardia and soft low blood pressure    CAD status post remote stent  -troponin negative x1  - stress echo showed no diagnostic evidence for stress-induced ischemia  -on ASA, BB and statin    Labile hypertension  -borderline low BP noted  -beta blocker discontinued  -ACE inhibitor on hold    Acute renal impairment on chronic kidney disease  -gentle IV hydration  -Bmp in AM    Hyperlipidemia  -on statin    Obstructive sleep apnea  -Had sleep test in  showing moderate obstructive sleep apnea    Obesity  -BMI >37    Plan  Patient's syncope is most probably secondary to volume depletion and may have been contributed by her bradycardia arrhythmia.  Patient will be placed on telemetry for evaluation of any pauses or high degree AV block.  Beta-blocker discontinued  ACE inhibitor is on hold  Continue IV fluids and check labs in a.m.    Thank you for allowing us to participate in this patient's care.  Please do not hesitate to call with any questions or concerns.

## 2024-01-03 ENCOUNTER — OFFICE VISIT (OUTPATIENT)
Dept: FAMILY MEDICINE | Facility: CLINIC | Age: 89
End: 2024-01-03
Payer: MEDICARE

## 2024-01-03 VITALS
WEIGHT: 148 LBS | HEART RATE: 86 BPM | BODY MASS INDEX: 24.66 KG/M2 | SYSTOLIC BLOOD PRESSURE: 172 MMHG | DIASTOLIC BLOOD PRESSURE: 80 MMHG | TEMPERATURE: 98 F | HEIGHT: 65 IN

## 2024-01-03 DIAGNOSIS — B02.9 HERPES ZOSTER WITHOUT COMPLICATION: Primary | ICD-10-CM

## 2024-01-03 PROCEDURE — 1101F PT FALLS ASSESS-DOCD LE1/YR: CPT | Mod: CPTII,S$GLB,,

## 2024-01-03 PROCEDURE — 3288F FALL RISK ASSESSMENT DOCD: CPT | Mod: CPTII,S$GLB,,

## 2024-01-03 PROCEDURE — 1159F MED LIST DOCD IN RCRD: CPT | Mod: CPTII,S$GLB,,

## 2024-01-03 PROCEDURE — 99213 OFFICE O/P EST LOW 20 MIN: CPT | Mod: S$GLB,,,

## 2024-01-03 RX ORDER — LIDOCAINE 50 MG/G
OINTMENT TOPICAL
Qty: 30 G | Refills: 0 | Status: SHIPPED | OUTPATIENT
Start: 2024-01-03

## 2024-01-03 RX ORDER — FAMCICLOVIR 500 MG/1
500 TABLET ORAL 3 TIMES DAILY
Qty: 21 TABLET | Refills: 0 | Status: SHIPPED | OUTPATIENT
Start: 2024-01-03 | End: 2024-01-10

## 2024-01-03 NOTE — PROGRESS NOTES
SUBJECTIVE:    Patient ID: Melvin Powers is a 89 y.o. male.    Chief Complaint: possible shingles to L knee / waistline / back x1 week (-unable to touch, sensitive)    89-year-old established male patient presents to clinic today with complaints of blisters on the inside of his left leg near his knee.  He states these have been there for a few days.  He states that he started feeling the burning and tingling on his skin about a week ago and he recognized that this may be the start of shingles as he has had it in the past.  He states when blisters popped up he knew that it was shingles.  He also has complaints of some sensitivity of the skin on his right flank but there are no blisters or rash evident today.  Denies fever and chills.        Admission on 11/30/2023, Discharged on 11/30/2023   Component Date Value Ref Range Status    Specimen UA 11/30/2023 Urine, Clean Catch   Final    Color, UA 11/30/2023 Yellow  Yellow, Straw, Emily Final    Appearance, UA 11/30/2023 Clear  Clear Final    pH, UA 11/30/2023 6.0  5.0 - 8.0 Final    Specific [place], UA 11/30/2023 1.015  1.005 - 1.030 Final    Protein, UA 11/30/2023 Negative  Negative Final    Glucose, UA 11/30/2023 Negative  Negative Final    Ketones, UA 11/30/2023 Negative  Negative Final    Bilirubin (UA) 11/30/2023 Negative  Negative Final    Occult Blood UA 11/30/2023 Negative  Negative Final    Nitrite, UA 11/30/2023 Negative  Negative Final    Urobilinogen, UA 11/30/2023 Negative  Negative EU/dL Final    Leukocytes, UA 11/30/2023 Negative  Negative Final   Orders Only on 11/03/2023   Component Date Value Ref Range Status    Urine Culture, Routine 11/03/2023    Final   Orders Only on 10/27/2023   Component Date Value Ref Range Status    Urine Culture, Routine 10/26/2023    Final   Office Visit on 10/24/2023   Component Date Value Ref Range Status    POC Blood, Urine 10/24/2023 Negative  Negative Final    POC Bilirubin, Urine 10/24/2023 Negative  Negative  "Final    POC Urobilinogen, Urine 10/24/2023 0.2 (A)  0.3 - 2.2 Final    POC Ketones, Urine 10/24/2023 Negative  Negative Final    POC Protein, Urine 10/24/2023 Negative  Negative Final    POC Nitrates, Urine 10/24/2023 Negative  Negative Final    POC Glucose, Urine 10/24/2023 Negative  Negative Final    pH, UA 10/24/2023 6.0   Final    POC Specific Gravity, Urine 10/24/2023 1.025  1.003 - 1.029 Final    POC Leukocytes, Urine 10/24/2023 Negative  Negative Final       Past Medical History:   Diagnosis Date    Cataract     CKD (chronic kidney disease) stage 3, GFR 30-59 ml/min 3/16/2014    Coronary artery disease     GERD (gastroesophageal reflux disease)     Irritable bowel syndrome with constipation 11/30/2016     Social History     Socioeconomic History    Marital status:    Tobacco Use    Smoking status: Never    Smokeless tobacco: Never   Substance and Sexual Activity    Alcohol use: Yes     Alcohol/week: 2.0 standard drinks of alcohol     Types: 1 Glasses of wine, 1 Cans of beer per week    Drug use: No     Past Surgical History:   Procedure Laterality Date    APPENDECTOMY      COLONOSCOPY      CYSTOSCOPY      pterygium removal Left     UPPER GASTROINTESTINAL ENDOSCOPY       Family History   Problem Relation Age of Onset    Rheum arthritis Mother     Cancer Father     Rheum arthritis Father     Cancer Sister         bone    Cancer Brother        Review of patient's allergies indicates:   Allergen Reactions    Atorvastatin Other (See Comments)     Other reaction(s): Muscle pain    Codeine      Other reaction(s): makes him"goofey"    Contrast media     Iodinated contrast media      Other reaction(s): Rash       Current Outpatient Medications:     cholecalciferol, vitamin D3, (VITAMIN D3) 25 mcg (1,000 unit) capsule, Take 1,000 Units by mouth once daily., Disp: , Rfl:     clopidogreL (PLAVIX) 75 mg tablet, Take 1 tablet (75 mg total) by mouth once daily., Disp: 90 tablet, Rfl: 3    clotrimazole-betamethasone " (LOTRISONE) lotion, Apply topically 2 (two) times daily., Disp: 180 mL, Rfl: 1    coenzyme Q10 100 mg capsule, Take 200 mg by mouth once daily., Disp: , Rfl:     esomeprazole (NEXIUM) 40 MG capsule, Nexium 40 mg capsule,delayed release  Take 1 capsule every day by oral route as needed., Disp: 90 capsule, Rfl: 3    furosemide (LASIX) 20 MG tablet, Take 1 tablet (20 mg total) by mouth daily as needed (swelling)., Disp: 30 tablet, Rfl: 2    latanoprost 0.005 % ophthalmic solution, Place 1 drop into both eyes every evening., Disp: , Rfl:     levothyroxine (SYNTHROID) 75 MCG tablet, Take 1 tablet (75 mcg total) by mouth once daily., Disp: 90 tablet, Rfl: 3    linaCLOtide (LINZESS) 72 mcg Cap capsule, Take 1 capsule (72 mcg total) by mouth before breakfast., Disp: 30 capsule, Rfl: 5    magnesium gluconate 27.5 mg (500 mg) Tab, Take 500 mg by mouth once daily., Disp: , Rfl:     meclizine (ANTIVERT) 25 mg tablet, Take 1 tablet (25 mg total) by mouth 3 (three) times daily as needed., Disp: 60 tablet, Rfl: 0    multivitamin (THERAGRAN) per tablet, Take 1 tablet by mouth once daily., Disp: 100 tablet, Rfl: 3    nitroGLYCERIN 0.1 mg/hr TD PT24 (NITRODUR) 0.1 mg/hr PT24, Place 1 patch onto the skin once daily., Disp: , Rfl: 11    potassium chloride SA (K-DUR,KLOR-CON M) 10 MEQ tablet, Take 1 tablet (10 mEq total) by mouth daily as needed (take one tablet any time you take furosemide)., Disp: 30 tablet, Rfl: 1    rosuvastatin (CRESTOR) 40 MG Tab, Take 1 tablet (40 mg total) by mouth once daily., Disp: 90 tablet, Rfl: 3    UNABLE TO FIND, Take 1 tablet by mouth once daily. PREVAGEN, Disp: , Rfl:     vitamin E 400 UNIT capsule, Take 400 Units by mouth once daily., Disp: , Rfl:     donepeziL (ARICEPT) 10 MG tablet, Take 1 tablet (10 mg total) by mouth every evening., Disp: 30 tablet, Rfl: 5    famciclovir (FAMVIR) 500 MG tablet, Take 1 tablet (500 mg total) by mouth 3 (three) times daily. for 7 days, Disp: 21 tablet, Rfl: 0     "LIDOcaine (XYLOCAINE) 5 % Oint ointment, Apply topically as needed (to painful rash, 3 to 4 times a day)., Disp: 30 g, Rfl: 0    lisinopriL 10 MG tablet, Take 0.5 tablets (5 mg total) by mouth once daily., Disp: 45 tablet, Rfl: 1    Review of Systems   Constitutional:  Negative for chills and fever.   Integumentary:  Positive for rash.        Painful burning.  Photo in media.           Objective:      Vitals:    01/03/24 1020 01/03/24 1024   BP: (!) 166/78 (!) 172/80   Pulse: 86    Temp: 98.4 °F (36.9 °C)    Weight: 67.1 kg (148 lb)    Height: 5' 5" (1.651 m)      Physical Exam  Constitutional:       Appearance: Normal appearance. He is well-developed, well-groomed and normal weight.      Comments: Appears stated age.   HENT:      Head: Normocephalic and atraumatic.      Nose: No rhinorrhea.   Eyes:      General: No scleral icterus.  Cardiovascular:      Rate and Rhythm: Normal rate and regular rhythm.   Pulmonary:      Effort: Pulmonary effort is normal.      Breath sounds: Normal breath sounds.   Musculoskeletal:      Right lower leg: No edema.      Left lower leg: No edema.   Lymphadenopathy:      Cervical: No cervical adenopathy.   Skin:     Findings: Lesion and rash present.      Comments: Small patch of erythema with vesicular rash to medial left patellar region.  Photo loaded in media.  Painful to touch.   Neurological:      General: No focal deficit present.      Mental Status: He is alert.   Psychiatric:         Mood and Affect: Mood normal.         Behavior: Behavior is cooperative.           Assessment:       1. Herpes zoster without complication         Plan:       Herpes zoster without complication  Advised patient on how to take famciclovir.  She verbalized understanding.  Advised patient that he can use the lidocaine ointment in a small amount 3 to 4 times a day.  Patient does understand that shingles is contagious.  He understands to keep the site covered and to wash his hands if he touches it.  -    "  famciclovir (FAMVIR) 500 MG tablet; Take 1 tablet (500 mg total) by mouth 3 (three) times daily. for 7 days  Dispense: 21 tablet; Refill: 0  -     LIDOcaine (XYLOCAINE) 5 % Oint ointment; Apply topically as needed (to painful rash, 3 to 4 times a day).  Dispense: 30 g; Refill: 0      Follow up if symptoms worsen or fail to improve.        1/3/2024 Antonia Dunham

## 2024-01-03 NOTE — PATIENT INSTRUCTIONS
TAKE ONE PILL THREE TIMES A DAY, SO ONE IN THE MORNING, ONE AFTER LUNCHTIME AND ONE AT BEDTIME, FOR ONE WEEK.    YOU CAN USE THE LIDOCAINE OINTMENT TO THE PAINFUL, SENSITIVE AREAS THREE OR FOUR TIMES A DAY, ONLY A THIN LAYER, SMALL AMOUNT.

## 2024-04-11 ENCOUNTER — TELEPHONE (OUTPATIENT)
Dept: FAMILY MEDICINE | Facility: CLINIC | Age: 89
End: 2024-04-11
Payer: MEDICARE

## 2024-04-11 DIAGNOSIS — Z79.899 ENCOUNTER FOR LONG-TERM (CURRENT) USE OF OTHER MEDICATIONS: Primary | ICD-10-CM

## 2024-04-11 DIAGNOSIS — I10 ESSENTIAL HYPERTENSION: ICD-10-CM

## 2024-04-11 DIAGNOSIS — N18.30 STAGE 3 CHRONIC KIDNEY DISEASE, UNSPECIFIED WHETHER STAGE 3A OR 3B CKD: ICD-10-CM

## 2024-04-11 NOTE — TELEPHONE ENCOUNTER
Spoke to son, Melvin, that fasting lab and urine is due a week prior to visit, orders to [facility], gave fasting instructions.

## 2024-04-23 DIAGNOSIS — I25.10 CORONARY ARTERY DISEASE DUE TO CALCIFIED CORONARY LESION: ICD-10-CM

## 2024-04-23 DIAGNOSIS — I70.0 AORTIC ATHEROSCLEROSIS: ICD-10-CM

## 2024-04-23 DIAGNOSIS — I77.811 AORTIC ECTASIA, ABDOMINAL: ICD-10-CM

## 2024-04-23 DIAGNOSIS — I25.84 CORONARY ARTERY DISEASE DUE TO CALCIFIED CORONARY LESION: ICD-10-CM

## 2024-04-23 RX ORDER — CLOPIDOGREL BISULFATE 75 MG/1
75 TABLET ORAL DAILY
Qty: 90 TABLET | Refills: 3 | Status: SHIPPED | OUTPATIENT
Start: 2024-04-23

## 2024-04-23 RX ORDER — ROSUVASTATIN CALCIUM 40 MG/1
40 TABLET, COATED ORAL DAILY
Qty: 90 TABLET | Refills: 3 | Status: SHIPPED | OUTPATIENT
Start: 2024-04-23

## 2024-04-23 NOTE — TELEPHONE ENCOUNTER
----- Message from Eva Gil sent at 4/23/2024 10:09 AM CDT -----  Pt needs refill on cholesterol and Plavix.   [facility] [place]   [phone]

## 2024-04-25 ENCOUNTER — TELEPHONE (OUTPATIENT)
Dept: FAMILY MEDICINE | Facility: CLINIC | Age: 89
End: 2024-04-25
Payer: MEDICARE

## 2024-04-25 NOTE — TELEPHONE ENCOUNTER
----- Message from Aniyah Dee sent at 4/25/2024  3:53 PM CDT -----  VM:[id number]    Pt's son Kali called to speak about his parents appointment on Monday.    Kali: [phone]

## 2024-04-25 NOTE — TELEPHONE ENCOUNTER
Kali is asking for Dr. Santos to be aware that patient has been having a hard time taking care of spouse and if they may speak about possible medication for depression at upcoming visit.   Printed for provider for patient's upcoming appointment on Monday.

## 2024-04-29 ENCOUNTER — OFFICE VISIT (OUTPATIENT)
Dept: FAMILY MEDICINE | Facility: CLINIC | Age: 89
End: 2024-04-29
Attending: FAMILY MEDICINE
Payer: MEDICARE

## 2024-04-29 VITALS
HEIGHT: 65 IN | HEART RATE: 79 BPM | SYSTOLIC BLOOD PRESSURE: 132 MMHG | BODY MASS INDEX: 25.49 KG/M2 | DIASTOLIC BLOOD PRESSURE: 80 MMHG | WEIGHT: 153 LBS

## 2024-04-29 DIAGNOSIS — N40.0 BENIGN PROSTATIC HYPERPLASIA WITHOUT LOWER URINARY TRACT SYMPTOMS: ICD-10-CM

## 2024-04-29 DIAGNOSIS — F32.89 OTHER DEPRESSION: Primary | ICD-10-CM

## 2024-04-29 DIAGNOSIS — I25.84 CORONARY ARTERY DISEASE DUE TO CALCIFIED CORONARY LESION: ICD-10-CM

## 2024-04-29 DIAGNOSIS — I10 ESSENTIAL HYPERTENSION: ICD-10-CM

## 2024-04-29 DIAGNOSIS — Z85.46 HISTORY OF PROSTATE CANCER: ICD-10-CM

## 2024-04-29 DIAGNOSIS — K21.9 GASTROESOPHAGEAL REFLUX DISEASE WITHOUT ESOPHAGITIS: Chronic | ICD-10-CM

## 2024-04-29 DIAGNOSIS — N18.30 STAGE 3 CHRONIC KIDNEY DISEASE, UNSPECIFIED WHETHER STAGE 3A OR 3B CKD: Chronic | ICD-10-CM

## 2024-04-29 DIAGNOSIS — M54.16 LUMBAR RADICULOPATHY: ICD-10-CM

## 2024-04-29 DIAGNOSIS — I25.10 CORONARY ARTERY DISEASE DUE TO CALCIFIED CORONARY LESION: ICD-10-CM

## 2024-04-29 DIAGNOSIS — M51.36 DDD (DEGENERATIVE DISC DISEASE), LUMBAR: ICD-10-CM

## 2024-04-29 DIAGNOSIS — E78.00 HYPERCHOLESTEREMIA: Chronic | ICD-10-CM

## 2024-04-29 DIAGNOSIS — E03.9 ACQUIRED HYPOTHYROIDISM: ICD-10-CM

## 2024-04-29 DIAGNOSIS — I70.0 AORTIC ATHEROSCLEROSIS: ICD-10-CM

## 2024-04-29 DIAGNOSIS — R41.3 MEMORY LOSS: ICD-10-CM

## 2024-04-29 DIAGNOSIS — I25.10 ATHEROSCLEROSIS OF NATIVE CORONARY ARTERY OF NATIVE HEART WITHOUT ANGINA PECTORIS: ICD-10-CM

## 2024-04-29 PROCEDURE — 3288F FALL RISK ASSESSMENT DOCD: CPT | Mod: CPTII,S$GLB,, | Performed by: FAMILY MEDICINE

## 2024-04-29 PROCEDURE — 99214 OFFICE O/P EST MOD 30 MIN: CPT | Mod: S$GLB,,, | Performed by: FAMILY MEDICINE

## 2024-04-29 PROCEDURE — 1159F MED LIST DOCD IN RCRD: CPT | Mod: CPTII,S$GLB,, | Performed by: FAMILY MEDICINE

## 2024-04-29 PROCEDURE — 1101F PT FALLS ASSESS-DOCD LE1/YR: CPT | Mod: CPTII,S$GLB,, | Performed by: FAMILY MEDICINE

## 2024-04-29 RX ORDER — SERTRALINE HYDROCHLORIDE 25 MG/1
25 TABLET, FILM COATED ORAL DAILY
Qty: 30 TABLET | Refills: 3 | Status: SHIPPED | OUTPATIENT
Start: 2024-04-29 | End: 2025-04-29

## 2024-04-29 RX ORDER — DONEPEZIL HYDROCHLORIDE 10 MG/1
10 TABLET, FILM COATED ORAL NIGHTLY
Qty: 90 TABLET | Refills: 3 | Status: SHIPPED | OUTPATIENT
Start: 2024-04-29 | End: 2025-04-29

## 2024-04-29 RX ORDER — LISINOPRIL 10 MG/1
5 TABLET ORAL DAILY
Qty: 45 TABLET | Refills: 1 | Status: SHIPPED | OUTPATIENT
Start: 2024-04-29 | End: 2025-04-29

## 2024-04-30 NOTE — PROGRESS NOTES
"  SUBJECTIVE:    Patient ID: Melvin Powers is a 89 y.o. male.    Chief Complaint: Follow-up (No bottles, no complaints, abc )    89-year-old male here with his wife.  He eats 2 meals per day and drinks 1 boost supplement.  He complains of some constipation that is treated well with Linzess 72 mg daily.    He voids well and sees urologist Dr. Sargent for yearly PSA test.    On clopidogrel daily for coronary artery disease.  No chest pain or angina reported    GERD-occasionally takes a Nexium    He has some hearing loss and talks loudly.    Increased stress with the caregiver role for his wife with Alzheimer dementia.  "I worry too much" he is not opposed to trying a medication for anxiety and worry.    Right ankle has post fracture swelling, osteoarthritis of the fingers, right hand can not close in a good fist.    Follow-up  Associated symptoms include arthralgias. Pertinent negatives include no abdominal pain, chest pain, chills, coughing, fatigue, fever, joint swelling, myalgias, nausea, numbness or vomiting.       Admission on 11/30/2023, Discharged on 11/30/2023   Component Date Value Ref Range Status    Specimen UA 11/30/2023 Urine, Clean Catch   Final    Color, UA 11/30/2023 Yellow  Yellow, Straw, Emily Final    Appearance, UA 11/30/2023 Clear  Clear Final    pH, UA 11/30/2023 6.0  5.0 - 8.0 Final    Specific [place], UA 11/30/2023 1.015  1.005 - 1.030 Final    Protein, UA 11/30/2023 Negative  Negative Final    Glucose, UA 11/30/2023 Negative  Negative Final    Ketones, UA 11/30/2023 Negative  Negative Final    Bilirubin (UA) 11/30/2023 Negative  Negative Final    Occult Blood UA 11/30/2023 Negative  Negative Final    Nitrite, UA 11/30/2023 Negative  Negative Final    Urobilinogen, UA 11/30/2023 Negative  Negative EU/dL Final    Leukocytes, UA 11/30/2023 Negative  Negative Final   Orders Only on 11/03/2023   Component Date Value Ref Range Status    Urine Culture, Routine 11/03/2023    Final       Past " "Medical History:   Diagnosis Date    Cataract     CKD (chronic kidney disease) stage 3, GFR 30-59 ml/min 3/16/2014    Coronary artery disease     GERD (gastroesophageal reflux disease)     Irritable bowel syndrome with constipation 11/30/2016     Social History     Socioeconomic History    Marital status:    Tobacco Use    Smoking status: Never    Smokeless tobacco: Never   Substance and Sexual Activity    Alcohol use: Yes     Alcohol/week: 2.0 standard drinks of alcohol     Types: 1 Glasses of wine, 1 Cans of beer per week    Drug use: No     Past Surgical History:   Procedure Laterality Date    APPENDECTOMY      COLONOSCOPY      CYSTOSCOPY      pterygium removal Left     UPPER GASTROINTESTINAL ENDOSCOPY       Family History   Problem Relation Name Age of Onset    Rheum arthritis Mother      Cancer Father      Rheum arthritis Father      Cancer Sister          bone    Cancer Brother         The CVD Risk score (Adolphino, et al., 2008) failed to calculate for the following reasons:    The 2008 CVD risk score is only valid for ages 30 to 74    All of your core healthy metrics are met.      Review of patient's allergies indicates:   Allergen Reactions    Atorvastatin Other (See Comments)     Other reaction(s): Muscle pain    Codeine      Other reaction(s): makes him"goofey"    Contrast media     Iodinated contrast media      Other reaction(s): Rash       Current Outpatient Medications:     cholecalciferol, vitamin D3, (VITAMIN D3) 25 mcg (1,000 unit) capsule, Take 1,000 Units by mouth once daily., Disp: , Rfl:     clopidogreL (PLAVIX) 75 mg tablet, Take 1 tablet (75 mg total) by mouth once daily., Disp: 90 tablet, Rfl: 3    clotrimazole-betamethasone (LOTRISONE) lotion, Apply topically 2 (two) times daily., Disp: 180 mL, Rfl: 1    coenzyme Q10 100 mg capsule, Take 200 mg by mouth once daily., Disp: , Rfl:     esomeprazole (NEXIUM) 40 MG capsule, Nexium 40 mg capsule,delayed release  Take 1 capsule every day by " oral route as needed., Disp: 90 capsule, Rfl: 3    furosemide (LASIX) 20 MG tablet, Take 1 tablet (20 mg total) by mouth daily as needed (swelling)., Disp: 30 tablet, Rfl: 2    latanoprost 0.005 % ophthalmic solution, Place 1 drop into both eyes every evening., Disp: , Rfl:     levothyroxine (SYNTHROID) 75 MCG tablet, Take 1 tablet (75 mcg total) by mouth once daily., Disp: 90 tablet, Rfl: 3    LIDOcaine (XYLOCAINE) 5 % Oint ointment, Apply topically as needed (to painful rash, 3 to 4 times a day)., Disp: 30 g, Rfl: 0    linaCLOtide (LINZESS) 72 mcg Cap capsule, Take 1 capsule (72 mcg total) by mouth before breakfast., Disp: 30 capsule, Rfl: 5    magnesium gluconate 27.5 mg (500 mg) Tab, Take 500 mg by mouth once daily., Disp: , Rfl:     meclizine (ANTIVERT) 25 mg tablet, Take 1 tablet (25 mg total) by mouth 3 (three) times daily as needed., Disp: 60 tablet, Rfl: 0    multivitamin (THERAGRAN) per tablet, Take 1 tablet by mouth once daily., Disp: 100 tablet, Rfl: 3    nitroGLYCERIN 0.1 mg/hr TD PT24 (NITRODUR) 0.1 mg/hr PT24, Place 1 patch onto the skin once daily., Disp: , Rfl: 11    potassium chloride SA (K-DUR,KLOR-CON M) 10 MEQ tablet, Take 1 tablet (10 mEq total) by mouth daily as needed (take one tablet any time you take furosemide)., Disp: 30 tablet, Rfl: 1    rosuvastatin (CRESTOR) 40 MG Tab, Take 1 tablet (40 mg total) by mouth once daily., Disp: 90 tablet, Rfl: 3    UNABLE TO FIND, Take 1 tablet by mouth once daily. PREVAGEN, Disp: , Rfl:     vitamin E 400 UNIT capsule, Take 400 Units by mouth once daily., Disp: , Rfl:     donepeziL (ARICEPT) 10 MG tablet, Take 1 tablet (10 mg total) by mouth every evening., Disp: 90 tablet, Rfl: 3    lisinopriL 10 MG tablet, Take 0.5 tablets (5 mg total) by mouth once daily., Disp: 45 tablet, Rfl: 1    sertraline (ZOLOFT) 25 MG tablet, Take 1 tablet (25 mg total) by mouth once daily., Disp: 30 tablet, Rfl: 3    Review of Systems   Constitutional:  Negative for appetite  "change, chills, fatigue, fever and unexpected weight change.   HENT:  Negative for ear pain and trouble swallowing.    Eyes:  Negative for pain, discharge and visual disturbance.   Respiratory:  Negative for apnea, cough, shortness of breath and wheezing.    Cardiovascular:  Negative for chest pain and leg swelling.   Gastrointestinal:  Negative for abdominal pain, blood in stool, constipation, diarrhea, nausea, vomiting and reflux.   Endocrine: Negative for cold intolerance, heat intolerance and polydipsia.   Genitourinary:  Negative for bladder incontinence, dysuria, erectile dysfunction, frequency, hematuria, testicular pain and urgency.   Musculoskeletal:  Positive for arthralgias. Negative for gait problem, joint swelling and myalgias.   Neurological:  Negative for dizziness, seizures and numbness.   Psychiatric/Behavioral:  Positive for dysphoric mood. Negative for agitation, behavioral problems and hallucinations. The patient is nervous/anxious.            Objective:      Vitals:    04/29/24 1446   BP: 132/80   Pulse: 79   Weight: 69.4 kg (153 lb)   Height: 5' 5" (1.651 m)     Physical Exam  Vitals and nursing note reviewed.   Constitutional:       General: He is not in acute distress.     Appearance: Normal appearance. He is well-developed. He is not toxic-appearing.   HENT:      Head: Normocephalic and atraumatic.      Right Ear: Tympanic membrane and external ear normal.      Left Ear: Tympanic membrane and external ear normal.      Nose: Nose normal.      Mouth/Throat:      Pharynx: Oropharynx is clear.   Eyes:      Pupils: Pupils are equal, round, and reactive to light.   Neck:      Thyroid: No thyromegaly.      Vascular: No carotid bruit.   Cardiovascular:      Rate and Rhythm: Normal rate and regular rhythm.      Heart sounds: Normal heart sounds. No murmur heard.  Pulmonary:      Effort: Pulmonary effort is normal.      Breath sounds: Normal breath sounds. No wheezing or rales.   Abdominal:      " General: Bowel sounds are normal. There is no distension.      Palpations: Abdomen is soft.      Tenderness: There is no abdominal tenderness.   Musculoskeletal:         General: No tenderness or deformity. Normal range of motion.      Cervical back: Normal range of motion and neck supple.      Lumbar back: Normal. No spasms.      Comments: Bends 90 degrees at  waist, fingers have osteoarthritis, right hand unable to close in a good fist.  Left hand closes well, knees have good flexion and extension no pitting edema to lower extremities   Lymphadenopathy:      Cervical: No cervical adenopathy.   Skin:     General: Skin is warm and dry.      Findings: No rash.   Neurological:      Mental Status: He is alert and oriented to person, place, and time. Mental status is at baseline.      Cranial Nerves: No cranial nerve deficit.      Coordination: Coordination normal.   Psychiatric:         Behavior: Behavior normal.         Thought Content: Thought content normal.         Judgment: Judgment normal.      Comments: Patient is somewhat anxious, depressed mood           Assessment:       1. Other depression    2. Essential hypertension    3. Memory loss    4. Lumbar radiculopathy    5. DDD (degenerative disc disease), lumbar    6. Hypercholesteremia    7. Atherosclerosis of native coronary artery of native heart without angina pectoris    8. Coronary artery disease    9. Aortic atherosclerosis    10. Stage 3 chronic kidney disease, unspecified whether stage 3a or 3b CKD    11. Benign prostatic hyperplasia without lower urinary tract symptoms    12. Acquired hypothyroidism    13. History of prostate cancer    14. Gastroesophageal reflux disease without esophagitis         Plan:       Other depression  -     sertraline (ZOLOFT) 25 MG tablet; Take 1 tablet (25 mg total) by mouth once daily.  Dispense: 30 tablet; Refill: 3  Patient is somewhat despondent over his wife's Alzheimer diagnosis, will add low-dose sertraline 25 mg  daily and monitor his progress  Essential hypertension  -     lisinopriL 10 MG tablet; Take 0.5 tablets (5 mg total) by mouth once daily.  Dispense: 45 tablet; Refill: 1  Blood pressure well controlled  Memory loss  -     donepeziL (ARICEPT) 10 MG tablet; Take 1 tablet (10 mg total) by mouth every evening.  Dispense: 90 tablet; Refill: 3  Continue donepezil 10 mg daily to boost his memory  Lumbar radiculopathy    DDD (degenerative disc disease), lumbar    Hypercholesteremia  He needs to do lab work next week.  Atherosclerosis of native coronary artery of native heart without angina pectoris    Coronary artery disease  Stable with no angina  Aortic atherosclerosis    Stage 3 chronic kidney disease, unspecified whether stage 3a or 3b CKD  Labs due next week  Benign prostatic hyperplasia without lower urinary tract symptoms    Acquired hypothyroidism    History of prostate cancer  Urology Dr. Sargent following PSA  Gastroesophageal reflux disease without esophagitis  Continue occasional Nexium    No follow-ups on file.        4/29/2024 Lauri Santos

## 2024-05-10 ENCOUNTER — TELEPHONE (OUTPATIENT)
Dept: FAMILY MEDICINE | Facility: CLINIC | Age: 89
End: 2024-05-10
Payer: MEDICARE

## 2024-05-10 NOTE — TELEPHONE ENCOUNTER
----- Message from Lauri Santos MD sent at 5/10/2024 11:43 AM CDT -----  Call patient's family.  His lab work looks excellent.  Urinalysis shows no infection.  CBC shows no anemia.  Sugar kidneys liver and thyroid all normal.  Cholesterol excellent at 138.  Continue current medications.

## 2024-05-23 ENCOUNTER — TELEPHONE (OUTPATIENT)
Dept: FAMILY MEDICINE | Facility: CLINIC | Age: 89
End: 2024-05-23
Payer: MEDICARE

## 2024-05-23 DIAGNOSIS — R21 RASH AND NONSPECIFIC SKIN ERUPTION: Primary | ICD-10-CM

## 2024-05-23 RX ORDER — METHYLPREDNISOLONE 4 MG/1
TABLET ORAL
Qty: 21 EACH | Refills: 0 | Status: SHIPPED | OUTPATIENT
Start: 2024-05-23 | End: 2024-06-13

## 2024-05-23 NOTE — TELEPHONE ENCOUNTER
----- Message from Eva Gil sent at 5/23/2024 12:36 PM CDT -----  Pt states the doctor prescribed him z- pack before.  [phone]

## 2024-05-23 NOTE — TELEPHONE ENCOUNTER
"Per Nurse Monik Dunham, "I sent a medrol dose pack. Please let him know this might make him feel like he has consumed some caffeine, and might make his cheeks red. Also, he can take some Zyrtec 10mg over the counter for the itching."     Patient notified of Nurse Monik Dunham message and wrote everything down. No further action needed at this time.   "

## 2024-05-23 NOTE — TELEPHONE ENCOUNTER
----- Message from Julita Barrera sent at 5/23/2024 10:10 AM CDT -----  The patient said he itching all over. Dr. Santos gave him something before for this. Can he get the same thing called in.  [facility]  Pharmacy pt's # [phone]

## 2024-05-23 NOTE — TELEPHONE ENCOUNTER
Spoke with patient who states he doesn't know the name of the medication but you start with 2 pills and then 1 pill a day until it's finished. States he's seen margie for shingles and she gave him something for that and it went away but now he's itching and needs something sent.

## 2024-05-23 NOTE — TELEPHONE ENCOUNTER
"Per Nurse Practitioner Shields, "Z-pack is Zithromax, which is an antibiotic. Not for itching. What is causing him to itch? Has he tried Benadryl?"    Patient states he has tried benadryl and hydrocortisone for a week. Rash is all over patient's upper body and states he had this last year and Dr. Santos gave him a z-pack. Patient states he has not been working outside. The Pharmacist states he got a z-pack from Dr. Santos in September last year and this helped it the rash. Upon review of chart patient saw Dr. Santos and was given Cipro for dysuria around October last year. Reports he does not have shingles again and this is like a child's heat rash. Patient is asking for anything to help with rash that will be okay to take with his current medications.  "

## 2024-05-24 DIAGNOSIS — K58.1 IRRITABLE BOWEL SYNDROME WITH CONSTIPATION: ICD-10-CM

## 2024-05-24 NOTE — TELEPHONE ENCOUNTER
----- Message from Julita Barrera sent at 5/24/2024  8:39 AM CDT -----  Refill Linzess [facility] pt's # [phone]

## 2024-05-24 NOTE — TELEPHONE ENCOUNTER
----- Message from Jackelnie Craig MA sent at 5/24/2024  9:59 AM CDT -----  Pt daughter Gregoria is calling for refills on LINTRUECarS send to [facility] pharmacy    [phone] [facility] # because she works at [facility] and can not answer her cell

## 2024-07-15 DIAGNOSIS — E03.9 ACQUIRED HYPOTHYROIDISM: ICD-10-CM

## 2024-07-15 RX ORDER — LEVOTHYROXINE SODIUM 75 UG/1
75 TABLET ORAL DAILY
Qty: 90 TABLET | Refills: 3 | Status: SHIPPED | OUTPATIENT
Start: 2024-07-15

## 2024-07-15 NOTE — TELEPHONE ENCOUNTER
----- Message from Eva Gil sent at 7/15/2024  2:10 PM CDT -----  Contact: aisha  Aisha Daughter calling needs a refill on levothyroxine   [facility] pharmacy   [phone]

## 2024-08-15 ENCOUNTER — CLINICAL SUPPORT (OUTPATIENT)
Dept: FAMILY MEDICINE | Facility: CLINIC | Age: 89
End: 2024-08-15
Payer: MEDICARE

## 2024-08-15 VITALS — SYSTOLIC BLOOD PRESSURE: 120 MMHG | DIASTOLIC BLOOD PRESSURE: 59 MMHG | OXYGEN SATURATION: 98 % | HEART RATE: 80 BPM

## 2024-08-15 DIAGNOSIS — I10 ESSENTIAL HYPERTENSION: Primary | ICD-10-CM

## 2024-08-15 NOTE — PROGRESS NOTES
Pt is here for a nurse visit, B/p machine check. Pt is wanting us to check his b/p machine. He stated that he had bought a new machine, he just wanted to make sure that his new machine was calibrated. Blood pressure at the office was 122/52. Patients at home blood pressure machine 120/59

## 2024-08-27 DIAGNOSIS — K21.9 GASTROESOPHAGEAL REFLUX DISEASE WITHOUT ESOPHAGITIS: ICD-10-CM

## 2024-08-27 RX ORDER — ESOMEPRAZOLE MAGNESIUM 40 MG/1
CAPSULE, DELAYED RELEASE ORAL
Qty: 90 CAPSULE | Refills: 3 | Status: SHIPPED | OUTPATIENT
Start: 2024-08-27

## 2024-08-27 NOTE — TELEPHONE ENCOUNTER
----- Message from Antonia Solano sent at 8/27/2024  3:37 PM CDT -----  Pt daughter is calling to get a nexium 40 mg capsule. He has not had it in a while but feels like he needs it again   [facility]   Gregoria [phone]

## 2024-11-04 ENCOUNTER — TELEPHONE (OUTPATIENT)
Dept: FAMILY MEDICINE | Facility: CLINIC | Age: 89
End: 2024-11-04
Payer: MEDICARE

## 2024-11-04 DIAGNOSIS — Z79.899 ENCOUNTER FOR LONG-TERM (CURRENT) USE OF MEDICATIONS: ICD-10-CM

## 2024-11-04 DIAGNOSIS — N18.30 STAGE 3 CHRONIC KIDNEY DISEASE, UNSPECIFIED WHETHER STAGE 3A OR 3B CKD: ICD-10-CM

## 2024-11-04 DIAGNOSIS — E78.00 HYPERCHOLESTEREMIA: Chronic | ICD-10-CM

## 2024-11-04 DIAGNOSIS — I10 ESSENTIAL HYPERTENSION: Primary | ICD-10-CM

## 2024-11-04 NOTE — TELEPHONE ENCOUNTER
----- Message from Antonia sent at 11/4/2024  7:17 AM CST -----  Vm- 11/1-12:36 pm- pt has an appt on the 6th and would like to know if he needs labs done   [phone]

## 2024-11-06 ENCOUNTER — OFFICE VISIT (OUTPATIENT)
Dept: FAMILY MEDICINE | Facility: CLINIC | Age: 89
End: 2024-11-06
Payer: MEDICARE

## 2024-11-06 VITALS
SYSTOLIC BLOOD PRESSURE: 112 MMHG | DIASTOLIC BLOOD PRESSURE: 60 MMHG | WEIGHT: 144 LBS | OXYGEN SATURATION: 98 % | BODY MASS INDEX: 23.99 KG/M2 | HEART RATE: 74 BPM | HEIGHT: 65 IN

## 2024-11-06 DIAGNOSIS — N18.30 STAGE 3 CHRONIC KIDNEY DISEASE, UNSPECIFIED WHETHER STAGE 3A OR 3B CKD: Chronic | ICD-10-CM

## 2024-11-06 DIAGNOSIS — Z85.46 HISTORY OF PROSTATE CANCER: ICD-10-CM

## 2024-11-06 DIAGNOSIS — M54.16 LUMBAR RADICULOPATHY: ICD-10-CM

## 2024-11-06 DIAGNOSIS — I25.10 CORONARY ARTERY DISEASE DUE TO CALCIFIED CORONARY LESION: ICD-10-CM

## 2024-11-06 DIAGNOSIS — K58.1 IRRITABLE BOWEL SYNDROME WITH CONSTIPATION: Primary | ICD-10-CM

## 2024-11-06 DIAGNOSIS — E78.00 HYPERCHOLESTEREMIA: Chronic | ICD-10-CM

## 2024-11-06 DIAGNOSIS — E03.9 ACQUIRED HYPOTHYROIDISM: ICD-10-CM

## 2024-11-06 DIAGNOSIS — R26.89 IMBALANCE: ICD-10-CM

## 2024-11-06 DIAGNOSIS — I10 ESSENTIAL HYPERTENSION: ICD-10-CM

## 2024-11-06 DIAGNOSIS — M51.360 DEGENERATION OF INTERVERTEBRAL DISC OF LUMBAR REGION WITH DISCOGENIC BACK PAIN: ICD-10-CM

## 2024-11-06 DIAGNOSIS — R60.0 LOCALIZED EDEMA: ICD-10-CM

## 2024-11-06 DIAGNOSIS — G47.9 SLEEP DISORDER: Chronic | ICD-10-CM

## 2024-11-06 DIAGNOSIS — N40.0 BENIGN PROSTATIC HYPERPLASIA WITHOUT LOWER URINARY TRACT SYMPTOMS: ICD-10-CM

## 2024-11-06 DIAGNOSIS — K21.9 GASTROESOPHAGEAL REFLUX DISEASE WITHOUT ESOPHAGITIS: Chronic | ICD-10-CM

## 2024-11-06 DIAGNOSIS — I25.84 CORONARY ARTERY DISEASE DUE TO CALCIFIED CORONARY LESION: ICD-10-CM

## 2024-11-06 PROCEDURE — 1159F MED LIST DOCD IN RCRD: CPT | Mod: CPTII,S$GLB,, | Performed by: FAMILY MEDICINE

## 2024-11-06 PROCEDURE — 99214 OFFICE O/P EST MOD 30 MIN: CPT | Mod: S$GLB,,, | Performed by: FAMILY MEDICINE

## 2024-11-06 PROCEDURE — 3288F FALL RISK ASSESSMENT DOCD: CPT | Mod: CPTII,S$GLB,, | Performed by: FAMILY MEDICINE

## 2024-11-06 PROCEDURE — 1101F PT FALLS ASSESS-DOCD LE1/YR: CPT | Mod: CPTII,S$GLB,, | Performed by: FAMILY MEDICINE

## 2024-11-06 RX ORDER — POTASSIUM CHLORIDE 750 MG/1
10 TABLET, EXTENDED RELEASE ORAL DAILY PRN
Qty: 90 TABLET | Refills: 1 | Status: SHIPPED | OUTPATIENT
Start: 2024-11-06

## 2024-11-08 LAB
ALBUMIN SERPL-MCNC: 4.1 G/DL (ref 3.6–5.1)
ALBUMIN/GLOB SERPL: 1.7 (CALC) (ref 1–2.5)
ALP SERPL-CCNC: 86 U/L (ref 35–144)
ALT SERPL-CCNC: 25 U/L (ref 9–46)
AST SERPL-CCNC: 25 U/L (ref 10–35)
BILIRUB SERPL-MCNC: 0.8 MG/DL (ref 0.2–1.2)
BUN SERPL-MCNC: 24 MG/DL (ref 7–25)
BUN/CREAT SERPL: ABNORMAL (CALC) (ref 6–22)
CALCIUM SERPL-MCNC: 9.4 MG/DL (ref 8.6–10.3)
CHLORIDE SERPL-SCNC: 103 MMOL/L (ref 98–110)
CHOLEST SERPL-MCNC: 128 MG/DL
CHOLEST/HDLC SERPL: 2.5 (CALC)
CO2 SERPL-SCNC: 31 MMOL/L (ref 20–32)
CREAT SERPL-MCNC: 1.17 MG/DL (ref 0.7–1.22)
EGFR: 58 ML/MIN/1.73M2
GLOBULIN SER CALC-MCNC: 2.4 G/DL (CALC) (ref 1.9–3.7)
GLUCOSE SERPL-MCNC: 103 MG/DL (ref 65–99)
HDLC SERPL-MCNC: 51 MG/DL
LDLC SERPL CALC-MCNC: 60 MG/DL (CALC)
NONHDLC SERPL-MCNC: 77 MG/DL (CALC)
POTASSIUM SERPL-SCNC: 4.1 MMOL/L (ref 3.5–5.3)
PROT SERPL-MCNC: 6.5 G/DL (ref 6.1–8.1)
SODIUM SERPL-SCNC: 140 MMOL/L (ref 135–146)
TRIGL SERPL-MCNC: 86 MG/DL

## 2024-11-09 NOTE — PROGRESS NOTES
Call patient's daughter.  His lab work looks excellent with normal sugar kidneys and liver.  Cholesterol excellent at 128.  Continue current medications

## 2024-11-09 NOTE — PROGRESS NOTES
SUBJECTIVE:    Patient ID: Melvin Powers is a 89 y.o. male.    Chief Complaint: Follow-up (No bottles, loss balance, discuss about Rolator walker, declined flu vaccine, abc )    89-year-old male who cares for his wife who has Alzheimer dementia.  He complains of some pill dysphagia but seems to get down liquids and food okay.    Constipation, Linzess seems to help    Poor balance, he would like a Rollator or walker    Has disrupted sleep because of vigilance caring for his wife who wakes up at night    Follow-up  Pertinent negatives include no abdominal pain, chest pain, chills, coughing, fatigue, fever, joint swelling, myalgias, nausea, numbness or vomiting.       Telephone on 11/04/2024   Component Date Value Ref Range Status    Glucose 11/07/2024 103 (H)  65 - 99 mg/dL Final    BUN 11/07/2024 24  7 - 25 mg/dL Final    Creatinine 11/07/2024 1.17  0.70 - 1.22 mg/dL Final    eGFR 11/07/2024 58 (L)  > OR = 60 mL/min/1.73m2 Final    BUN/Creatinine Ratio 11/07/2024 SEE NOTE:  6 - 22 (calc) Final    Sodium 11/07/2024 140  135 - 146 mmol/L Final    Potassium 11/07/2024 4.1  3.5 - 5.3 mmol/L Final    Chloride 11/07/2024 103  98 - 110 mmol/L Final    CO2 11/07/2024 31  20 - 32 mmol/L Final    Calcium 11/07/2024 9.4  8.6 - 10.3 mg/dL Final    Total Protein 11/07/2024 6.5  6.1 - 8.1 g/dL Final    Albumin 11/07/2024 4.1  3.6 - 5.1 g/dL Final    Globulin, Total 11/07/2024 2.4  1.9 - 3.7 g/dL (calc) Final    Albumin/Globulin Ratio 11/07/2024 1.7  1.0 - 2.5 (calc) Final    Total Bilirubin 11/07/2024 0.8  0.2 - 1.2 mg/dL Final    Alkaline Phosphatase 11/07/2024 86  35 - 144 U/L Final    AST 11/07/2024 25  10 - 35 U/L Final    ALT 11/07/2024 25  9 - 46 U/L Final    Cholesterol 11/07/2024 128  <200 mg/dL Final    HDL 11/07/2024 51  > OR = 40 mg/dL Final    Triglycerides 11/07/2024 86  <150 mg/dL Final    LDL Cholesterol 11/07/2024 60  mg/dL (calc) Final    HDL/Cholesterol Ratio 11/07/2024 2.5  <5.0 (calc) Final    Non HDL  "Chol. (LDL+VLDL) 11/07/2024 77  <130 mg/dL (calc) Final       Past Medical History:   Diagnosis Date    Cataract     CKD (chronic kidney disease) stage 3, GFR 30-59 ml/min 3/16/2014    Coronary artery disease     GERD (gastroesophageal reflux disease)     Irritable bowel syndrome with constipation 11/30/2016     Social History     Socioeconomic History    Marital status:    Tobacco Use    Smoking status: Never    Smokeless tobacco: Never   Substance and Sexual Activity    Alcohol use: Yes     Alcohol/week: 2.0 standard drinks of alcohol     Types: 1 Glasses of wine, 1 Cans of beer per week    Drug use: No     Past Surgical History:   Procedure Laterality Date    APPENDECTOMY      COLONOSCOPY      CYSTOSCOPY      pterygium removal Left     UPPER GASTROINTESTINAL ENDOSCOPY       Family History   Problem Relation Name Age of Onset    Rheum arthritis Mother      Cancer Father      Rheum arthritis Father      Cancer Sister          bone    Cancer Brother         The CVD Risk score (Michele et al., 2008) failed to calculate for the following reasons:    The 2008 CVD risk score is only valid for ages 30 to 74    All of your core healthy metrics are met.      Review of patient's allergies indicates:   Allergen Reactions    Atorvastatin Other (See Comments)     Other reaction(s): Muscle pain    Codeine      Other reaction(s): makes him"goofey"    Contrast media     Iodinated contrast media      Other reaction(s): Rash       Current Outpatient Medications:     cholecalciferol, vitamin D3, (VITAMIN D3) 25 mcg (1,000 unit) capsule, Take 1,000 Units by mouth once daily., Disp: , Rfl:     clopidogreL (PLAVIX) 75 mg tablet, Take 1 tablet (75 mg total) by mouth once daily., Disp: 90 tablet, Rfl: 3    clotrimazole-betamethasone (LOTRISONE) lotion, Apply topically 2 (two) times daily., Disp: 180 mL, Rfl: 1    coenzyme Q10 100 mg capsule, Take 200 mg by mouth once daily., Disp: , Rfl:     donepeziL (ARICEPT) 10 MG tablet, " Take 1 tablet (10 mg total) by mouth every evening., Disp: 90 tablet, Rfl: 3    esomeprazole (NEXIUM) 40 MG capsule, Nexium 40 mg capsule,delayed release  Take 1 capsule every day by oral route as needed., Disp: 90 capsule, Rfl: 3    furosemide (LASIX) 20 MG tablet, Take 1 tablet (20 mg total) by mouth daily as needed (swelling)., Disp: 30 tablet, Rfl: 2    latanoprost 0.005 % ophthalmic solution, Place 1 drop into both eyes every evening., Disp: , Rfl:     levothyroxine (SYNTHROID) 75 MCG tablet, Take 1 tablet (75 mcg total) by mouth once daily., Disp: 90 tablet, Rfl: 3    LIDOcaine (XYLOCAINE) 5 % Oint ointment, Apply topically as needed (to painful rash, 3 to 4 times a day)., Disp: 30 g, Rfl: 0    lisinopriL 10 MG tablet, Take 0.5 tablets (5 mg total) by mouth once daily., Disp: 45 tablet, Rfl: 1    magnesium gluconate 27.5 mg (500 mg) Tab, Take 500 mg by mouth once daily., Disp: , Rfl:     meclizine (ANTIVERT) 25 mg tablet, Take 1 tablet (25 mg total) by mouth 3 (three) times daily as needed., Disp: 60 tablet, Rfl: 0    multivitamin (THERAGRAN) per tablet, Take 1 tablet by mouth once daily., Disp: 100 tablet, Rfl: 3    nitroGLYCERIN 0.1 mg/hr TD PT24 (NITRODUR) 0.1 mg/hr PT24, Place 1 patch onto the skin once daily., Disp: , Rfl: 11    rosuvastatin (CRESTOR) 40 MG Tab, Take 1 tablet (40 mg total) by mouth once daily., Disp: 90 tablet, Rfl: 3    sertraline (ZOLOFT) 25 MG tablet, Take 1 tablet (25 mg total) by mouth once daily., Disp: 30 tablet, Rfl: 3    UNABLE TO FIND, Take 1 tablet by mouth once daily. PREVAGEN, Disp: , Rfl:     vitamin E 400 UNIT capsule, Take 400 Units by mouth once daily., Disp: , Rfl:     linaCLOtide (LINZESS) 72 mcg Cap capsule, Take 1 capsule (72 mcg total) by mouth before breakfast., Disp: 30 capsule, Rfl: 5    potassium chloride SA (K-DUR,KLOR-CON M) 10 MEQ tablet, Take 1 tablet (10 mEq total) by mouth daily as needed (take one tablet any time you take furosemide)., Disp: 90 tablet,  "Rfl: 1    Review of Systems   Constitutional:  Negative for appetite change, chills, fatigue, fever and unexpected weight change.   HENT:  Negative for ear pain and trouble swallowing.    Eyes:  Negative for pain, discharge and visual disturbance.   Respiratory:  Negative for apnea, cough, shortness of breath and wheezing.    Cardiovascular:  Negative for chest pain and leg swelling.   Gastrointestinal:  Negative for abdominal pain, blood in stool, constipation, diarrhea, nausea, vomiting and reflux.        Dysphagia with pills   Endocrine: Negative for cold intolerance, heat intolerance and polydipsia.   Genitourinary:  Negative for bladder incontinence, dysuria, erectile dysfunction, frequency, hematuria, testicular pain and urgency.   Musculoskeletal:  Positive for gait problem. Negative for joint swelling and myalgias.   Neurological:  Negative for dizziness, seizures and numbness.   Psychiatric/Behavioral:  Negative for agitation, behavioral problems and hallucinations. The patient is not nervous/anxious.            Objective:      Vitals:    11/06/24 1458   BP: 112/60   Pulse: 74   SpO2: 98%   Weight: 65.3 kg (144 lb)   Height: 5' 5" (1.651 m)     Physical Exam  Vitals and nursing note reviewed.   Constitutional:       General: He is not in acute distress.     Appearance: Normal appearance. He is well-developed. He is not toxic-appearing.   HENT:      Head: Normocephalic and atraumatic.      Right Ear: Tympanic membrane and external ear normal.      Left Ear: Tympanic membrane and external ear normal.      Nose: Nose normal.      Mouth/Throat:      Pharynx: Oropharynx is clear. No posterior oropharyngeal erythema.   Eyes:      Pupils: Pupils are equal, round, and reactive to light.   Neck:      Thyroid: No thyromegaly.      Vascular: No carotid bruit.   Cardiovascular:      Rate and Rhythm: Normal rate and regular rhythm.      Heart sounds: Normal heart sounds. No murmur heard.  Pulmonary:      Effort: " Pulmonary effort is normal.      Breath sounds: Normal breath sounds. No wheezing or rales.   Abdominal:      General: Bowel sounds are normal. There is no distension.      Palpations: Abdomen is soft.      Tenderness: There is no abdominal tenderness.   Musculoskeletal:         General: No tenderness or deformity. Normal range of motion.      Cervical back: Normal range of motion and neck supple.      Lumbar back: Normal. No spasms.      Comments: Bends 90 degrees at  waist, shoulders and knees have full range of motion, no pitting edema to lower extremities   Lymphadenopathy:      Cervical: No cervical adenopathy.   Skin:     General: Skin is warm and dry.      Findings: No rash.   Neurological:      General: No focal deficit present.      Mental Status: He is alert and oriented to person, place, and time. Mental status is at baseline.      Cranial Nerves: No cranial nerve deficit.      Coordination: Coordination normal.      Gait: Gait abnormal (Has a slow shuffling gait with poor balance).   Psychiatric:         Mood and Affect: Mood normal.         Behavior: Behavior normal.         Thought Content: Thought content normal.         Judgment: Judgment normal.           Assessment:       1. Irritable bowel syndrome with constipation    2. Localized edema    3. Lumbar radiculopathy    4. Degeneration of intervertebral disc of lumbar region with discogenic back pain    5. Hypercholesteremia    6. Essential hypertension    7. Coronary artery disease    8. Stage 3 chronic kidney disease, unspecified whether stage 3a or 3b CKD    9. Benign prostatic hyperplasia without lower urinary tract symptoms    10. History of prostate cancer    11. Acquired hypothyroidism    12. Gastroesophageal reflux disease without esophagitis    13. Imbalance    14. Sleep disorder         Plan:       Irritable bowel syndrome with constipation  Comments:  recommend reducing linzess to 72mcg and take daily, resume nexium. cautioned to call  office for severe pain, n/v or fever   Orders:  -     linaCLOtide (LINZESS) 72 mcg Cap capsule; Take 1 capsule (72 mcg total) by mouth before breakfast.  Dispense: 30 capsule; Refill: 5    Localized edema  -     potassium chloride SA (K-DUR,KLOR-CON M) 10 MEQ tablet; Take 1 tablet (10 mEq total) by mouth daily as needed (take one tablet any time you take furosemide).  Dispense: 90 tablet; Refill: 1  Refill potassium  Lumbar radiculopathy  Using Tylenol p.r.n.  Degeneration of intervertebral disc of lumbar region with discogenic back pain    Hypercholesteremia  needs lab work drawn soon  Essential hypertension  Blood pressure well controlled  Coronary artery disease  No chest pain recent  Stage 3 chronic kidney disease, unspecified whether stage 3a or 3b CKD  Lab work tomorrow  Benign prostatic hyperplasia without lower urinary tract symptoms    History of prostate cancer    Acquired hypothyroidism    Gastroesophageal reflux disease without esophagitis  For dysphagia, recommend crushing tablets and opening capsules and putting in pudding or applesauce.  Imbalance  Rollator to be ordered  Sleep disorder      Follow up in about 6 months (around 5/6/2025), or OA.        11/8/2024 Lauri Santos

## 2024-11-11 ENCOUNTER — TELEPHONE (OUTPATIENT)
Dept: FAMILY MEDICINE | Facility: CLINIC | Age: 89
End: 2024-11-11
Payer: MEDICARE

## 2024-11-11 NOTE — TELEPHONE ENCOUNTER
----- Message from Lauri Santos MD sent at 11/9/2024 11:30 AM CST -----  Call patient's daughter.  His lab work looks excellent with normal sugar kidneys and liver.  Cholesterol excellent at 128.  Continue current medications

## 2024-11-13 ENCOUNTER — TELEPHONE (OUTPATIENT)
Dept: FAMILY MEDICINE | Facility: CLINIC | Age: 89
End: 2024-11-13
Payer: MEDICARE

## 2024-11-13 NOTE — TELEPHONE ENCOUNTER
----- Message from Antonia sent at 11/13/2024  2:26 PM CST -----  Pt had blood work done last week and would like the results   [phone]

## 2024-11-25 DIAGNOSIS — K58.1 IRRITABLE BOWEL SYNDROME WITH CONSTIPATION: ICD-10-CM

## 2024-11-25 NOTE — TELEPHONE ENCOUNTER
----- Message from Eva sent at 11/25/2024 10:46 AM CST -----  [facility]   [phone]

## 2025-03-06 ENCOUNTER — TELEPHONE (OUTPATIENT)
Dept: FAMILY MEDICINE | Facility: CLINIC | Age: OVER 89
End: 2025-03-06
Payer: MEDICARE

## 2025-03-06 NOTE — TELEPHONE ENCOUNTER
----- Message from Swetha sent at 3/6/2025  4:48 PM CST -----  Regarding: sgy form to fill out  Pts dtr dropped off eye sgy form to fill out  in folder at   Fax form and call daughter, Gregoria [phone] to  a hard copy as well thanks kbb

## 2025-03-13 ENCOUNTER — TELEPHONE (OUTPATIENT)
Dept: FAMILY MEDICINE | Facility: CLINIC | Age: OVER 89
End: 2025-03-13
Payer: MEDICARE

## 2025-03-13 NOTE — TELEPHONE ENCOUNTER
----- Message from Julita sent at 3/13/2025  9:07 AM CDT -----  Renee called from Dr. Erin Tamez's office. Renee received the michell for this patient. There was a sticky note on the rt side bottom corner of the michell. It said [facility]. Renee is going to fax the old michell and a new one to be filled out.  Renee's # [phone] fax # [phone]

## 2025-03-27 ENCOUNTER — HOSPITAL ENCOUNTER (EMERGENCY)
Facility: HOSPITAL | Age: OVER 89
Discharge: HOME | End: 2025-03-28
Attending: STUDENT IN AN ORGANIZED HEALTH CARE EDUCATION/TRAINING PROGRAM
Payer: MEDICARE

## 2025-03-27 DIAGNOSIS — I10 HTN (HYPERTENSION): Primary | ICD-10-CM

## 2025-03-27 PROCEDURE — 93005 ELECTROCARDIOGRAM TRACING: CPT | Performed by: GENERAL PRACTICE

## 2025-03-27 PROCEDURE — 99283 EMERGENCY DEPT VISIT LOW MDM: CPT | Mod: 25

## 2025-03-27 PROCEDURE — 93010 ELECTROCARDIOGRAM REPORT: CPT | Mod: ,,, | Performed by: GENERAL PRACTICE

## 2025-03-28 VITALS
HEART RATE: 73 BPM | DIASTOLIC BLOOD PRESSURE: 73 MMHG | HEIGHT: 64 IN | BODY MASS INDEX: 23.9 KG/M2 | WEIGHT: 140 LBS | RESPIRATION RATE: 18 BRPM | SYSTOLIC BLOOD PRESSURE: 168 MMHG | TEMPERATURE: 98 F | OXYGEN SATURATION: 95 %

## 2025-03-28 DIAGNOSIS — I10 ESSENTIAL HYPERTENSION: ICD-10-CM

## 2025-03-28 RX ORDER — LISINOPRIL 20 MG/1
20 TABLET ORAL DAILY
Qty: 90 TABLET | Refills: 1 | Status: SHIPPED | OUTPATIENT
Start: 2025-03-28 | End: 2025-09-24

## 2025-03-28 NOTE — TELEPHONE ENCOUNTER
Ok to refill. Needs NV in one week to check in office. I would say lets max this out at 20mg though first

## 2025-03-28 NOTE — DISCHARGE INSTRUCTIONS
You can take Tylenol as needed for any pain that you may be having.  Please follow-up with your primary care doctor.  
shortness of breath

## 2025-03-28 NOTE — ED PROVIDER NOTES
"Encounter Date: 3/27/2025       History     Chief Complaint   Patient presents with    Hypertension     Patient states that he began to feel flushed while working in the yard today. He states that he checked his BP and 170/102 at home. He also states that he took his home BP medication x 1 hour ago. Patient denies headache, blurred vision, or chest pain     HPI  Melvin Powers is a 89-year-old male who presents with an episode of elevated blood pressure and feeling of being flushed and hot in the face.  He states that he typically has well-controlled blood pressure but that he is prescribed lisinopril to take whenever he does have high blood pressure.  During this episode and currently he has no shortness of breath, chest pain, dizziness, changes in vision, abdominal pain, left arm pain, weakness, or any other complaints.  He states that his blood pressure initially was 200/100 and then again 170/102 before he came here.    Review of patient's allergies indicates:   Allergen Reactions    Atorvastatin Other (See Comments)     Other reaction(s): Muscle pain    Codeine      Other reaction(s): makes him"goofey"    Contrast media     Iodinated contrast media      Other reaction(s): Rash     Past Medical History:   Diagnosis Date    Cataract     CKD (chronic kidney disease) stage 3, GFR 30-59 ml/min 3/16/2014    Coronary artery disease     GERD (gastroesophageal reflux disease)     Irritable bowel syndrome with constipation 11/30/2016     Past Surgical History:   Procedure Laterality Date    APPENDECTOMY      COLONOSCOPY      CYSTOSCOPY      pterygium removal Left     UPPER GASTROINTESTINAL ENDOSCOPY       Family History   Problem Relation Name Age of Onset    Rheum arthritis Mother      Cancer Father      Rheum arthritis Father      Cancer Sister          bone    Cancer Brother       Social History[1]  Review of Systems   All other systems reviewed and are negative.      Physical Exam     Initial Vitals   BP Pulse Resp " Temp SpO2   03/27/25 2123 03/27/25 2123 03/27/25 2122 03/27/25 2123 03/27/25 2123   (!) 183/88 73 18 98.3 °F (36.8 °C) 95 %      MAP       --                Physical Exam    Nursing note and vitals reviewed.  Constitutional: He appears well-developed and well-nourished.   HENT:   Head: Normocephalic and atraumatic.   Nose: Nose normal. Mouth/Throat: Oropharynx is clear and moist.   Eyes: Conjunctivae are normal. Pupils are equal, round, and reactive to light.   Neck:   Normal range of motion.  Cardiovascular:  Normal rate, regular rhythm and normal heart sounds.           Pulmonary/Chest: Breath sounds normal. No respiratory distress.   Abdominal: Abdomen is soft. He exhibits no distension. There is no abdominal tenderness.   Musculoskeletal:         General: No edema. Normal range of motion.      Cervical back: Normal range of motion.     Neurological: He is alert.   Skin: Skin is warm and dry.   Psychiatric: He has a normal mood and affect.         ED Course   Procedures  Labs Reviewed - No data to display       Imaging Results    None          Medications - No data to display  Medical Decision Making  My differential includes but is not limited to benign hypertensive episode with low suspicion for hypertensive emergency given no significant signs of end-organ damage or failure.  I have a low suspicion for ACS at this time given no chest pain/shortness of breath/nausea vomiting/left arm pain.    Risk  OTC drugs.  Prescription drug management.  Diagnosis or treatment significantly limited by social determinants of health.  Risk Details: Has not had any over-the-counter medication prior to arrival.                                      Clinical Impression:  Final diagnoses:  [I10] HTN (hypertension)                   [1]   Social History  Tobacco Use    Smoking status: Never    Smokeless tobacco: Never   Substance Use Topics    Alcohol use: Yes     Alcohol/week: 2.0 standard drinks of alcohol     Types: 1 Glasses of  wine, 1 Cans of beer per week    Drug use: No        Niall Ramirez MD  Resident  03/28/25 0108

## 2025-03-28 NOTE — FIRST PROVIDER EVALUATION
" Emergency Department TeleTriage Encounter Note      CHIEF COMPLAINT    Chief Complaint   Patient presents with    Hypertension     Patient states that he began to feel flushed while working in the yard today. He states that he checked his BP and 170/102 at home. He also states that he took his home BP medication x 1 hour ago. Patient denies headache, blurred vision, or chest pain       VITAL SIGNS   Initial Vitals   BP Pulse Resp Temp SpO2   03/27/25 2123 03/27/25 2123 03/27/25 2122 03/27/25 2123 03/27/25 2123   (!) 183/88 73 18 98.3 °F (36.8 °C) 95 %      MAP       --                   ALLERGIES    Review of patient's allergies indicates:   Allergen Reactions    Atorvastatin Other (See Comments)     Other reaction(s): Muscle pain    Codeine      Other reaction(s): makes him"goofey"    Contrast media     Iodinated contrast media      Other reaction(s): Rash       PROVIDER TRIAGE NOTE  Verbal consent for the teletriage evaluation was given by the patient at the start of the evaluation.  All efforts will be made to maintain patient's privacy during the evaluation.      This is a teletriage evaluation of a 89 y.o. male presenting to the ED with c/o elevated BP; PMH HTN but takes the meds only as needed.  States felt "overheated", measured BP and it was 170s/100s.  Denies CP, SOB, Dizziness. Limited physical exam via telehealth: The patient is awake, alert, answering questions appropriately and is not in respiratory distress.  As the Teletriage provider, I performed an initial assessment and ordered appropriate labs and imaging studies, if any, to facilitate the patient's care once placed in the ED. Once a room is available, care and a full evaluation will be completed by an alternate ED provider.  Any additional orders and the final disposition will be determined by that provider.  All imaging and labs will not be followed-up by the Teletriage Team, including myself.          ORDERS  Labs Reviewed   CBC W/ AUTO " DIFFERENTIAL    Narrative:     The following orders were created for panel order CBC auto differential.  Procedure                               Abnormality         Status                     ---------                               -----------         ------                     CBC with Differential[[id number]]                                                        Please view results for these tests on the individual orders.   COMPREHENSIVE METABOLIC PANEL   URINALYSIS, REFLEX TO URINE CULTURE   CBC WITH DIFFERENTIAL       ED Orders (720h ago, onward)      Start Ordered     Status Ordering Provider    03/27/25 2133 03/27/25 2132  Urinalysis, Reflex to Urine Culture  STAT         Ordered RODRIGO FABIAN EARLINE    03/27/25 2132 03/27/25 2132  Saline lock IV  Once         Ordered RODRIGO FABIAN EARLINE    03/27/25 2132 03/27/25 2132  Cardiac Monitoring - Adult  Continuous        Comments: Notify Physician If:    Ordered FABINA WALLACE    03/27/25 2132 03/27/25 2132  Pulse Oximetry Continuous  Continuous         Ordered RODRIGO FABIAN EARLINE    03/27/25 2132 03/27/25 2132  EKG 12-lead  Once         Ordered FABIAN WALLACE    03/27/25 2132 03/27/25 2132  CBC auto differential  STAT         Ordered FABIAN WALLACE    03/27/25 2132 03/27/25 2132  Comprehensive metabolic panel  STAT         Ordered RODRIGO FABIAN PATTEN    03/27/25 2132 03/27/25 2132  CBC with Differential  PROCEDURE ONCE         Ordered FABIAN WALLACE              Virtual Visit Note: The provider triage portion of this emergency department evaluation and documentation was performed via Telly, a HIPAA-compliant telemedicine application, in concert with a tele-presenter in the room. A face to face patient evaluation with one of my colleagues will occur once the patient is placed in an emergency department room.      DISCLAIMER: This note was prepared with edelight voice recognition transcription software. Garbled syntax, mangled pronouns, and other bizarre constructions may be attributed to  that software system.

## 2025-03-28 NOTE — TELEPHONE ENCOUNTER
----- Message from Eva sent at 3/28/2025  9:59 AM CDT -----  Contact: Gregoria  Daughter Gregoria calling about patient going to [facility] due to high bp. Patient is taking lisinopril. BP was 189/89. Asking if a new bottle of lisinopril could be prescribed or something different?[phone]

## 2025-03-28 NOTE — TELEPHONE ENCOUNTER
LMOR for patient to call back - the number listed below is the number to [facility]. Does not look like we have filled Lisinopril since 4/2024 with only 1 refill.     Okay to fill? Looks like they did not change anything in the hospital

## 2025-03-30 LAB
OHS QRS DURATION: 96 MS
OHS QTC CALCULATION: 448 MS

## 2025-03-31 ENCOUNTER — TELEPHONE (OUTPATIENT)
Dept: FAMILY MEDICINE | Facility: CLINIC | Age: OVER 89
End: 2025-03-31
Payer: MEDICARE

## 2025-03-31 NOTE — TELEPHONE ENCOUNTER
----- Message from Med Assistant Krystle sent at 3/28/2025 10:56 AM CDT -----  F/u with patient about BP - lisinopril sent in but also needs NV

## 2025-04-03 ENCOUNTER — OFFICE VISIT (OUTPATIENT)
Dept: FAMILY MEDICINE | Facility: CLINIC | Age: OVER 89
End: 2025-04-03
Payer: MEDICARE

## 2025-04-03 VITALS
SYSTOLIC BLOOD PRESSURE: 112 MMHG | OXYGEN SATURATION: 99 % | HEIGHT: 64 IN | HEART RATE: 75 BPM | DIASTOLIC BLOOD PRESSURE: 56 MMHG | BODY MASS INDEX: 24.65 KG/M2 | WEIGHT: 144.38 LBS

## 2025-04-03 DIAGNOSIS — E03.9 ACQUIRED HYPOTHYROIDISM: ICD-10-CM

## 2025-04-03 DIAGNOSIS — R42 VERTIGO: ICD-10-CM

## 2025-04-03 DIAGNOSIS — K52.0 RADIATION COLITIS: Chronic | ICD-10-CM

## 2025-04-03 DIAGNOSIS — I10 ESSENTIAL HYPERTENSION: ICD-10-CM

## 2025-04-03 DIAGNOSIS — H25.013 CORTICAL AGE-RELATED CATARACT OF BOTH EYES: Primary | ICD-10-CM

## 2025-04-03 DIAGNOSIS — I25.10 ATHEROSCLEROSIS OF NATIVE CORONARY ARTERY OF NATIVE HEART WITHOUT ANGINA PECTORIS: ICD-10-CM

## 2025-04-03 DIAGNOSIS — G47.9 SLEEP DISORDER: Chronic | ICD-10-CM

## 2025-04-03 DIAGNOSIS — N18.30 STAGE 3 CHRONIC KIDNEY DISEASE, UNSPECIFIED WHETHER STAGE 3A OR 3B CKD: Chronic | ICD-10-CM

## 2025-04-03 DIAGNOSIS — K58.1 IRRITABLE BOWEL SYNDROME WITH CONSTIPATION: Chronic | ICD-10-CM

## 2025-04-03 DIAGNOSIS — K21.9 GASTROESOPHAGEAL REFLUX DISEASE WITHOUT ESOPHAGITIS: Chronic | ICD-10-CM

## 2025-04-03 DIAGNOSIS — I25.10 CORONARY ARTERY DISEASE DUE TO CALCIFIED CORONARY LESION: ICD-10-CM

## 2025-04-03 DIAGNOSIS — I25.84 CORONARY ARTERY DISEASE DUE TO CALCIFIED CORONARY LESION: ICD-10-CM

## 2025-04-03 DIAGNOSIS — N40.0 BENIGN PROSTATIC HYPERPLASIA WITHOUT LOWER URINARY TRACT SYMPTOMS: ICD-10-CM

## 2025-04-03 DIAGNOSIS — M54.16 LUMBAR RADICULOPATHY: ICD-10-CM

## 2025-04-03 PROCEDURE — 1159F MED LIST DOCD IN RCRD: CPT | Mod: CPTII,S$GLB,, | Performed by: FAMILY MEDICINE

## 2025-04-03 PROCEDURE — 99214 OFFICE O/P EST MOD 30 MIN: CPT | Mod: S$GLB,,, | Performed by: FAMILY MEDICINE

## 2025-04-03 PROCEDURE — 1101F PT FALLS ASSESS-DOCD LE1/YR: CPT | Mod: CPTII,S$GLB,, | Performed by: FAMILY MEDICINE

## 2025-04-03 PROCEDURE — 3288F FALL RISK ASSESSMENT DOCD: CPT | Mod: CPTII,S$GLB,, | Performed by: FAMILY MEDICINE

## 2025-04-06 NOTE — PROGRESS NOTES
SUBJECTIVE:    Patient ID: Melvin Powers is a 89 y.o. male.    Chief Complaint: Follow-up (ER follow up for HTN on 03/28/2025 / this morning had vertigo/ this morning 8:00 am BP reading was 121/60, prior to appt BP was 108/59 3:20 pm / brought log / had upper back pain this morning but has now resolved/ feels weak today/ did not take BP medication this morning due to low BP//mp)    Follow-up  Associated symptoms include vertigo. Pertinent negatives include no abdominal pain, chest pain, chills, coughing, fatigue, fever, joint swelling, myalgias, nausea, numbness or vomiting.       History of Present Illness    CHIEF COMPLAINT:  Melvin presents today with vertigo symptoms experienced this morning.    VERTIGO AND BALANCE:  He experienced an episode of vertigo this morning with associated weakness and dizziness. Symptoms improved with oral hydration. These episodes are infrequent, with a prolonged period without symptoms until recent recurrence. He uses cane and rollator for ambulation and reports poor balance but denies any recent falls since a remote incident involving falling from bathtub.    BLOOD PRESSURE:  He recently visited the ER for elevated blood pressure accompanied by headache but denies chest pain or shortness of breath. He reports this was unusual as he typically has low blood pressure, stating he has never had pressure above 140 before. He was discharged from the ER around 1 AM.    OCULAR:  He has astigmatism, cataracts, and glaucoma managed with nightly eye drops. He is scheduled for left eye surgery next month with plans for right eye surgery one month afterward, pending successful outcome of first procedure. He is unable to drive at night.    GI:  He takes Linzess with reported onset of action within 1-2 hours of administration. Medication results in 2-3 bowel movements daily without causing diarrhea. He experiences stomach growling and gas with initial bowel movements.    SLEEP:  He reports  difficulty falling asleep and taking a long time to fall asleep. He has 10mg melatonin available but has not tried it yet.    DIET:  He cooks his own meals, avoids TV dinners, and is attempting to reduce salt intake. He denies consumption of chips.      ROS:  Constitutional: -appetite change, -chills, -fatigue, -fever, -unexpected weight change, +weakness  HENT: -ear pain, -trouble swallowing  Eyes: -pain, -discharge, -visual disturbance  Respiratory: -apnea, -cough, -shortness of breath, -wheezing  Cardiovascular: -chest pain, -leg swelling  Gastrointestinal: -abdominal pain, -blood in stool, -constipation, -diarrhea, -nausea, -vomiting, -reflux, +change in bowel habits  Endocrine: -cold intolerance, -heat intolerance, -polydipsia  Genitourinary: -bladder incontinence, -dysuria, -erectile dysfunction, -frequency, -hematuria, -testicular pain, -urgency, -nocturia  Musculoskeletal: -gait problem, -joint swelling, -myalgia  Neurological: +dizziness, -seizures, -numbness, +difficulty falling asleep, +headaches, +balance issues  Psychiatric/Behavioral: -agitation, -hallucinations, -nervous, -anxiety symptoms         Admission on 03/27/2025, Discharged on 03/28/2025   Component Date Value Ref Range Status    QRS Duration 03/27/2025 96  ms Final    [facility] QTC Calculation 03/27/2025 448  ms Final   Telephone on 11/04/2024   Component Date Value Ref Range Status    Glucose 11/07/2024 103 (H)  65 - 99 mg/dL Final    BUN 11/07/2024 24  7 - 25 mg/dL Final    Creatinine 11/07/2024 1.17  0.70 - 1.22 mg/dL Final    eGFR 11/07/2024 58 (L)  > OR = 60 mL/min/1.73m2 Final    BUN/Creatinine Ratio 11/07/2024 SEE NOTE:  6 - 22 (calc) Final    Sodium 11/07/2024 140  135 - 146 mmol/L Final    Potassium 11/07/2024 4.1  3.5 - 5.3 mmol/L Final    Chloride 11/07/2024 103  98 - 110 mmol/L Final    CO2 11/07/2024 31  20 - 32 mmol/L Final    Calcium 11/07/2024 9.4  8.6 - 10.3 mg/dL Final    Total Protein 11/07/2024 6.5  6.1 - 8.1 g/dL Final     "Albumin 11/07/2024 4.1  3.6 - 5.1 g/dL Final    Globulin, Total 11/07/2024 2.4  1.9 - 3.7 g/dL (calc) Final    Albumin/Globulin Ratio 11/07/2024 1.7  1.0 - 2.5 (calc) Final    Total Bilirubin 11/07/2024 0.8  0.2 - 1.2 mg/dL Final    Alkaline Phosphatase 11/07/2024 86  35 - 144 U/L Final    AST 11/07/2024 25  10 - 35 U/L Final    ALT 11/07/2024 25  9 - 46 U/L Final    Cholesterol 11/07/2024 128  <200 mg/dL Final    HDL 11/07/2024 51  > OR = 40 mg/dL Final    Triglycerides 11/07/2024 86  <150 mg/dL Final    LDL Cholesterol 11/07/2024 60  mg/dL (calc) Final    HDL/Cholesterol Ratio 11/07/2024 2.5  <5.0 (calc) Final    Non HDL Chol. (LDL+VLDL) 11/07/2024 77  <130 mg/dL (calc) Final       Past Medical History:   Diagnosis Date    Cataract     CKD (chronic kidney disease) stage 3, GFR 30-59 ml/min 3/16/2014    Coronary artery disease     GERD (gastroesophageal reflux disease)     Irritable bowel syndrome with constipation 11/30/2016     Social History[1]  Past Surgical History:   Procedure Laterality Date    APPENDECTOMY      COLONOSCOPY      CYSTOSCOPY      pterygium removal Left     UPPER GASTROINTESTINAL ENDOSCOPY       Family History   Problem Relation Name Age of Onset    Rheum arthritis Mother      Cancer Father      Rheum arthritis Father      Cancer Sister          bone    Cancer Brother         The CVD Risk score (D'Agostino, et al., 2008) failed to calculate for the following reasons:    The 2008 CVD risk score is only valid for ages 30 to 74    The patient has a prior MI, stroke, CHF, or peripheral vascular disease diagnosis    All of your core healthy metrics are met.      Review of patient's allergies indicates:   Allergen Reactions    Atorvastatin Other (See Comments)     Other reaction(s): Muscle pain    Codeine      Other reaction(s): makes him"goofey"    Contrast media     Iodinated contrast media      Other reaction(s): Rash     Current Medications[2]    Review of Systems   Constitutional:  Negative for " "appetite change, chills, fatigue, fever and unexpected weight change.   HENT:  Negative for ear pain and trouble swallowing.    Eyes:  Positive for visual disturbance. Negative for pain and discharge.   Respiratory:  Negative for apnea, cough, shortness of breath and wheezing.    Cardiovascular:  Negative for chest pain and leg swelling.   Gastrointestinal:  Negative for abdominal pain, blood in stool, constipation, diarrhea, nausea, vomiting and reflux.   Endocrine: Negative for cold intolerance, heat intolerance and polydipsia.   Genitourinary:  Negative for bladder incontinence, dysuria, erectile dysfunction, frequency, hematuria, testicular pain and urgency.   Musculoskeletal:  Negative for gait problem, joint swelling and myalgias.   Neurological:  Positive for dizziness and vertigo. Negative for seizures and numbness.   Psychiatric/Behavioral:  Positive for sleep disturbance. Negative for agitation, behavioral problems and hallucinations. The patient is not nervous/anxious.            Objective:      Vitals:    04/03/25 1530 04/03/25 1544   BP: (!) 112/58 (!) 112/56   Pulse: 75    SpO2: 99%    Weight: 65.5 kg (144 lb 6.4 oz)    Height: 5' 4" (1.626 m)      Physical Exam  Vitals and nursing note reviewed.   Constitutional:       General: He is not in acute distress.     Appearance: Normal appearance. He is well-developed. He is not toxic-appearing.   HENT:      Head: Normocephalic and atraumatic.      Right Ear: Tympanic membrane and external ear normal.      Left Ear: Tympanic membrane and external ear normal.      Nose: Nose normal.      Mouth/Throat:      Pharynx: Oropharynx is clear. No posterior oropharyngeal erythema.   Eyes:      Pupils: Pupils are equal, round, and reactive to light.   Neck:      Thyroid: No thyromegaly.      Vascular: No carotid bruit.   Cardiovascular:      Rate and Rhythm: Normal rate and regular rhythm.      Heart sounds: Normal heart sounds. No murmur heard.  Pulmonary:      " Effort: Pulmonary effort is normal.      Breath sounds: Normal breath sounds. No wheezing or rales.   Abdominal:      General: Bowel sounds are normal. There is no distension.      Palpations: Abdomen is soft.      Tenderness: There is no abdominal tenderness.   Musculoskeletal:         General: No tenderness or deformity. Normal range of motion.      Cervical back: Normal range of motion and neck supple.      Lumbar back: Normal. No spasms.      Comments: Bends 90 degrees at  waist, shoulders and knees have full range of motion, no pitting edema to lower extremities   Lymphadenopathy:      Cervical: No cervical adenopathy.   Skin:     General: Skin is warm and dry.      Findings: No rash.   Neurological:      General: No focal deficit present.      Mental Status: He is alert and oriented to person, place, and time. Mental status is at baseline.      Cranial Nerves: No cranial nerve deficit.      Coordination: Coordination normal.      Gait: Gait abnormal (slow gait with a cane).   Psychiatric:         Mood and Affect: Mood normal.         Behavior: Behavior normal.         Thought Content: Thought content normal.         Judgment: Judgment normal.       Physical Exam    Neck: No carotid bruit.  Cardiovascular: Normal rate and regular rhythm. Normal heart sounds. No murmur heard.             Assessment:       1. Cortical age-related cataract of both eyes    2. Essential hypertension    3. Lumbar radiculopathy    4. Coronary artery disease    5. Atherosclerosis of native coronary artery of native heart without angina pectoris    6. Benign prostatic hyperplasia without lower urinary tract symptoms    7. Stage 3 chronic kidney disease, unspecified whether stage 3a or 3b CKD    8. Acquired hypothyroidism    9. Gastroesophageal reflux disease without esophagitis    10. 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 Vertigo    13. Sleep disorder         Plan:       Cortical age-related cataract  of both eyes  He is medically cleared for cataract surgery.  Hold Plavix 3 days prior to surgery  Essential hypertension  -     TSH w/reflex to FT4; Future; Expected date: 04/03/2025  -     Lipid Panel; Future; Expected date: 04/03/2025  -     Comprehensive Metabolic Panel; Future; Expected date: 04/03/2025  -     CBC Auto Differential; Future; Expected date: 04/03/2025  Blood pressure well controlled  Lumbar radiculopathy  Continue using Rollator or cane, not a surgical candidate for back surgery due to his age  Coronary artery disease  No recent angina  Atherosclerosis of native coronary artery of native heart without angina pectoris  Cholesterol 128, HDL 51 TG 86 LDL 60  Benign prostatic hyperplasia without lower urinary tract symptoms    Stage 3 chronic kidney disease, unspecified whether stage 3a or 3b CKD  GFR is 58 and stable  Acquired hypothyroidism    Gastroesophageal reflux disease without esophagitis    Irritable bowel syndrome with constipation    Radiation colitis    Vertigo  Meclizine 25 mg p.r.n.  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  - Considered recent hypertension episode as an anomaly, possibly related to yard work or fertilizer exposure.  - Vertigo symptoms attributed to possible allergies or pollen exposure; temporary vertigo could be caused by these factors.  - Good heart function and no signs of heart attack based on recent EKG.    DIZZINESS AND VERTIGO:  - Advised the patient to maintain hydration, especially when experiencing vertigo  symptoms.  - Recommend using vertigo medication when experiencing spinning sensations.  - Melvin experienced severe dizziness in the morning, feeling weak and dizzy.  - Noted that the patient denies vertigo often, but had a recent episode.  - Acknowledged the patient's vertigo episode and its potential relation to allergies and yard work.  - Considered the possibility of fertilizer exposure causing the vertigo symptoms.  - Confirmed that the patient has vertigo pills for treatment when experiencing spinning sensations.  - Oaklyn managed symptoms by drinking plenty of water.    UNSTEADINESS AND BALANCE ISSUES:  - Noted that the patient reports not having good balance but has not fallen since a previous bathtub incident.  - Confirmed that the patient uses assistive devices for mobility, including a cane and a rollator.  - Discussed the possibility of therapy for balance issues, acknowledging the patient's age as a factor.  - Oaklyn to continue to stay active and exercise, but be cautious not to overexert.    EYE CONDITIONS (ASTIGMATISM, CATARACTS, GLAUCOMA):  - Noted that the patient reports having astigmatism along with other eye conditions.  - Scheduled the patient for eye surgery, with the right eye to be treated after the left eye.  - Scheduled the patient for eye surgery, with the left eye to be treated first.  - Noted that the patient reports having cataracts along with other eye conditions.  - Scheduled the patient for eye surgery, likely for cataract treatment, with the left eye to be treated first, followed by the right eye 1 month later.  - Noted that the patient reports having glaucoma along with other eye conditions.  - Confirmed that the patient takes eye drops nightly for glaucoma treatment.    INSOMNIA:  - Prescribed melatonin 10 mg at bedtime to address sleep issues.  - Noted that the patient reports not sleeping well and having trouble falling asleep.  - Recommend trying 10 mg melatonin nightly for  a few nights to help with sleep.    CONSTIPATION:  - Noted that the patient reports taking Linzess for bowel movements, which causes frequent bathroom visits and stomach growling.  - Confirmed that Linzess does not cause diarrhea but leads to multiple bowel movements.  - Advised the patient to continue taking Linzess for bowel management and to drink plenty of water.    DEPENDENCE ON ASSISTIVE DEVICES:  - Confirmed that the patient uses assistive devices including a cane and rollator for mobility support.    FALL HISTORY:  - Noted that the patient mentions a previous fall in the bathtub.    [facility] HEALTH MANAGEMENT:  - Discussed the ubiquity of salt in foods and the challenge of completely eliminating it from diet.  - Melvin to fast before labs appointment, but water is allowed.  - Ordered comprehensive labs including thyroid, liver, and renal function tests to assess overall health.    FOLLOW-UP:  - Follow up in 1-2 weeks for labs.         Follow up in about 6 months (around 10/3/2025), or htn.        This note was generated with the assistance of ambient listening technology. Verbal consent was obtained by the patient and accompanying visitor(s) for the recording of patient appointment to facilitate this note. I attest to having reviewed and edited the generated note for accuracy, though some syntax or spelling errors may persist. Please contact the author of this note for any clarification.      4/5/2025 Lauri Santos           [1]   Social History  Socioeconomic History    Marital status:    Tobacco Use    Smoking status: Never    Smokeless tobacco: Never   Substance and Sexual Activity    Alcohol use: Yes     Alcohol/week: 2.0 standard drinks of alcohol     Types: 1 Glasses of wine, 1 Cans of beer per week    Drug use: No   [2]   Current Outpatient Medications:     cholecalciferol, vitamin D3, (VITAMIN D3) 25 mcg (1,000 unit) capsule, Take 1,000 Units by mouth once daily., Disp: , Rfl:      clopidogreL (PLAVIX) 75 mg tablet, Take 1 tablet (75 mg total) by mouth once daily., Disp: 90 tablet, Rfl: 3    clotrimazole-betamethasone (LOTRISONE) lotion, Apply topically 2 (two) times daily., Disp: 180 mL, Rfl: 1    coenzyme Q10 100 mg capsule, Take 200 mg by mouth once daily., Disp: , Rfl:     donepeziL (ARICEPT) 10 MG tablet, Take 1 tablet (10 mg total) by mouth every evening., Disp: 90 tablet, Rfl: 3    esomeprazole (NEXIUM) 40 MG capsule, Nexium 40 mg capsule,delayed release  Take 1 capsule every day by oral route as needed., Disp: 90 capsule, Rfl: 3    furosemide (LASIX) 20 MG tablet, Take 1 tablet (20 mg total) by mouth daily as needed (swelling)., Disp: 30 tablet, Rfl: 2    latanoprost 0.005 % ophthalmic solution, Place 1 drop into both eyes every evening., Disp: , Rfl:     levothyroxine (SYNTHROID) 75 MCG tablet, Take 1 tablet (75 mcg total) by mouth once daily., Disp: 90 tablet, Rfl: 3    LIDOcaine (XYLOCAINE) 5 % Oint ointment, Apply topically as needed (to painful rash, 3 to 4 times a day)., Disp: 30 g, Rfl: 0    linaCLOtide (LINZESS) 72 mcg Cap capsule, Take 1 capsule (72 mcg total) by mouth before breakfast., Disp: 30 capsule, Rfl: 5    lisinopriL (PRINIVIL,ZESTRIL) 20 MG tablet, Take 1 tablet (20 mg total) by mouth once daily., Disp: 90 tablet, Rfl: 1    magnesium gluconate 27.5 mg (500 mg) Tab, Take 500 mg by mouth once daily., Disp: , Rfl:     meclizine (ANTIVERT) 25 mg tablet, Take 1 tablet (25 mg total) by mouth 3 (three) times daily as needed., Disp: 60 tablet, Rfl: 0    multivitamin (THERAGRAN) per tablet, Take 1 tablet by mouth once daily., Disp: 100 tablet, Rfl: 3    nitroGLYCERIN 0.1 mg/hr TD PT24 (NITRODUR) 0.1 mg/hr PT24, Place 1 patch onto the skin once daily., Disp: , Rfl: 11    potassium chloride SA (K-DUR,KLOR-CON M) 10 MEQ tablet, Take 1 tablet (10 mEq total) by mouth daily as needed (take one tablet any time you take furosemide)., Disp: 90 tablet, Rfl: 1    rosuvastatin (CRESTOR)  40 MG Tab, Take 1 tablet (40 mg total) by mouth once daily., Disp: 90 tablet, Rfl: 3    sertraline (ZOLOFT) 25 MG tablet, Take 1 tablet (25 mg total) by mouth once daily., Disp: 30 tablet, Rfl: 3    UNABLE TO FIND, Take 1 tablet by mouth once daily. PREVAGEN, Disp: , Rfl:     vitamin E 400 UNIT capsule, Take 400 Units by mouth once daily., Disp: , Rfl:

## 2025-04-08 LAB
ALBUMIN SERPL-MCNC: 4.3 G/DL (ref 3.6–5.1)
ALBUMIN/GLOB SERPL: 1.6 (CALC) (ref 1–2.5)
ALP SERPL-CCNC: 80 U/L (ref 35–144)
ALT SERPL-CCNC: 22 U/L (ref 9–46)
AST SERPL-CCNC: 22 U/L (ref 10–35)
BASOPHILS # BLD AUTO: 32 CELLS/UL (ref 0–200)
BASOPHILS NFR BLD AUTO: 0.6 %
BILIRUB SERPL-MCNC: 0.9 MG/DL (ref 0.2–1.2)
BUN SERPL-MCNC: 33 MG/DL (ref 7–25)
BUN/CREAT SERPL: 22 (CALC) (ref 6–22)
CALCIUM SERPL-MCNC: 10.3 MG/DL (ref 8.6–10.3)
CHLORIDE SERPL-SCNC: 101 MMOL/L (ref 98–110)
CHOLEST SERPL-MCNC: 137 MG/DL
CHOLEST/HDLC SERPL: 3 (CALC)
CO2 SERPL-SCNC: 30 MMOL/L (ref 20–32)
CREAT SERPL-MCNC: 1.5 MG/DL (ref 0.7–1.22)
EGFR: 43 ML/MIN/1.73M2
EOSINOPHIL # BLD AUTO: 351 CELLS/UL (ref 15–500)
EOSINOPHIL NFR BLD AUTO: 6.5 %
ERYTHROCYTE [DISTWIDTH] IN BLOOD BY AUTOMATED COUNT: 13 % (ref 11–15)
GLOBULIN SER CALC-MCNC: 2.7 G/DL (CALC) (ref 1.9–3.7)
GLUCOSE SERPL-MCNC: 99 MG/DL (ref 65–99)
HCT VFR BLD AUTO: 39.7 % (ref 38.5–50)
HDLC SERPL-MCNC: 45 MG/DL
HGB BLD-MCNC: 13.2 G/DL (ref 13.2–17.1)
LDLC SERPL CALC-MCNC: 72 MG/DL (CALC)
LYMPHOCYTES # BLD AUTO: 1696 CELLS/UL (ref 850–3900)
LYMPHOCYTES NFR BLD AUTO: 31.4 %
MCH RBC QN AUTO: 31.1 PG (ref 27–33)
MCHC RBC AUTO-ENTMCNC: 33.2 G/DL (ref 32–36)
MCV RBC AUTO: 93.4 FL (ref 80–100)
MONOCYTES # BLD AUTO: 626 CELLS/UL (ref 200–950)
MONOCYTES NFR BLD AUTO: 11.6 %
NEUTROPHILS # BLD AUTO: 2695 CELLS/UL (ref 1500–7800)
NEUTROPHILS NFR BLD AUTO: 49.9 %
NONHDLC SERPL-MCNC: 92 MG/DL (CALC)
PLATELET # BLD AUTO: 210 THOUSAND/UL (ref 140–400)
PMV BLD REES-ECKER: 9.1 FL (ref 7.5–12.5)
POTASSIUM SERPL-SCNC: 4.8 MMOL/L (ref 3.5–5.3)
PROT SERPL-MCNC: 7 G/DL (ref 6.1–8.1)
RBC # BLD AUTO: 4.25 MILLION/UL (ref 4.2–5.8)
SODIUM SERPL-SCNC: 140 MMOL/L (ref 135–146)
TRIGL SERPL-MCNC: 115 MG/DL
TSH SERPL-ACNC: 4.2 MIU/L (ref 0.4–4.5)
WBC # BLD AUTO: 5.4 THOUSAND/UL (ref 3.8–10.8)

## 2025-04-11 ENCOUNTER — RESULTS FOLLOW-UP (OUTPATIENT)
Dept: FAMILY MEDICINE | Facility: CLINIC | Age: OVER 89
End: 2025-04-11
Payer: MEDICARE

## 2025-04-11 NOTE — PROGRESS NOTES
Call patient.  His blood work looks good.  Sugar liver and thyroid all normal.  Kidneys are slightly weaker than last year and he needs to drink more water during the day.  Cholesterol is excellent at 137.  CBC shows no anemia.  Continue current medications

## 2025-04-14 NOTE — TELEPHONE ENCOUNTER
----- Message from Cyndi sent at 4/14/2025 10:05 AM CDT -----  Pt is returning the office call. Pt #[phone]

## 2025-04-15 NOTE — TELEPHONE ENCOUNTER
----- Message from Eva sent at 4/15/2025 10:50 AM CDT -----  Returning a phone call from yesterday. [phone]

## 2025-04-17 ENCOUNTER — CLINICAL SUPPORT (OUTPATIENT)
Dept: FAMILY MEDICINE | Facility: CLINIC | Age: OVER 89
End: 2025-04-17
Payer: MEDICARE

## 2025-04-17 VITALS — SYSTOLIC BLOOD PRESSURE: 120 MMHG | DIASTOLIC BLOOD PRESSURE: 60 MMHG

## 2025-04-17 DIAGNOSIS — I10 ESSENTIAL HYPERTENSION: Primary | ICD-10-CM

## 2025-04-17 NOTE — PROGRESS NOTES
Brought BP record, vertigo , take Lisinopril 10 mg daily and come back for nurse visit in 2 weeks x Dr Santos,abc

## 2025-04-21 DIAGNOSIS — I25.10 CORONARY ARTERY DISEASE DUE TO CALCIFIED CORONARY LESION: ICD-10-CM

## 2025-04-21 DIAGNOSIS — I25.84 CORONARY ARTERY DISEASE DUE TO CALCIFIED CORONARY LESION: ICD-10-CM

## 2025-04-21 RX ORDER — CLOPIDOGREL BISULFATE 75 MG/1
75 TABLET ORAL DAILY
Qty: 90 TABLET | Refills: 3 | Status: SHIPPED | OUTPATIENT
Start: 2025-04-21

## 2025-04-21 NOTE — TELEPHONE ENCOUNTER
----- Message from Antonia sent at 4/21/2025  3:13 PM CDT -----  Pt needs refill on plavix [phone]

## 2025-04-24 DIAGNOSIS — I70.0 AORTIC ATHEROSCLEROSIS: ICD-10-CM

## 2025-04-24 DIAGNOSIS — I77.811 AORTIC ECTASIA, ABDOMINAL: ICD-10-CM

## 2025-04-24 DIAGNOSIS — I25.10 CORONARY ARTERY DISEASE DUE TO CALCIFIED CORONARY LESION: ICD-10-CM

## 2025-04-24 DIAGNOSIS — I25.84 CORONARY ARTERY DISEASE DUE TO CALCIFIED CORONARY LESION: ICD-10-CM

## 2025-04-24 RX ORDER — ROSUVASTATIN CALCIUM 40 MG/1
40 TABLET, COATED ORAL DAILY
Qty: 90 TABLET | Refills: 3 | Status: SHIPPED | OUTPATIENT
Start: 2025-04-24

## 2025-04-24 NOTE — TELEPHONE ENCOUNTER
----- Message from Cyndi sent at 4/24/2025  2:00 PM CDT -----  Refill for [facility]. [facility] in [place]. Pt #[phone]

## 2025-04-29 RX ORDER — DOXEPIN 6 MG/1
6 TABLET, FILM COATED ORAL NIGHTLY
Qty: 30 TABLET | Refills: 0 | Status: SHIPPED | OUTPATIENT
Start: 2025-04-29

## 2025-04-29 NOTE — TELEPHONE ENCOUNTER
Spoke with patients daughter, states he has Doxepin 6mg at home from 2016 and wants to start taking it again. States he was prescribed this for trouble sleeping a long time ago and now is having trouble with this again. States he only got two hours of sleep last night.    Printed.

## 2025-04-29 NOTE — TELEPHONE ENCOUNTER
----- Message from Cyndi sent at 4/29/2025  2:39 PM CDT -----  Contact: Bridgett Nguyen for doxepin. [facility] in [place]. Bridgett #[phone]

## 2025-05-01 ENCOUNTER — CLINICAL SUPPORT (OUTPATIENT)
Dept: FAMILY MEDICINE | Facility: CLINIC | Age: OVER 89
End: 2025-05-01
Payer: MEDICARE

## 2025-05-01 VITALS — OXYGEN SATURATION: 98 % | SYSTOLIC BLOOD PRESSURE: 149 MMHG | HEART RATE: 79 BPM | DIASTOLIC BLOOD PRESSURE: 68 MMHG

## 2025-05-01 DIAGNOSIS — I10 ESSENTIAL HYPERTENSION: Primary | ICD-10-CM

## 2025-05-01 NOTE — PROGRESS NOTES
Pt is here for a nurse visit, blood pressure check. Pt stated that he is taken Lisinopril 10 mg once daily. Per Dr. Santos, continue with current medication and RTC as scheduled.

## 2025-05-21 RX ORDER — DOXEPIN 6 MG/1
6 TABLET, FILM COATED ORAL NIGHTLY
Qty: 30 TABLET | Refills: 3 | Status: SHIPPED | OUTPATIENT
Start: 2025-05-21

## 2025-05-21 NOTE — TELEPHONE ENCOUNTER
----- Message from Antonia sent at 5/21/2025 11:38 AM CDT -----  Pt daughter aisha is calling for refill on doxepinSams [phone]

## 2025-06-02 ENCOUNTER — HOSPITAL ENCOUNTER (EMERGENCY)
Facility: HOSPITAL | Age: OVER 89
Discharge: HOME | End: 2025-06-02
Attending: STUDENT IN AN ORGANIZED HEALTH CARE EDUCATION/TRAINING PROGRAM
Payer: MEDICARE

## 2025-06-02 VITALS
TEMPERATURE: 98 F | RESPIRATION RATE: 18 BRPM | OXYGEN SATURATION: 98 % | DIASTOLIC BLOOD PRESSURE: 86 MMHG | BODY MASS INDEX: 24.16 KG/M2 | HEIGHT: 65 IN | SYSTOLIC BLOOD PRESSURE: 164 MMHG | HEART RATE: 84 BPM | WEIGHT: 145 LBS

## 2025-06-02 DIAGNOSIS — R31.9 HEMATURIA, UNSPECIFIED TYPE: Primary | ICD-10-CM

## 2025-06-02 LAB
BACTERIA #/AREA URNS AUTO: ABNORMAL /HPF
BILIRUB UR QL STRIP.AUTO: NEGATIVE
CLARITY UR: ABNORMAL
COLOR UR AUTO: YELLOW
GLUCOSE UR QL STRIP: NEGATIVE
HGB UR QL STRIP: ABNORMAL
KETONES UR QL STRIP: NEGATIVE
LEUKOCYTE ESTERASE UR QL STRIP: NEGATIVE
MICROSCOPIC COMMENT: ABNORMAL
NITRITE UR QL STRIP: NEGATIVE
PH UR STRIP: 6 [PH]
PROT UR QL STRIP: ABNORMAL
RBC #/AREA URNS AUTO: 13 /HPF
SP GR UR STRIP: 1.01
SQUAMOUS #/AREA URNS AUTO: <1 /HPF
UROBILINOGEN UR STRIP-ACNC: NEGATIVE EU/DL
WBC #/AREA URNS AUTO: 2 /HPF

## 2025-06-02 PROCEDURE — 81001 URINALYSIS AUTO W/SCOPE: CPT | Performed by: PHYSICIAN ASSISTANT

## 2025-06-02 PROCEDURE — 99283 EMERGENCY DEPT VISIT LOW MDM: CPT

## 2025-06-03 ENCOUNTER — TELEPHONE (OUTPATIENT)
Dept: FAMILY MEDICINE | Facility: CLINIC | Age: OVER 89
End: 2025-06-03
Payer: MEDICARE

## 2025-06-03 PROCEDURE — 87186 SC STD MICRODIL/AGAR DIL: CPT | Performed by: STUDENT IN AN ORGANIZED HEALTH CARE EDUCATION/TRAINING PROGRAM

## 2025-06-03 RX ORDER — CEFDINIR 300 MG/1
300 CAPSULE ORAL 2 TIMES DAILY
Qty: 14 CAPSULE | Refills: 0 | Status: SHIPPED | OUTPATIENT
Start: 2025-06-03 | End: 2025-06-10

## 2025-06-04 ENCOUNTER — RESULTS FOLLOW-UP (OUTPATIENT)
Dept: EMERGENCY MEDICINE | Facility: HOSPITAL | Age: OVER 89
End: 2025-06-04

## 2025-06-05 LAB — BACTERIA UR CULT: ABNORMAL

## 2025-08-07 DIAGNOSIS — E03.9 ACQUIRED HYPOTHYROIDISM: ICD-10-CM

## 2025-08-07 RX ORDER — LEVOTHYROXINE SODIUM 75 UG/1
75 TABLET ORAL DAILY
Qty: 90 TABLET | Refills: 3 | Status: SHIPPED | OUTPATIENT
Start: 2025-08-07

## 2025-08-07 NOTE — TELEPHONE ENCOUNTER
----- Message from Eva sent at 8/7/2025  9:18 AM CDT -----  Contact: Gregoria  Gregoria Daughter calling in regarding to patient needing a refill on synthroid.   [facility] - [place]   [phone]